# Patient Record
Sex: FEMALE | Race: BLACK OR AFRICAN AMERICAN | Employment: OTHER | URBAN - METROPOLITAN AREA
[De-identification: names, ages, dates, MRNs, and addresses within clinical notes are randomized per-mention and may not be internally consistent; named-entity substitution may affect disease eponyms.]

---

## 2017-02-08 ENCOUNTER — HOSPITAL ENCOUNTER (OUTPATIENT)
Dept: PHYSICAL THERAPY | Age: 62
Discharge: HOME | End: 2017-02-08
Payer: COMMERCIAL

## 2017-02-08 PROCEDURE — 97530 THERAPEUTIC ACTIVITIES: CPT

## 2017-02-08 PROCEDURE — 97110 THERAPEUTIC EXERCISES: CPT

## 2017-02-08 PROCEDURE — 97162 PT EVAL MOD COMPLEX 30 MIN: CPT
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
## 2017-02-09 ENCOUNTER — HOSPITAL ENCOUNTER (OUTPATIENT)
Dept: PHYSICAL THERAPY | Age: 62
Discharge: HOME | End: 2017-02-09
Payer: COMMERCIAL

## 2017-02-09 PROCEDURE — 97110 THERAPEUTIC EXERCISES: CPT

## 2017-02-09 PROCEDURE — 97140 MANUAL THERAPY 1/> REGIONS: CPT

## 2017-02-09 NOTE — PROGRESS NOTES
PHYSICAL THERAPY - DAILY TREATMENT NOTE    Patient Name: Kamilla Steele        Date: 2017  : 1955   YES Patient  Verified  Visit #:   2     Insurance: Payor: Beverley Osuna / Plan: Rolan Umana / Product Type: HMO /      In time: 9:05 Out time: 10:05   Total Treatment Time: 60     Medicare Time Tracking (below)   Total Timed Codes (min):  NA 1:1 Treatment Time:  NA     TREATMENT AREA =  Neck pain [M54.2]  Low back pain [M54.5]    SUBJECTIVE    Pain Level (on 0 to 10 scale):  7  / 10   Medication Changes/New allergies or changes in medical history, any new surgeries or procedures? NO     If yes, update Summary List   Subjective Functional Status/Changes:  []  No changes reported     \"I still am really stiff and in pain whenever I move. \"          OBJECTIVE    Modalities Rationale: To decrease pain and muscle guarding.       min [] Estim, type/location:                                      []  att     []  unatt     []  w/US     []  w/ice    []  w/heat    min []  Mechanical Traction: type/lbs                   []  pro   []  sup   []  int   []  cont    []  before manual    []  after manual    min []  Ultrasound, settings/location:      min []  Iontophoresis w/ dexamethasone, location:                                               []  take home patch       []  in clinic   10 min []  Ice     [x]  Heat    location/position: Cervical spine and L chest wall    min []  Vasopneumatic Device, press/temp:     min []  Other:    [x] Skin assessment post-treatment (if applicable):   [x]  intact    []  redness- no adverse reaction     []redness - adverse reaction:     38 min Therapeutic Exercise:  [x]  See flow sheet   Rationale:      increase ROM and increase strength to improve the patients ability to perform ADL's with improved periscapular stability     12 min Manual Therapy: TPR and MFR to the L pec minor/levator scapulae, subscapularis/quadratus lumborum   Rationale:      decrease pain, increase ROM and increase tissue extensibility to improve patient's ability to transfer with decreased sensitiivty. min Patient Education:  YES  Reviewed HEP   []  Progressed/Changed HEP based on:   Patient reports compliance     Other Objective/Functional Measures:    Pt had a lot of pain with any exercise involving ER in either supine or standing. Pt was able to perform other exercises with temporary discomfort but no apparent relief. Post Treatment Pain Level (on 0 to 10) scale:   6  / 10     ASSESSMENT    Assessment/Changes in Function:     Pt presents to PT with remaining muscle guarding and poor activity tolerance due to persistent muscule guarding. The pt would benefit from continued gait training to prevent lateral/lean and compensations that increase LBP in addition to postural training to decrease cervical/pectoral tightness and pain. []  See Progress Note/Recertification   Patient will continue to benefit from skilled PT services to modify and progress therapeutic interventions, address functional mobility deficits, address ROM deficits, address strength deficits, analyze and address soft tissue restrictions, analyze and cue movement patterns, analyze and modify body mechanics/ergonomics and assess and modify postural abnormalities to attain remaining goals. Progress toward goals / Updated goals: · Short Term Goals: To be accomplished in 2-3 weeks:  1. Patient will demonstrate compliance with HEP for symptom management at home. 2. Patient will report at least 50% improvement in symptoms associated while sitting for 30 minutes to increase tolerance for work duties. 3. Patient will require vc's to sit with upright posture only 25% of the time to increase carryover to ADL's. Addressed with rows/extensions and retractions. · Long Term Goals: To be accomplished in 4-6 weeks:  1. Patient will be independent with HEP to self-manage and prevent symptoms upon DC.   2. Patient will improve FOTO score by 8 points to indicate improved functional status. 3. 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 4. Patient will demonstrate at least 4+ hip abduction MMT B to increase symmetrical weight-bearing in gait.      PLAN    [x]  Upgrade activities as tolerated YES Continue plan of care   []  Discharge due to :    []  Other:      Therapist: Ernst Campuzano    Date: 2/9/2017 Time: 11:03 AM     Future Appointments  Date Time Provider Kateryna Melvin   2/15/2017 4:00 PM [facility]   2/21/2017 8:00 AM [facility]   2/22/2017 4:00 PM [address] [place] [address]

## 2017-02-14 ENCOUNTER — HOSPITAL ENCOUNTER (OUTPATIENT)
Dept: PHYSICAL THERAPY | Age: 62
Discharge: HOME | End: 2017-02-14
Payer: COMMERCIAL

## 2017-02-14 PROCEDURE — 97140 MANUAL THERAPY 1/> REGIONS: CPT

## 2017-02-14 PROCEDURE — 97110 THERAPEUTIC EXERCISES: CPT

## 2017-02-14 NOTE — PROGRESS NOTES
PHYSICAL THERAPY - DAILY TREATMENT NOTE    Patient Name: Kamilla Steele        Date: 2017  : 1955   YES Patient  Verified  Visit #:   3     Insurance: Payor: Beverley Osuna / Plan: Rolan Umana / Product Type: HMO /      In time: 8:05 Out time: 8:53   Total Treatment Time: 48     Medicare Time Tracking (below)   Total Timed Codes (min):  NA 1:1 Treatment Time:  NA     TREATMENT AREA =  Neck pain [M54.2]  Low back pain [M54.5]    SUBJECTIVE    Pain Level (on 0 to 10 scale):   10   Medication Changes/New allergies or changes in medical history, any new surgeries or procedures? NO     If yes, update Summary List   Subjective Functional Status/Changes:  []  No changes reported     \"I feel a good bit better and I haven't taken any medication. \"          OBJECTIVE    40 min Therapeutic Exercise:  [x]  See flow sheet   Rationale:      increase ROM and increase strength to improve the patients ability to perform ADL's with improved core stability. 8 min Manual Therapy: TPR of L QL levator scapulae and scalenes   Rationale:      decrease pain, increase ROM and increase tissue extensibility to improve patient's ability to perform [place] ADL's with improved activity tolerance. min Patient Education:  YES  Reviewed HEP   []  Progressed/Changed HEP based on:   Patient reports compliance     Other Objective/Functional Measures:    Initiated SB core stabilization exercise and supine scapular stabilization exercises without pain this visit. Reviewed correct postural mechanics while sitting at a desk for [employer] duties and sitting to pray in order to decrease strain on the cervical musculature. Post Treatment Pain Level (on 0 to 10) scale:   3  / 10     ASSESSMENT    Assessment/Changes in Function:     Pt is slowly decreasing in pain associated with postural stabilization exercises.   Patient would benefit from continued review of cervical/lumbar stretches and a core stabilization program to prevent recurrence of symptoms. []  See Progress Note/Recertification   Patient will continue to benefit from skilled PT services to modify and progress therapeutic interventions, address functional mobility deficits, address strength deficits, analyze and address soft tissue restrictions, analyze and cue movement patterns, analyze and modify body mechanics/ergonomics and assess and modify postural abnormalities to attain remaining goals. Progress toward goals / Updated goals: · Short Term Goals: To be accomplished in 2-3 weeks:  1. Patient will demonstrate compliance with HEP for symptom management at home. 2. Patient will report at least 50% improvement in symptoms associated while sitting for 30 minutes to increase tolerance for work duties. Progressing. Addressed with review of proper postural mechanics with ADL's.  3. Patient will require vc's to sit with upright posture only 25% of the time to increase carryover to ADL's. Addressed with rows/extensions and retractions. · Long Term Goals: To be accomplished in 4-6 weeks:  1. Patient will be independent with HEP to self-manage and prevent symptoms upon DC. 2. Patient will improve FOTO score by 8 points to indicate improved functional status. 3. 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 4. Patient will demonstrate at least 4+ hip abduction MMT B to increase symmetrical weight-bearing in gait.      PLAN    [x]  Upgrade activities as tolerated YES Continue plan of care   []  Discharge due to :    []  Other:      Therapist: Marine Torres    Date: 2/14/2017 Time: 8:48 AM     Future Appointments  Date Time Provider Kateryna Melvin   2/16/2017 8:30 AM [address] [place] [address]   2/21/2017 8:00 AM [address] [place] [address]

## 2017-02-15 ENCOUNTER — APPOINTMENT (OUTPATIENT)
Dept: PHYSICAL THERAPY | Age: 62
End: 2017-02-15
Payer: COMMERCIAL

## 2017-02-16 ENCOUNTER — HOSPITAL ENCOUNTER (OUTPATIENT)
Dept: PHYSICAL THERAPY | Age: 62
Discharge: HOME | End: 2017-02-16
Payer: COMMERCIAL

## 2017-02-16 PROCEDURE — 97110 THERAPEUTIC EXERCISES: CPT

## 2017-02-16 NOTE — PROGRESS NOTES
PHYSICAL THERAPY - DAILY TREATMENT NOTE    Patient Name: Broderick Angel        Date: 2017  : 1955   YES Patient  Verified  Visit #:   5     Insurance: Payor: Jose Claudio / Plan: Lori Ache / Product Type: HMO /      In time: 8:35 Out time: 9:15   Total Treatment Time: 40     Medicare Time Tracking (below)   Total Timed Codes (min):  NA 1:1 Treatment Time:  NA     TREATMENT AREA =  Neck pain [M54.2]  Low back pain [M54.5]    SUBJECTIVE    Pain Level (on 0 to 10 scale):  3  / 10   Medication Changes/New allergies or changes in medical history, any new surgeries or procedures? NO     If yes, update Summary List   Subjective Functional Status/Changes:  []  No changes reported     \"I'm slowly getting better. I want you to call my doctor though so I know what I should do for my knee. \"       OBJECTIVE    40 min Therapeutic Exercise:  [x]  See flow sheet   Rationale:      increase ROM and increase strength to improve the patients ability to perform ADL's with improved periscapular stability. min Patient Education:  YES  Reviewed HEP   []  Progressed/Changed HEP based on:   Patient reports compliance     Other Objective/Functional Measures:    Pt still has pain with resisted IR; however, symptoms are gradually improving over time. Pt has extreme difficulty with prone hip extension, but can produce with vc's. Post Treatment Pain Level (on 0 to 10) scale:   2  / 10     ASSESSMENT    Assessment/Changes in Function:     Pt presents to PT with gradually imrpoving symptoms and tolerance for exericse.  Pt would benefit from a continued focus on postural and core stability with ADL's.     []  See Progress Note/Recertification   Patient will continue to benefit from skilled PT services to modify and progress therapeutic interventions, address functional mobility deficits, address strength deficits, analyze and address soft tissue restrictions, analyze and cue movement patterns, analyze and modify body mechanics/ergonomics and assess and modify postural abnormalities to attain remaining goals. Progress toward goals / Updated goals: · Short Term Goals: To be accomplished in 2-3 weeks:  1. Patient will demonstrate compliance with HEP for symptom management at home. 2. Patient will report at least 50% improvement in symptoms associated while sitting for 30 minutes to increase tolerance for work duties. Progressing. Addressed with review of proper postural mechanics with ADL's.  3. Patient will require vc's to sit with upright posture only 25% of the time to increase carryover to ADL's. Addressed with rows/extensions and retractions. · Long Term Goals: To be accomplished in 4-6 weeks:  1. Patient will be independent with HEP to self-manage and prevent symptoms upon DC. 2. Patient will improve FOTO score by 8 points to indicate improved functional status. 3. 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 Addressed with prone hip extension. 4. Patient will demonstrate at least 4+ hip abduction MMT B to increase symmetrical weight-bearing in gait.      PLAN    [x]  Upgrade activities as tolerated YES Continue plan of care   []  Discharge due to :    []  Other:      Therapist: Hill Kahn    Date: 2/16/2017 Time: 10:17 AM     Future Appointments  Date Time Provider Kateryna Melvin   2/21/2017 8:00 AM [address] [place] [address] [address]   2/23/2017 2:00 PM [facility] [address]   3/2/2017 8:00 AM [place] [address] [address]   3/7/2017 8:00 AM [place] [address] [address]   3/9/2017 8:00 AM Dasha [place] [address] [place] [address] [address]   3/15/2017 5:30 PM [address] [place] [address] [address]   3/17/2017 2:00 PM [place] [address] [address]   3/22/2017 5:30 PM [place] [address] [address]   3/24/2017 2:00 PM [place] [address]   3/29/2017 5:30 PM [place] [address]   3/31/2017 2:00  [address]

## 2017-02-21 ENCOUNTER — APPOINTMENT (OUTPATIENT)
Dept: PHYSICAL THERAPY | Age: 62
End: 2017-02-21
Payer: COMMERCIAL

## 2017-02-22 ENCOUNTER — APPOINTMENT (OUTPATIENT)
Dept: PHYSICAL THERAPY | Age: 62
End: 2017-02-22
Payer: COMMERCIAL

## 2017-03-02 ENCOUNTER — HOSPITAL ENCOUNTER (OUTPATIENT)
Dept: PHYSICAL THERAPY | Age: 62
End: 2017-03-02

## 2017-03-09 ENCOUNTER — APPOINTMENT (OUTPATIENT)
Dept: PHYSICAL THERAPY | Age: 62
End: 2017-03-09

## 2017-03-15 ENCOUNTER — APPOINTMENT (OUTPATIENT)
Dept: PHYSICAL THERAPY | Age: 62
End: 2017-03-15

## 2017-03-17 ENCOUNTER — APPOINTMENT (OUTPATIENT)
Dept: PHYSICAL THERAPY | Age: 62
End: 2017-03-17

## 2017-03-22 ENCOUNTER — APPOINTMENT (OUTPATIENT)
Dept: PHYSICAL THERAPY | Age: 62
End: 2017-03-22

## 2017-03-24 ENCOUNTER — APPOINTMENT (OUTPATIENT)
Dept: PHYSICAL THERAPY | Age: 62
End: 2017-03-24

## 2017-03-29 ENCOUNTER — APPOINTMENT (OUTPATIENT)
Dept: PHYSICAL THERAPY | Age: 62
End: 2017-03-29

## 2017-03-31 ENCOUNTER — APPOINTMENT (OUTPATIENT)
Dept: PHYSICAL THERAPY | Age: 62
End: 2017-03-31

## 2017-04-13 NOTE — PROGRESS NOTES
[facility] PHYSICAL THERAPY  [address] [place] [address] [address], [place], [address] - Phone: [phone]  Fax: [phone]      Dear Dr. Coreen Medina MD,   Under your direction, we have been providing physical therapy for your patient Shamika Coreas, for a diagnosis of Neck pain [M54.2]  Low back pain [M54.5]. The patient was scheduled for several visits after her initial evaluation. She attended 4 of her follow up sessions, and was last seen on 2/16/17. She communicated with us she has spoken with her physician and has decided she no longer needs PT. Due to the inability to further her care from non-compliance to our attendance policy and POC, we are discharging the patient from physical therapy at this time. .     We appreciate the kind referral and would willingly work with this patient again, should she be able to arrange regular attendance and is appropriate for further treatment. Your patient's health is our primary concern. If you have any questions/comments please contact us directly at [phone]. Thank you for allowing us to assist in the care of your patient. Therapist Signature: Jimena Seymour DPALLAN Date: 4/13/2017   Reporting Period 2/8/17 - 2/22/17 Time: 1:48 PM   NOTE TO PHYSICIAN:  PLEASE COMPLETE THE ORDERS BELOW AND FAX TO   [facility]: [phone]  If you are unable to process this request in 24 hours please contact our office: [phone]    ___ I have read the above report and request that my patient continue as recommended.   ___ I have read the above report and request that my patient continue therapy with the following changes/special instructions:_________________________________________________________   ___ I have read the above report and request that my patient be discharged from therapy.      Physician Signature: Jimena Seymour DPT   Date:       Time:

## 2017-12-04 ENCOUNTER — HOSPITAL ENCOUNTER (EMERGENCY)
Age: 62
Discharge: HOME | End: 2017-12-04
Attending: EMERGENCY MEDICINE | Admitting: EMERGENCY MEDICINE
Payer: SELF-PAY

## 2017-12-04 ENCOUNTER — APPOINTMENT (OUTPATIENT)
Dept: CT IMAGING | Age: 62
End: 2017-12-04
Attending: NURSE PRACTITIONER
Payer: SELF-PAY

## 2017-12-04 VITALS
HEART RATE: 92 BPM | RESPIRATION RATE: 18 BRPM | SYSTOLIC BLOOD PRESSURE: 148 MMHG | TEMPERATURE: 98.2 F | OXYGEN SATURATION: 94 % | DIASTOLIC BLOOD PRESSURE: 65 MMHG

## 2017-12-04 DIAGNOSIS — R22.1 LOCALIZED SWELLING, MASS AND LUMP, NECK: ICD-10-CM

## 2017-12-04 DIAGNOSIS — J02.9 PHARYNGITIS, UNSPECIFIED ETIOLOGY: Primary | ICD-10-CM

## 2017-12-04 LAB
ANION GAP BLD CALC-SCNC: 15 MMOL/L (ref 10–20)
BASOPHILS # BLD: 0 K/UL (ref 0–0.06)
BASOPHILS NFR BLD: 0 % (ref 0–2)
BUN BLD-MCNC: 14 MG/DL (ref 7–18)
CA-I BLD-MCNC: 1.3 MMOL/L (ref 1.12–1.32)
CHLORIDE BLD-SCNC: 99 MMOL/L (ref 100–108)
CO2 BLD-SCNC: 28 MMOL/L (ref 19–24)
CREAT UR-MCNC: 0.6 MG/DL (ref 0.6–1.3)
DIFFERENTIAL METHOD BLD: ABNORMAL
EOSINOPHIL # BLD: 0.1 K/UL (ref 0–0.4)
EOSINOPHIL NFR BLD: 0 % (ref 0–5)
ERYTHROCYTE [DISTWIDTH] IN BLOOD BY AUTOMATED COUNT: 13.1 % (ref 11.6–14.5)
GLUCOSE BLD STRIP.AUTO-MCNC: 301 MG/DL (ref 74–106)
HCT VFR BLD AUTO: 36 % (ref 35–45)
HCT VFR BLD CALC: 38 % (ref 36–49)
HGB BLD-MCNC: 12.2 G/DL (ref 12–16)
HGB BLD-MCNC: 12.9 G/DL (ref 12–16)
LYMPHOCYTES # BLD: 1 K/UL (ref 0.9–3.6)
LYMPHOCYTES NFR BLD: 6 % (ref 21–52)
MCH RBC QN AUTO: 28.5 PG (ref 24–34)
MCHC RBC AUTO-ENTMCNC: 33.9 G/DL (ref 31–37)
MCV RBC AUTO: 84.1 FL (ref 74–97)
MONOCYTES # BLD: 0.9 K/UL (ref 0.05–1.2)
MONOCYTES NFR BLD: 6 % (ref 3–10)
NEUTS SEG # BLD: 13.4 K/UL (ref 1.8–8)
NEUTS SEG NFR BLD: 88 % (ref 40–73)
PLATELET # BLD AUTO: 312 K/UL (ref 135–420)
PMV BLD AUTO: 10.7 FL (ref 9.2–11.8)
POTASSIUM BLD-SCNC: 3.8 MMOL/L (ref 3.5–5.5)
RBC # BLD AUTO: 4.28 M/UL (ref 4.2–5.3)
SODIUM BLD-SCNC: 137 MMOL/L (ref 136–145)
WBC # BLD AUTO: 15.4 K/UL (ref 4.6–13.2)

## 2017-12-04 PROCEDURE — 74011636320 HC RX REV CODE- 636/320: Performed by: EMERGENCY MEDICINE

## 2017-12-04 PROCEDURE — 74011250637 HC RX REV CODE- 250/637: Performed by: NURSE PRACTITIONER

## 2017-12-04 PROCEDURE — 99284 EMERGENCY DEPT VISIT MOD MDM: CPT

## 2017-12-04 PROCEDURE — 85025 COMPLETE CBC W/AUTO DIFF WBC: CPT | Performed by: NURSE PRACTITIONER

## 2017-12-04 PROCEDURE — 70491 CT SOFT TISSUE NECK W/DYE: CPT

## 2017-12-04 PROCEDURE — 80047 BASIC METABLC PNL IONIZED CA: CPT

## 2017-12-04 RX ORDER — DEXAMETHASONE SODIUM PHOSPHATE 4 MG/ML
10 INJECTION, SOLUTION INTRA-ARTICULAR; INTRALESIONAL; INTRAMUSCULAR; INTRAVENOUS; SOFT TISSUE
Status: COMPLETED | OUTPATIENT
Start: 2017-12-04 | End: 2017-12-04

## 2017-12-04 RX ORDER — CLINDAMYCIN HYDROCHLORIDE 150 MG/1
300 CAPSULE ORAL
Status: COMPLETED | OUTPATIENT
Start: 2017-12-04 | End: 2017-12-04

## 2017-12-04 RX ORDER — CLINDAMYCIN HYDROCHLORIDE 300 MG/1
300 CAPSULE ORAL 3 TIMES DAILY
Qty: 30 CAP | Refills: 0 | Status: SHIPPED | OUTPATIENT
Start: 2017-12-04 | End: 2017-12-14

## 2017-12-04 RX ADMIN — DEXAMETHASONE SODIUM PHOSPHATE 10 MG: 4 INJECTION, SOLUTION INTRAMUSCULAR; INTRAVENOUS at 07:33

## 2017-12-04 RX ADMIN — IOPAMIDOL 80 ML: 612 INJECTION, SOLUTION INTRAVENOUS at 09:26

## 2017-12-04 RX ADMIN — CLINDAMYCIN HYDROCHLORIDE 300 MG: 150 CAPSULE ORAL at 11:17

## 2017-12-04 NOTE — ED NOTES
Pt being discharged home. Discharge instructions given, and pt expresses understanding. Discontinued IV and helped patient to gather belongings. Pt discharged with family member. Prescription given for clindamycin.

## 2017-12-04 NOTE — ED PROVIDER NOTES
HPI Comments: 6:46 AM   57 y.o. female presents to ED C/O sore throat, neck swelling. Patient has a HX of diabetes, tonsillectomy. Patient reports mild sore throat x 3-4 days, but pain got worse yesterday, associated with left neck swelling. Patient reports pain with swallowing but denies difficulty swallowing. Patient reports mild bilateral ear pain, denies dizziness, CP, SOB, fever. Patient is a nonsmoker. Patient reports her blood sugars have been running in the 300s, this is her \"normal\". Patient denies any other symptoms or complaints. The history is provided by the patient. History limited by: No language barrier. Past Medical History:   Diagnosis Date    Diabetes mellitus ([facility] [address]        Past Surgical History:   Procedure Laterality Date    HX TONSILLECTOMY           History reviewed. No pertinent family history. Social History     Social History    Marital status:      Spouse name: N/A    Number of children: N/A    Years of education: N/A     Occupational History    Not on file. Social History Main Topics    Smoking status: Never Smoker    Smokeless tobacco: Never Used    Alcohol use No    Drug use: No    Sexual activity: Not on file     Other Topics Concern    Not on file     Social History Narrative    No narrative on file         ALLERGIES: Review of patient's allergies indicates no known allergies. Review of Systems   Constitutional: Negative for appetite change and fever. HENT: Positive for congestion and sore throat. Negative for rhinorrhea, trouble swallowing and voice change. Eyes: Negative for visual disturbance. Respiratory: Negative for cough, shortness of breath and wheezing. Cardiovascular: Negative for chest pain and leg swelling. Gastrointestinal: Negative for abdominal pain, constipation, diarrhea, nausea and vomiting. Endocrine: Negative for polyuria. Genitourinary: Negative for dysuria.    Musculoskeletal: Positive for neck pain. Negative for arthralgias and back pain. Left neck pain swelling and pain   Skin: Negative for wound. Allergic/Immunologic: Negative for immunocompromised state. Neurological: Negative for dizziness, syncope and headaches. Psychiatric/Behavioral: Negative for suicidal ideas. Vitals:    12/04/17 0715 12/04/17 0730 12/04/17 1028 12/04/17 1030   BP: 162/74 172/72 147/60 148/65   Pulse:   92    Resp:   18    Temp:       SpO2: 95% 96% 94% 94%            Physical Exam   Constitutional: She is oriented to person, place, and time. She appears well-developed and well-nourished. No distress. Speaking in complete sentences   HENT:   Right Ear: Hearing, tympanic membrane, external ear and ear canal normal.   Left Ear: Tympanic membrane is erythematous. Tympanic membrane is not retracted and not bulging. Nose: Nose normal.   Mouth/Throat: No uvula swelling. Posterior oropharyngeal edema and posterior oropharyngeal erythema present. Eyes:   Mild erythema and swelling to left superior eyelid. Neck:       No crepitus of left side, no subcutaneous air palpated. Cardiovascular: Normal rate and regular rhythm. Pulmonary/Chest: Effort normal and breath sounds normal. No respiratory distress. She has no wheezes. She has no rales. Musculoskeletal: Normal range of motion. Neurological: She is alert and oriented to person, place, and time. She exhibits normal muscle tone. Coordination normal.   Skin: Skin is warm and dry. No rash noted. She is not diaphoretic. No erythema. No pallor. Nursing note and vitals reviewed. MDM  Number of Diagnoses or Management Options  Localized swelling, mass and lump, neck:   Pharyngitis, unspecified etiology:   Diagnosis management comments: DDX - viral pharyngitis, strep pharyngitis, PTA, lymphadenopathy, neck mass.       MDM:  7:13 AM Plan - discussed case with Dr Tammy Joseph, who has also evaluated patient, agrees with plan for CT soft tissue neck with contrast.  Patient is aware of plan  7:55 AM progress - glucose is elevated at 301, patient reports this is her normal glucose, WBC is elevated at 15.4, abs neurtrophils also elevated at 13.4  8:47 AM multiple attempts at PIV by nursing staff, without success. Dr Thomas Feliciano will evaluate patient with ultrasound for PIV for CT scan with contrast.   10:56 AM CT neck  - IMPRESSION:     Extensive asymmetric left-sided inflammatory changes within the mid to lower  neck with involvement of the oropharynx and hypopharynx and extension into the  adjacent cervical soft tissues including retropharyngeal fluid, left  submandibular and carotid spaces, and involvement of the adjacent left lower  neck muscles. These findings are nonspecific perhaps extensive pharyngitis; is  patient immunocompromised? No discrete abscess is identified. Findings do not  appear centered along the left mandibular gland to suggest sialoadenitis origin. No definite destructive changes seen in the spine to suggest discitis origin. No  underlying masslike lesion seen. Clinical correlation is recommended. Suggest  interval CT follow-up given the extensive inflammatory changes. 10:56 AM Discussed case with attending, due to patient tolerating PO, no evidence of respiratory distress, airway compromise, patient may be discharged home with PO antibiotics. Patient given copy of results. Extensive discussion with patient regarding return to the ED for airway compromise, SOB, tongue or throat swelling, or inability to take oral antibiotics. Patient also educated to return in 2-3 days if swelling resolved. Questions denied.         Amount and/or Complexity of Data Reviewed  Clinical lab tests: ordered and reviewed  Tests in the radiology section of CPT®: ordered and reviewed      ED Course       Procedures             RESULTS:    CT NECK SOFT TISSUE W CONT   Final Result          Labs Reviewed   CBC WITH AUTOMATED DIFF - Abnormal; Notable for the following: Result Value    WBC 15.4 (*)     NEUTROPHILS 88 (*)     LYMPHOCYTES 6 (*)     ABS. NEUTROPHILS 13.4 (*)     All other components within normal limits   POC CHEM8 - Abnormal; Notable for the following:     CO2, POC 28 (*)     Glucose,  (*)     Chloride, POC 99 (*)     All other components within normal limits   POC CHEM8       Recent Results (from the past 12 hour(s))   CBC WITH AUTOMATED DIFF    Collection Time: 12/04/17  7:25 AM   Result Value Ref Range    WBC 15.4 (H) 4.6 - 13.2 K/uL    RBC 4.28 4.20 - 5.30 M/uL    HGB 12.2 12.0 - 16.0 g/dL    HCT 36.0 35.0 - 45.0 %    MCV 84.1 74.0 - 97.0 FL    MCH 28.5 24.0 - 34.0 PG    MCHC 33.9 31.0 - 37.0 g/dL    RDW 13.1 11.6 - 14.5 %    PLATELET 101 765 - 453 K/uL    MPV 10.7 9.2 - 11.8 FL    NEUTROPHILS 88 (H) 40 - 73 %    LYMPHOCYTES 6 (L) 21 - 52 %    MONOCYTES 6 3 - 10 %    EOSINOPHILS 0 0 - 5 %    BASOPHILS 0 0 - 2 %    ABS. NEUTROPHILS 13.4 (H) 1.8 - 8.0 K/UL    ABS. LYMPHOCYTES 1.0 0.9 - 3.6 K/UL    ABS. MONOCYTES 0.9 0.05 - 1.2 K/UL    ABS. EOSINOPHILS 0.1 0.0 - 0.4 K/UL    ABS. BASOPHILS 0.0 0.0 - 0.06 K/UL    DF AUTOMATED     POC CHEM8    Collection Time: 12/04/17  7:29 AM   Result Value Ref Range    CO2, POC 28 (H) 19 - 24 MMOL/L    Glucose,  (H) 74 - 106 MG/DL    BUN, POC 14 7 - 18 MG/DL    Creatinine, POC 0.6 0.6 - 1.3 MG/DL    GFRAA, POC >60 >60 ml/min/1.73m2    GFRNA, POC >60 >60 ml/min/1.73m2    Sodium,  136 - 145 MMOL/L    Potassium, POC 3.8 3.5 - 5.5 MMOL/L    Calcium, ionized (POC) 1.30 1.12 - 1.32 MMOL/L    Chloride, POC 99 (L) 100 - 108 MMOL/L    Anion gap, POC 15 10 - 20      Hematocrit, POC 38 36 - 49 %    Hemoglobin, POC 12.9 12 - 16 G/DL       PROGRESS NOTE:   6:47 AM   Initial assessment completed. Written by Omar PRESLEY    DISCHARGE NOTE:  10:58 AM   Quin Wallace  results have been reviewed with her. She has been counseled regarding her diagnosis, treatment, and plan.   She verbally conveys understanding and agreement of the signs, symptoms, diagnosis, treatment and prognosis and additionally agrees to follow up as discussed. She also agrees with the care-plan and conveys that all of her questions have been answered. I have also provided discharge instructions for her that include: educational information regarding their diagnosis and treatment, and list of reasons why they would want to return to the ED prior to their follow-up appointment, should her condition change. CLINICAL IMPRESSION:    1. Pharyngitis, unspecified etiology    2. Localized swelling, mass and lump, neck        AFTER VISIT PLAN:    Current Discharge Medication List      START taking these medications    Details   clindamycin (CLEOCIN) 300 mg capsule Take 1 Cap by mouth three (3) times daily for 10 days. Qty: 30 Cap, Refills: 0              Follow-up Information     Follow up With Details Comments Ousmane Man MD Schedule an appointment as soon as possible for a visit in 3 days Further evaluation [address] EMERGENCY DEPT  As needed - if neck swelling not resolved in 3 days, or for worsening symptoms.   [address]  [phone]           Written by Duran ELLISC

## 2017-12-04 NOTE — DISCHARGE INSTRUCTIONS
Sore Throat: Care Instructions  Your Care Instructions    Infection by bacteria or a virus causes most sore throats. Cigarette smoke, dry air, air pollution, allergies, and yelling can also cause a sore throat. Sore throats can be painful and annoying. Fortunately, most sore throats go away on their own. If you have a bacterial infection, your doctor may prescribe antibiotics. Follow-up care is a key part of your treatment and safety. Be sure to make and go to all appointments, and call your doctor if you are having problems. It's also a good idea to know your test results and keep a list of the medicines you take. How can you care for yourself at home? · If your doctor prescribed antibiotics, take them as directed. Do not stop taking them just because you feel better. You need to take the full course of antibiotics. · Gargle with warm salt water once an hour to help reduce swelling and relieve discomfort. Use 1 teaspoon of salt mixed in 1 cup of warm water. · Take an over-the-counter pain medicine, such as acetaminophen (Tylenol), ibuprofen (Advil, Motrin), or naproxen (Aleve). Read and follow all instructions on the label. · Be careful when taking over-the-counter cold or flu medicines and Tylenol at the same time. Many of these medicines have acetaminophen, which is Tylenol. Read the labels to make sure that you are not taking more than the recommended dose. Too much acetaminophen (Tylenol) can be harmful. · Drink plenty of fluids. Fluids may help soothe an irritated throat. Hot fluids, such as tea or soup, may help decrease throat pain. · Use over-the-counter throat lozenges to soothe pain. Regular cough drops or hard candy may also help. These should not be given to young children because of the risk of choking. · Do not smoke or allow others to smoke around you. If you need help quitting, talk to your doctor about stop-smoking programs and medicines.  These can increase your chances of quitting for good. · Use a vaporizer or humidifier to add moisture to your bedroom. Follow the directions for cleaning the machine. When should you call for help? Call your doctor now or seek immediate medical care if:  ? · You have new or worse trouble swallowing. ? · Your sore throat gets much worse on one side. ? Watch closely for changes in your health, and be sure to contact your doctor if you do not get better as expected. Where can you learn more? Go to http://tarik-maddy.info/. Enter [phone] in the search box to learn more about \"Sore Throat: Care Instructions. \"  Current as of: May 12, 2017  Content Version: 11.4  © 6336-5807 [email or web address]. Care instructions adapted under license by [employer] (which disclaims liability or warranty for this information). If you have questions about a medical condition or this instruction, always ask your healthcare professional. [address] any warranty or liability for your use of this information. [employer] Activation    Thank you for requesting access to [employer]. Please follow the instructions below to securely access and download your online medical record. [employer] allows you to send messages to your doctor, view your test results, renew your prescriptions, schedule appointments, and more. How Do I Sign Up? 1. In your internet browser, go to [email or web address]  2. Click on the First Time User? Click Here link in the Sign In box. You will be redirect to the New Member Sign Up page. 3. Enter your [employer] Access Code exactly as it appears below. You will not need to use this code after youve completed the sign-up process. If you do not sign up before the expiration date, you must request a new code. [employer] Access Code: [email or web address]  Expires: 3/4/2018 10:55 AM (This is the date your [employer] access code will )    4.  Enter the last four digits of your Social Security Number (xxxx) and Date of Birth (mm/dd/yyyy) as indicated and click Submit. You will be taken to the next sign-up page. 5. Create a [employer] ID. This will be your [employer] login ID and cannot be changed, so think of one that is secure and easy to remember. 6. Create a [employer] password. You can change your password at any time. 7. Enter your Password Reset Question and Answer. This can be used at a later time if you forget your password. 8. Enter your e-mail address. You will receive e-mail notification when new information is available in [address] 9. Click Sign Up. You can now view and download portions of your medical record. 10. Click the Download Summary menu link to download a portable copy of your medical information. Additional Information    If you have questions, please visit the Frequently Asked Questions section of the [employer] website at [email or web address] Remember, [employer] is NOT to be used for urgent needs. For medical emergencies, dial 911.

## 2018-06-18 ENCOUNTER — HOSPITAL ENCOUNTER (OUTPATIENT)
Dept: ULTRASOUND IMAGING | Age: 63
Discharge: HOME | End: 2018-06-18
Attending: INTERNAL MEDICINE
Payer: MEDICARE

## 2018-06-18 DIAGNOSIS — R80.9 PROTEINURIA: ICD-10-CM

## 2018-06-18 PROCEDURE — 76770 US EXAM ABDO BACK WALL COMP: CPT

## 2018-06-19 ENCOUNTER — HOSPITAL ENCOUNTER (OUTPATIENT)
Dept: CT IMAGING | Age: 63
Discharge: HOME | End: 2018-06-19
Attending: INTERNAL MEDICINE
Payer: MEDICARE

## 2018-06-19 DIAGNOSIS — D47.2 MONOCLONAL PARAPROTEINEMIA: ICD-10-CM

## 2018-06-19 DIAGNOSIS — C85.90 NON-HODGKIN'S LYMPHOMA (HCC): ICD-10-CM

## 2018-06-19 LAB — CREAT UR-MCNC: 0.6 MG/DL (ref 0.6–1.3)

## 2018-06-19 PROCEDURE — 71260 CT THORAX DX C+: CPT

## 2018-06-19 PROCEDURE — 82565 ASSAY OF CREATININE: CPT

## 2018-06-19 PROCEDURE — 74011636320 HC RX REV CODE- 636/320: Performed by: INTERNAL MEDICINE

## 2018-06-19 PROCEDURE — 74011000255 HC RX REV CODE- 255: Performed by: INTERNAL MEDICINE

## 2018-06-19 RX ORDER — BARIUM SULFATE 20 MG/ML
900 SUSPENSION ORAL
Status: COMPLETED | OUTPATIENT
Start: 2018-06-19 | End: 2018-06-19

## 2018-06-19 RX ADMIN — IOPAMIDOL 94 ML: 612 INJECTION, SOLUTION INTRAVENOUS at 07:16

## 2018-06-19 RX ADMIN — BARIUM SULFATE 900 ML: 20 SUSPENSION ORAL at 07:26

## 2018-07-02 RX ORDER — SODIUM CHLORIDE 9 MG/ML
20 INJECTION, SOLUTION INTRAVENOUS CONTINUOUS
Status: CANCELLED | OUTPATIENT
Start: 2018-07-02

## 2018-07-09 ENCOUNTER — APPOINTMENT (OUTPATIENT)
Dept: GENERAL RADIOLOGY | Age: 63
End: 2018-07-09
Attending: RADIOLOGY
Payer: MEDICARE

## 2018-07-09 ENCOUNTER — HOSPITAL ENCOUNTER (OUTPATIENT)
Dept: CT IMAGING | Age: 63
Discharge: HOME | End: 2018-07-09
Attending: INTERNAL MEDICINE | Admitting: RADIOLOGY
Payer: MEDICARE

## 2018-07-09 VITALS
HEART RATE: 76 BPM | OXYGEN SATURATION: 98 % | HEIGHT: 67 IN | DIASTOLIC BLOOD PRESSURE: 63 MMHG | RESPIRATION RATE: 16 BRPM | TEMPERATURE: 97.8 F | SYSTOLIC BLOOD PRESSURE: 157 MMHG

## 2018-07-09 DIAGNOSIS — R05.9 COUGH: ICD-10-CM

## 2018-07-09 DIAGNOSIS — R91.8 LUNG MASS: ICD-10-CM

## 2018-07-09 LAB
ANION GAP SERPL CALC-SCNC: 6 MMOL/L (ref 3–18)
APTT PPP: 81.6 SEC (ref 23–36.4)
BUN SERPL-MCNC: 12 MG/DL (ref 7–18)
BUN/CREAT SERPL: 20 (ref 12–20)
CALCIUM SERPL-MCNC: 8.5 MG/DL (ref 8.5–10.1)
CHLORIDE SERPL-SCNC: 105 MMOL/L (ref 100–108)
CO2 SERPL-SCNC: 29 MMOL/L (ref 21–32)
CREAT SERPL-MCNC: 0.59 MG/DL (ref 0.6–1.3)
ERYTHROCYTE [DISTWIDTH] IN BLOOD BY AUTOMATED COUNT: 13.1 % (ref 11.6–14.5)
GLUCOSE BLD STRIP.AUTO-MCNC: 193 MG/DL (ref 70–110)
GLUCOSE BLD STRIP.AUTO-MCNC: 243 MG/DL (ref 70–110)
GLUCOSE SERPL-MCNC: 260 MG/DL (ref 74–99)
HCT VFR BLD AUTO: 31.7 % (ref 35–45)
HGB BLD-MCNC: 10.5 G/DL (ref 12–16)
INR PPP: 1 (ref 0.8–1.2)
MCH RBC QN AUTO: 27.1 PG (ref 24–34)
MCHC RBC AUTO-ENTMCNC: 33.1 G/DL (ref 31–37)
MCV RBC AUTO: 81.9 FL (ref 74–97)
PLATELET # BLD AUTO: 343 K/UL (ref 135–420)
PMV BLD AUTO: 10.1 FL (ref 9.2–11.8)
POTASSIUM SERPL-SCNC: 4 MMOL/L (ref 3.5–5.5)
PROTHROMBIN TIME: 12.5 SEC (ref 11.5–15.2)
RBC # BLD AUTO: 3.87 M/UL (ref 4.2–5.3)
SODIUM SERPL-SCNC: 140 MMOL/L (ref 136–145)
WBC # BLD AUTO: 7.6 K/UL (ref 4.6–13.2)

## 2018-07-09 PROCEDURE — 88333 PATH CONSLTJ SURG CYTO XM 1: CPT | Performed by: RADIOLOGY

## 2018-07-09 PROCEDURE — 85027 COMPLETE CBC AUTOMATED: CPT | Performed by: RADIOLOGY

## 2018-07-09 PROCEDURE — 88305 TISSUE EXAM BY PATHOLOGIST: CPT | Performed by: RADIOLOGY

## 2018-07-09 PROCEDURE — 82962 GLUCOSE BLOOD TEST: CPT

## 2018-07-09 PROCEDURE — 88342 IMHCHEM/IMCYTCHM 1ST ANTB: CPT | Performed by: RADIOLOGY

## 2018-07-09 PROCEDURE — 71046 X-RAY EXAM CHEST 2 VIEWS: CPT

## 2018-07-09 PROCEDURE — 85730 THROMBOPLASTIN TIME PARTIAL: CPT | Performed by: RADIOLOGY

## 2018-07-09 PROCEDURE — 88341 IMHCHEM/IMCYTCHM EA ADD ANTB: CPT | Performed by: RADIOLOGY

## 2018-07-09 PROCEDURE — 77012 CT SCAN FOR NEEDLE BIOPSY: CPT

## 2018-07-09 PROCEDURE — 88360 TUMOR IMMUNOHISTOCHEM/MANUAL: CPT | Performed by: RADIOLOGY

## 2018-07-09 PROCEDURE — 85610 PROTHROMBIN TIME: CPT | Performed by: RADIOLOGY

## 2018-07-09 PROCEDURE — 80048 BASIC METABOLIC PNL TOTAL CA: CPT | Performed by: RADIOLOGY

## 2018-07-09 PROCEDURE — 74011250636 HC RX REV CODE- 250/636: Performed by: RADIOLOGY

## 2018-07-09 PROCEDURE — 74011000250 HC RX REV CODE- 250: Performed by: RADIOLOGY

## 2018-07-09 RX ORDER — HYDROCODONE BITARTRATE AND ACETAMINOPHEN 5; 325 MG/1; MG/1
1 TABLET ORAL
Status: DISCONTINUED | OUTPATIENT
Start: 2018-07-09 | End: 2018-07-09 | Stop reason: HOSPADM

## 2018-07-09 RX ORDER — AMLODIPINE BESYLATE 5 MG/1
5 TABLET ORAL DAILY
COMMUNITY
End: 2019-11-29

## 2018-07-09 RX ORDER — MIDAZOLAM HYDROCHLORIDE 1 MG/ML
.5-4 INJECTION, SOLUTION INTRAMUSCULAR; INTRAVENOUS
Status: DISCONTINUED | OUTPATIENT
Start: 2018-07-09 | End: 2018-07-09 | Stop reason: HOSPADM

## 2018-07-09 RX ORDER — LIDOCAINE HYDROCHLORIDE 10 MG/ML
1-20 INJECTION INFILTRATION; PERINEURAL
Status: DISCONTINUED | OUTPATIENT
Start: 2018-07-09 | End: 2018-07-09 | Stop reason: HOSPADM

## 2018-07-09 RX ORDER — SODIUM CHLORIDE 9 MG/ML
20 INJECTION, SOLUTION INTRAVENOUS CONTINUOUS
Status: DISCONTINUED | OUTPATIENT
Start: 2018-07-09 | End: 2018-07-09 | Stop reason: HOSPADM

## 2018-07-09 RX ORDER — ERGOCALCIFEROL 1.25 MG/1
50000 CAPSULE ORAL
COMMUNITY
End: 2018-12-30

## 2018-07-09 RX ORDER — FENTANYL CITRATE 50 UG/ML
25-200 INJECTION, SOLUTION INTRAMUSCULAR; INTRAVENOUS
Status: DISCONTINUED | OUTPATIENT
Start: 2018-07-09 | End: 2018-07-09 | Stop reason: HOSPADM

## 2018-07-09 RX ORDER — LIDOCAINE HYDROCHLORIDE 10 MG/ML
10 INJECTION, SOLUTION EPIDURAL; INFILTRATION; INTRACAUDAL; PERINEURAL ONCE
Status: COMPLETED | OUTPATIENT
Start: 2018-07-09 | End: 2018-07-09

## 2018-07-09 RX ADMIN — SODIUM BICARBONATE 1 ML: 0.2 INJECTION, SOLUTION INTRAVENOUS at 10:16

## 2018-07-09 RX ADMIN — MIDAZOLAM HYDROCHLORIDE 0.5 MG: 1 INJECTION, SOLUTION INTRAMUSCULAR; INTRAVENOUS at 10:24

## 2018-07-09 RX ADMIN — FENTANYL CITRATE 25 MCG: 50 INJECTION, SOLUTION INTRAMUSCULAR; INTRAVENOUS at 10:24

## 2018-07-09 RX ADMIN — LIDOCAINE HYDROCHLORIDE 10 ML: 10 INJECTION, SOLUTION EPIDURAL; INFILTRATION; INTRACAUDAL; PERINEURAL at 10:16

## 2018-07-09 RX ADMIN — MIDAZOLAM HYDROCHLORIDE 1 MG: 1 INJECTION, SOLUTION INTRAMUSCULAR; INTRAVENOUS at 10:03

## 2018-07-09 RX ADMIN — FENTANYL CITRATE 50 MCG: 50 INJECTION, SOLUTION INTRAMUSCULAR; INTRAVENOUS at 10:03

## 2018-07-09 RX ADMIN — SODIUM CHLORIDE 20 ML/HR: 900 INJECTION, SOLUTION INTRAVENOUS at 08:36

## 2018-07-09 RX ADMIN — FENTANYL CITRATE 25 MCG: 50 INJECTION, SOLUTION INTRAMUSCULAR; INTRAVENOUS at 10:26

## 2018-07-09 NOTE — PROGRESS NOTES
VASCULAR & INTERVENTIONAL RADIOLOGY PROGRESS NOTE    History and Physical reviewed; I have examined the patient and there are no pertinent changes. Pt is an appropriate candidate to undergo CT guided right lung biopsy under moderate sedation.     Updated Physical Exam: Dr Trace Oleary  Lungs: CTA  Heart: RRR, without murmur    Julianne Byrd MD  Vascular & Interventional Radiology  [facility]  7/9/2018

## 2018-07-09 NOTE — PERIOP NOTES
BS of 243 relayed to Dr. Damian Mcdonald thru [employer] CV tech. States that is fine. No orders given.

## 2018-07-09 NOTE — PERIOP NOTES
0860 Left  message to Radiology Nurse at [phone] re: . Pt states this is about how her BS normally runs. [facility] [place] [facility] to report BS but no answer.

## 2018-07-09 NOTE — PROCEDURES
Vascular & Interventional Radiology Brief Procedure Note    Interventional Radiologist: Sergey Loera    Pre-operative Diagnosis:  Right lung mass. Post-operative Diagnosis: Same as pre-op dx    Procedure(s) Performed:  CT guided core biopsy of right lung mass. Anesthesia:  Local and Moderate Sedation    Findings:  Successful core (20G) biopsy of right lung mass. Total 5 passes performed. Atypical cells noted on initial touch prep per Dr. Murrel Dancer (pathology). Complications: None    Estimated Blood Loss:  minimal    Tubes and Drains: None    Specimens: 5 - 20 G cores. Condition: Good    Disposition: For CXR. Then to [facility]. Plan: Monitor for 4 hours with F/U CXR. F/U with Dr. Orlin Allan.       Milo Edge MD  [facility]  Vascular & Interventional Radiology  7/9/2018

## 2018-07-09 NOTE — PROGRESS NOTES
Patient arrived to CT for scheduled right lung biopy. Pt is alert and oriented x4. Pt Denies pain. Pt is a former 1PPD smoker for 20 years, quit 14 years ago. She reports a non-productive cough. Skin warm and dry. Pt's heart and lung assessment by Dr. Debbe Lesches and Jerson Davila, RN to find S2S2 heart tones WNL. Lung sounds diminished in right lower lobe. Pt will be prone on CT table for procedure. Gilberto Cervantes in pathology made aware, Dr. Melendez Patilazaro is on standby. Labs reviewed. Consent complete, time for questions and explanations made.

## 2018-07-09 NOTE — IP AVS SNAPSHOT
[facility] 
 
 
 [address] Patient: Marie Brown MRN: [id number] About your hospitalization You were admitted on:  July 9, 2018 You last received care in the:  [address] [place] [address] You were discharged on:  July 9, 2018 Why you were hospitalized Your primary diagnosis was:  Not on File Follow-up Information Follow up With Details Comments Contact Info Ruy Walls MD   [address] 
[phone] Discharge Orders None A check tessa indicates which time of day the medication should be taken. My Medications CONTINUE taking these medications Instructions Each Dose to Equal  
 Morning Noon Evening Bedtime  
 amLODIPine 5 mg tablet Commonly known as:  Elpatrice Tsaier Your last dose was: Your next dose is: Take 5 mg by mouth daily. 5 mg HUMALOG MIX 75-25 KWIKPEN SC Your last dose was: Your next dose is:    
   
   
 50 Units by SubCUTAneous route two (2) times a day. 50 Units VITAMIN D2 50,000 unit capsule Generic drug:  ergocalciferol Your last dose was: Your next dose is: Take 50,000 Units by mouth every seven (7) days. [address]9767 Units Discharge Instructions Percutaneous Lung Biopsy: What to Expect at [facility] Your Recovery A percutaneous (say \"per-chanell-Siria-us) lung biopsy is a procedure to take a sample (biopsy) of lung tissue. The doctor puts a long needle through your chest wall to get the sample. Another doctor will look at the lung tissue with a microscope to check for infection, cancer, or other lung problems. This procedure is also called a needle biopsy.  
You may be sore where the doctor made the cut (incision) in your skin and put in the biopsy needle. You may feel some pain in your lung when you take a deep breath. These symptoms usually get better in a few days. If you cough up mucus, there may be streaks of blood in the mucus for the first week after the procedure. You may need to take it easy at home for a day or two after the procedure. For 1 week, try to avoid heavy lifting and strenuous activities. These activities could cause bleeding from the biopsy site. It can take several days to get the results of the biopsy. The doctor or nurse will discuss the results with you. This care sheet gives you a general idea about how long it will take for you to recover. But each person recovers at a different pace. Follow the steps below to feel better as quickly as possible. How can you care for yourself at home? Activity ? · Rest when you feel tired. Getting enough sleep will help you recover. ? · Try to walk each day. Start by walking a little more than you did the day before. Bit by bit, increase the amount you walk. Walking boosts blood flow and helps prevent pneumonia and constipation. ? · Avoid strenuous activities, such as bicycle riding, jogging, weight lifting, or aerobic exercise, for 1 week or until your doctor says it is okay. ? · For 1 week, avoid lifting anything that would make you strain. This may include a child, heavy grocery bags and milk containers, a heavy briefcase or backpack, cat litter or dog food bags, or a vacuum . ? · Ask your doctor when you can drive again. ? · You may need to take 1 or 2 days off from work. It depends on the type of work you do and how you feel. ? · You may shower 1 or 2 days after the procedure, if your doctor says it is okay. Pat the incision dry. Do not take a bath for the first week, or until your doctor tells you it is okay. ? · Do not fly in an airplane or dive deeply (such as in scuba diving) until your doctor tells you it is okay.  Avoid any situations where there is increased air pressure. Diet ? · You can eat your normal diet. If your stomach is upset, try bland, low-fat foods like plain rice, broiled chicken, toast, and yogurt. Medicines ? · Your doctor will tell you if and when you can restart your medicines. He or she will also give you instructions about taking any new medicines. ? · If you take blood thinners, such as warfarin (Coumadin), clopidogrel (Plavix), or aspirin, be sure to talk to your doctor. He or she will tell you if and when to start taking those medicines again. Make sure that you understand exactly what your doctor wants you to do. ? · Be safe with medicines. Take pain medicines exactly as directed. ¨ If the doctor gave you a prescription medicine for pain, take it as prescribed. ¨ If you are not taking a prescription pain medicine, ask your doctor if you can take an over-the-counter medicine. ? · If you think your pain medicine is making you sick to your stomach: 
¨ Take your medicine after meals (unless your doctor has told you not to). ¨ Ask your doctor for a different pain medicine. ? · If your doctor prescribed antibiotics, take them as directed. Do not stop taking them just because you feel better. You need to take the full course of antibiotics. Incision care ? · If you have strips of tape on the incision, leave the tape on for a week or until it falls off.  
? · Wash the area daily with warm, soapy water and pat it dry. Don't use hydrogen peroxide or alcohol, which can slow healing. You may cover the area with a gauze bandage if it weeps or rubs against clothing. Change the bandage every day. ? · Keep the area clean and dry. Follow-up care is a key part of your treatment and safety. Be sure to make and go to all appointments, and call your doctor if you are having problems. It's also a good idea to know your test results and keep a list of the medicines you take. When should you call for help? Call 911 anytime you think you may need emergency care. For example, call if: 
? · You passed out (lost consciousness). ? · You have severe trouble breathing. ? · You have sudden chest pain and shortness of breath, or you cough up blood. ?Call your doctor now or seek immediate medical care if: 
? · You are sick to your stomach or cannot keep fluids down. ? · You have pain that does not get better after you take pain medicine. ? · You have a fever over 100°F.  
? · You have signs of infection, such as: 
¨ Increased pain, swelling, warmth, or redness. ¨ Red streaks leading from the incision. ¨ Pus draining from the incision. ¨ Swollen lymph nodes in your neck, armpits, or groin. ¨ A fever. ? · Bright red blood has soaked through the bandage over your incision. ? · You cough up a lot more mucus than normal, or the mucus changes color. ? Watch closely for changes in your health, and be sure to contact your doctor if you have any problems. Where can you learn more? Go to http://tarik-maddy.info/. Enter V071 in the search box to learn more about \"Percutaneous Lung Biopsy: What to Expect at Home. \" Current as of: May 12, 2017 Content Version: 11.4 © 6914-9050 [employer]. Care instructions adapted under license by [employer] (which disclaims liability or warranty for this information). If you have questions about a medical condition or this instruction, always ask your healthcare professional. [place] [address] any warranty or liability for your use of this information. DISCHARGE SUMMARY from Nurse PATIENT INSTRUCTIONS: 
 
After general anesthesia or intravenous sedation, for 24 hours or while taking prescription Narcotics: · Limit your activities · Do not drive and operate hazardous machinery · Do not make important personal or business decisions · Do  not drink alcoholic beverages · If you have not urinated within 8 hours after discharge, please contact your surgeon on call. Report the following to your surgeon: 
· Excessive pain, swelling, redness or odor of or around the surgical area · Temperature over 100.5 · Nausea and vomiting lasting longer than 4 hours or if unable to take medications · Any signs of decreased circulation or nerve impairment to extremity: change in color, persistent  numbness, tingling, coldness or increase pain · Any questions What to do at Home: These are general instructions for a healthy lifestyle: No smoking/ No tobacco products/ Avoid exposure to second hand smoke Surgeon General's Warning:  Quitting smoking now greatly reduces serious risk to your health. Obesity, smoking, and sedentary lifestyle greatly increases your risk for illness A healthy diet, regular physical exercise & weight monitoring are important for maintaining a healthy lifestyle You may be retaining fluid if you have a history of heart failure or if you experience any of the following symptoms:  Weight gain of 3 pounds or more overnight or 5 pounds in a week, increased swelling in our hands or feet or shortness of breath while lying flat in bed. Please call your doctor as soon as you notice any of these symptoms; do not wait until your next office visit. Recognize signs and symptoms of STROKE: 
 
F-face looks uneven A-arms unable to move or move unevenly S-speech slurred or non-existent T-time-call 911 as soon as signs and symptoms begin-DO NOT go Back to bed or wait to see if you get better-TIME IS BRAIN. Warning Signs of HEART ATTACK Call 911 if you have these symptoms: 
? Chest discomfort. Most heart attacks involve discomfort in the center of the chest that lasts more than a few minutes, or that goes away and comes back. It can feel like uncomfortable pressure, squeezing, fullness, or pain. ? Discomfort in other areas of the upper body. Symptoms can include pain or discomfort in one or both arms, the back, neck, jaw, or stomach. ? Shortness of breath with or without chest discomfort. ? Other signs may include breaking out in a cold sweat, nausea, or lightheadedness. Don't wait more than five minutes to call [address]! Fast action can save your life. Calling 911 is almost always the fastest way to get lifesaving treatment. Emergency Medical Services staff can begin treatment when they arrive  up to an hour sooner than if someone gets to the hospital by car. The discharge information has been reviewed with the patient. The patient verbalized understanding. Discharge medications reviewed with the patient and appropriate educational materials and side effects teaching were provided. ___________________________________________________________________________________________________________________________________ Patient armband removed and given to patient to take home. Patient was informed of the privacy risks if armband lost or stolen Introducing [facility]! [employer] introduces [employer] patient portal. Now you can access parts of your medical record, email your doctor's office, and request medication refills online. 1. In your internet browser, go to [email or web address] 2. Click on the First Time User? Click Here link in the Sign In box. You will see the New Member Sign Up page. 3. Enter your [employer] Access Code exactly as it appears below. You will not need to use this code after youve completed the sign-up process. If you do not sign up before the expiration date, you must request a new code. · [employer] Access Code: [email or web address] Expires: 9/11/2018  3:29 PM 
 
4. Enter the last four digits of your Social Security Number (xxxx) and Date of Birth (mm/dd/yyyy) as indicated and click Submit.  You will be taken to the next sign-up page. 5. Create a AIFOTECt ID. This will be your [employer] login ID and cannot be changed, so think of one that is secure and easy to remember. 6. Create a AIFOT[employer]t password. You can change your password at any time. 7. Enter your Password Reset Question and Answer. This can be used at a later time if you forget your password. 8. Enter your e-mail address. You will receive e-mail notification when new information is available in [facility] 9. Click Sign Up. You can now view and download portions of your medical record. 10. Click the Download Summary menu link to download a portable copy of your medical information. If you have questions, please visit the Frequently Asked Questions section of the [employer] website. Remember, [employer] is NOT to be used for urgent needs. For medical emergencies, dial 911. Now available from your iPhone and Android! Introducing Brandon Kamara As a [facility] patient, I wanted to make you aware of our electronic visit tool called Brandon Kamara. [employer] [address] 24/7 allows you to connect within minutes with a medical provider 24 hours a day, seven days a week via a mobile device or tablet or logging into a secure website from your computer. You can access Brandon Kamara from anywhere in the [place]. A virtual visit might be right for you when you have a simple condition and feel like you just dont want to get out of bed, or cant get away from work for an appointment, when your regular [facility] provider is not available (evenings, weekends or holidays), or when youre out of town and need minor care. Electronic visits cost only $49 and if the [facility] 24/7 provider determines a prescription is needed to treat your condition, one can be electronically transmitted to a nearby pharmacy*. Please take a moment to enroll today if you have not already done so.   The enrollment process is free and takes just a few minutes. To enroll, please download the [employer] 24/7 nahun to your tablet or phone, or visit [email or web address] to enroll on your computer. And, as an [address] [place] [address] patient with a [employer] account, the results of your visits will be scanned into your electronic medical record and your primary care provider will be able to view the scanned results. We urge you to continue to see your regular [facility] provider for your ongoing medical care. And while your primary care provider may not be the one available when you seek a [employer] virtual visit, the peace of mind you get from getting a real diagnosis real time can be priceless. For more information on [employer], view our Frequently Asked Questions (FAQs) at [email or web address]. Sincerely, 
 
Kal Haider MD 
Chief Medical Officer [employer] Marleni Souza *:  certain medications cannot be prescribed via [employer] Unresulted Labs-Please follow up with your PCP about these lab tests Order Current Status XR CHEST INSP AND EXP In process Providers Seen During Your Hospitalization Provider Specialty Primary office phone Angela Mcduffie MD Oncology [phone] Your Primary Care Physician (PCP) Primary Care Physician Office Phone Office Fax Artur Maldonado, [address] [facility] [phone] You are allergic to the following No active allergies Recent Documentation Height Smoking Status 1.702 m Former Smoker Emergency Contacts Name Discharge Info Relation Home Work Mobile [address] CAREGIVER [3] Spouse [3] [phone] Patient Belongings The following personal items are in your possession at time of discharge: 
  Dental Appliances:  With patient         Home Medications: Kept at bedside   Jewelry: Earrings, With patient  Clothing: Other (comment), Footwear, Undergarments (skirt and blouse)    Other Valuables: Purse Please provide this summary of care documentation to your next provider. Signatures-by signing, you are acknowledging that this After Visit Summary has been reviewed with you and you have received a copy. Patient Signature:  ____________________________________________________________ Date:  ____________________________________________________________  
  
Althia Kras Provider Signature:  ____________________________________________________________ Date:  ____________________________________________________________

## 2018-07-09 NOTE — PROGRESS NOTES
Specimens (5 passes) with Dr. Nena Kirk. Post Procedure Pause completed. 4x4 and tegaderm to cover incision site, no bleeding, oozing, or bruising noted. Taking to x-ray prior to     TRANSFER - OUT REPORT:    Verbal report given to Jennifer Longo CharlyRN(name) on Eng Laina  being transferred to  POD 2(unit) for routine progression of care       Report consisted of patients Situation, Background, Assessment and   Recommendations(SBAR). Information from the following report(s) SBAR, Procedure Summary, Intake/Output, MAR and Recent Results was reviewed with the receiving nurse. Lines:   Peripheral IV 07/09/18 Right Antecubital (Active)   Site Assessment Clean, dry, & intact 7/9/2018  8:34 AM   Phlebitis Assessment 0 7/9/2018  8:34 AM   Infiltration Assessment 0 7/9/2018  8:34 AM   Dressing Status Clean, dry, & intact 7/9/2018  8:34 AM   Dressing Type Tape;Transparent 7/9/2018  8:34 AM   Hub Color/Line Status Pink; Infusing 7/9/2018  8:34 AM   Alcohol Cap Used Yes 7/9/2018  8:34 AM        Opportunity for questions and clarification was provided.       Patient transported with:   Monitor  O2 @ 2 liters  Tech        Dr. Marcella Ureña is caring for this patient, he is at [facility] [phone] or [facility]# [phone]

## 2018-07-09 NOTE — DISCHARGE INSTRUCTIONS
Percutaneous Lung Biopsy: What to Expect at [address] [place] [address]    A percutaneous (say \"per-mireyaw-KIMBER-nee-us) lung biopsy is a procedure to take a sample (biopsy) of lung tissue. The doctor puts a long needle through your chest wall to get the sample. Another doctor will look at the lung tissue with a microscope to check for infection, cancer, or other lung problems. This procedure is also called a needle biopsy. You may be sore where the doctor made the cut (incision) in your skin and put in the biopsy needle. You may feel some pain in your lung when you take a deep breath. These symptoms usually get better in a few days. If you cough up mucus, there may be streaks of blood in the mucus for the first week after the procedure. You may need to take it easy at home for a day or two after the procedure. For 1 week, try to avoid heavy lifting and strenuous activities. These activities could cause bleeding from the biopsy site. It can take several days to get the results of the biopsy. The doctor or nurse will discuss the results with you. This care sheet gives you a general idea about how long it will take for you to recover. But each person recovers at a different pace. Follow the steps below to feel better as quickly as possible. How can you care for yourself at home? Activity  ? · Rest when you feel tired. Getting enough sleep will help you recover. ? · Try to walk each day. Start by walking a little more than you did the day before. Bit by bit, increase the amount you walk. Walking boosts blood flow and helps prevent pneumonia and constipation. ? · Avoid strenuous activities, such as bicycle riding, jogging, weight lifting, or aerobic exercise, for 1 week or until your doctor says it is okay. ? · For 1 week, avoid lifting anything that would make you strain.  This may include a child, heavy grocery bags and milk containers, a heavy briefcase or backpack, cat litter or dog food bags, or a vacuum . ? · Ask your doctor when you can drive again. ? · You may need to take 1 or 2 days off from work. It depends on the type of work you do and how you feel. ? · You may shower 1 or 2 days after the procedure, if your doctor says it is okay. Pat the incision dry. Do not take a bath for the first week, or until your doctor tells you it is okay. ? · Do not fly in an airplane or dive deeply (such as in scuba diving) until your doctor tells you it is okay. Avoid any situations where there is increased air pressure. Diet  ? · You can eat your normal diet. If your stomach is upset, try bland, low-fat foods like plain rice, broiled chicken, toast, and yogurt. Medicines  ? · Your doctor will tell you if and when you can restart your medicines. He or she will also give you instructions about taking any new medicines. ? · If you take blood thinners, such as warfarin (Coumadin), clopidogrel (Plavix), or aspirin, be sure to talk to your doctor. He or she will tell you if and when to start taking those medicines again. Make sure that you understand exactly what your doctor wants you to do. ? · Be safe with medicines. Take pain medicines exactly as directed. ¨ If the doctor gave you a prescription medicine for pain, take it as prescribed. ¨ If you are not taking a prescription pain medicine, ask your doctor if you can take an over-the-counter medicine. ? · If you think your pain medicine is making you sick to your stomach:  ¨ Take your medicine after meals (unless your doctor has told you not to). ¨ Ask your doctor for a different pain medicine. ? · If your doctor prescribed antibiotics, take them as directed. Do not stop taking them just because you feel better. You need to take the full course of antibiotics. Incision care  ? · If you have strips of tape on the incision, leave the tape on for a week or until it falls off.   ? · Wash the area daily with warm, soapy water and pat it dry.  Don't use hydrogen peroxide or alcohol, which can slow healing. You may cover the area with a gauze bandage if it weeps or rubs against clothing. Change the bandage every day. ? · Keep the area clean and dry. Follow-up care is a key part of your treatment and safety. Be sure to make and go to all appointments, and call your doctor if you are having problems. It's also a good idea to know your test results and keep a list of the medicines you take. When should you call for help? Call 911 anytime you think you may need emergency care. For example, call if:  ? · You passed out (lost consciousness). ? · You have severe trouble breathing. ? · You have sudden chest pain and shortness of breath, or you cough up blood. ?Call your doctor now or seek immediate medical care if:  ? · You are sick to your stomach or cannot keep fluids down. ? · You have pain that does not get better after you take pain medicine. ? · You have a fever over 100°F.   ? · You have signs of infection, such as:  ¨ Increased pain, swelling, warmth, or redness. ¨ Red streaks leading from the incision. ¨ Pus draining from the incision. ¨ Swollen lymph nodes in your neck, armpits, or groin. ¨ A fever. ? · Bright red blood has soaked through the bandage over your incision. ? · You cough up a lot more mucus than normal, or the mucus changes color. ? Watch closely for changes in your health, and be sure to contact your doctor if you have any problems. Where can you learn more? Go to http://tarik-maddy.info/. Enter Y974 in the search box to learn more about \"Percutaneous Lung Biopsy: What to Expect at Home. \"  Current as of: May 12, 2017  Content Version: 11.4  © 2431-1598 [employer]. Care instructions adapted under license by "[employer] (which disclaims liability or warranty for this information).  If you have questions about a medical condition or this instruction, always ask your healthcare foster. [address] any warranty or liability for your use of this information. DISCHARGE SUMMARY from Nurse    PATIENT INSTRUCTIONS:    After general anesthesia or intravenous sedation, for 24 hours or while taking prescription Narcotics:  · Limit your activities  · Do not drive and operate hazardous machinery  · Do not make important personal or business decisions  · Do  not drink alcoholic beverages  · If you have not urinated within 8 hours after discharge, please contact your surgeon on call. Report the following to your surgeon:  · Excessive pain, swelling, redness or odor of or around the surgical area  · Temperature over 100.5  · Nausea and vomiting lasting longer than 4 hours or if unable to take medications  · Any signs of decreased circulation or nerve impairment to extremity: change in color, persistent  numbness, tingling, coldness or increase pain  · Any questions    What to do at Home:    These are general instructions for a healthy lifestyle:    No smoking/ No tobacco products/ Avoid exposure to second hand smoke  Surgeon General's Warning:  Quitting smoking now greatly reduces serious risk to your health. Obesity, smoking, and sedentary lifestyle greatly increases your risk for illness    A healthy diet, regular physical exercise & weight monitoring are important for maintaining a healthy lifestyle    You may be retaining fluid if you have a history of heart failure or if you experience any of the following symptoms:  Weight gain of 3 pounds or more overnight or 5 pounds in a week, increased swelling in our hands or feet or shortness of breath while lying flat in bed. Please call your doctor as soon as you notice any of these symptoms; do not wait until your next office visit.     Recognize signs and symptoms of STROKE:    F-face looks uneven    A-arms unable to move or move unevenly    S-speech slurred or non-existent    T-time-call 911 as soon as signs and symptoms begin-DO NOT go       Back to bed or wait to see if you get better-TIME IS BRAIN. Warning Signs of HEART ATTACK     Call 911 if you have these symptoms:   Chest discomfort. Most heart attacks involve discomfort in the center of the chest that lasts more than a few minutes, or that goes away and comes back. It can feel like uncomfortable pressure, squeezing, fullness, or pain.  Discomfort in other areas of the upper body. Symptoms can include pain or discomfort in one or both arms, the back, neck, jaw, or stomach.  Shortness of breath with or without chest discomfort.  Other signs may include breaking out in a cold sweat, nausea, or lightheadedness. Don't wait more than five minutes to call 911 - MINUTES MATTER! Fast action can save your life. Calling 911 is almost always the fastest way to get lifesaving treatment. Emergency Medical Services staff can begin treatment when they arrive -- up to an hour sooner than if someone gets to the hospital by car. The discharge information has been reviewed with the patient. The patient verbalized understanding. Discharge medications reviewed with the patient and appropriate educational materials and side effects teaching were provided. ___________________________________________________________________________________________________________________________________  Patient armband removed and given to patient to take home.   Patient was informed of the privacy risks if armband lost or stolen

## 2018-08-01 ENCOUNTER — HOSPITAL ENCOUNTER (OUTPATIENT)
Dept: MRI IMAGING | Age: 63
Discharge: HOME | End: 2018-08-01
Attending: INTERNAL MEDICINE
Payer: MEDICARE

## 2018-08-01 VITALS — WEIGHT: 149 LBS | BODY MASS INDEX: 23.34 KG/M2

## 2018-08-01 DIAGNOSIS — C34.11 MALIGNANT NEOPLASM OF UPPER LOBE OF RIGHT LUNG (HCC): ICD-10-CM

## 2018-08-01 PROCEDURE — 74011250636 HC RX REV CODE- 250/636: Performed by: INTERNAL MEDICINE

## 2018-08-01 PROCEDURE — A9575 INJ GADOTERATE MEGLUMI 0.1ML: HCPCS | Performed by: INTERNAL MEDICINE

## 2018-08-01 PROCEDURE — 70553 MRI BRAIN STEM W/O & W/DYE: CPT

## 2018-08-01 RX ORDER — GADOTERATE MEGLUMINE 376.9 MG/ML
17 INJECTION INTRAVENOUS
Status: COMPLETED | OUTPATIENT
Start: 2018-08-01 | End: 2018-08-01

## 2018-08-01 RX ADMIN — GADOTERATE MEGLUMINE 17 ML: 376.9 INJECTION INTRAVENOUS at 11:35

## 2018-08-08 ENCOUNTER — APPOINTMENT (OUTPATIENT)
Dept: PET IMAGING | Age: 63
End: 2018-08-08
Payer: MEDICARE

## 2018-08-08 ENCOUNTER — HOSPITAL ENCOUNTER (OUTPATIENT)
Dept: PET IMAGING | Age: 63
Discharge: HOME | End: 2018-08-08
Attending: INTERNAL MEDICINE
Payer: MEDICARE

## 2018-08-08 DIAGNOSIS — C34.11 MALIGNANT NEOPLASM OF UPPER LOBE OF RIGHT LUNG (HCC): ICD-10-CM

## 2018-08-08 PROCEDURE — A9552 F18 FDG: HCPCS

## 2018-10-31 ENCOUNTER — HOSPITAL ENCOUNTER (OUTPATIENT)
Dept: GENERAL RADIOLOGY | Age: 63
Discharge: HOME | End: 2018-10-31
Attending: NURSE PRACTITIONER
Payer: MEDICARE

## 2018-10-31 DIAGNOSIS — C85.90 NON HODGKIN'S LYMPHOMA (HCC): ICD-10-CM

## 2018-10-31 PROCEDURE — 71046 X-RAY EXAM CHEST 2 VIEWS: CPT

## 2018-12-05 ENCOUNTER — HOSPITAL ENCOUNTER (OUTPATIENT)
Dept: CT IMAGING | Age: 63
Discharge: HOME | End: 2018-12-05
Attending: INTERNAL MEDICINE
Payer: MEDICARE

## 2018-12-05 DIAGNOSIS — C85.90 NON HODGKIN'S LYMPHOMA (HCC): ICD-10-CM

## 2018-12-05 LAB — CREAT UR-MCNC: 0.6 MG/DL (ref 0.6–1.3)

## 2018-12-05 PROCEDURE — 74011636320 HC RX REV CODE- 636/320: Performed by: INTERNAL MEDICINE

## 2018-12-05 PROCEDURE — 82565 ASSAY OF CREATININE: CPT

## 2018-12-05 PROCEDURE — 71260 CT THORAX DX C+: CPT

## 2018-12-05 RX ADMIN — IOPAMIDOL 60 ML: 612 INJECTION, SOLUTION INTRAVENOUS at 07:56

## 2018-12-30 ENCOUNTER — HOSPITAL ENCOUNTER (EMERGENCY)
Age: 63
Discharge: HOME | End: 2018-12-30
Attending: EMERGENCY MEDICINE
Payer: MEDICARE

## 2018-12-30 ENCOUNTER — APPOINTMENT (OUTPATIENT)
Dept: GENERAL RADIOLOGY | Age: 63
End: 2018-12-30
Attending: EMERGENCY MEDICINE
Payer: MEDICARE

## 2018-12-30 VITALS
HEIGHT: 66 IN | DIASTOLIC BLOOD PRESSURE: 108 MMHG | HEART RATE: 59 BPM | TEMPERATURE: 98.6 F | WEIGHT: 189 LBS | BODY MASS INDEX: 30.37 KG/M2 | OXYGEN SATURATION: 96 % | SYSTOLIC BLOOD PRESSURE: 136 MMHG | RESPIRATION RATE: 20 BRPM

## 2018-12-30 DIAGNOSIS — R06.02 SOB (SHORTNESS OF BREATH): Primary | ICD-10-CM

## 2018-12-30 LAB
ANION GAP SERPL CALC-SCNC: 7 MMOL/L (ref 3–18)
ATRIAL RATE: 115 BPM
BASOPHILS # BLD: 0 K/UL (ref 0–0.1)
BASOPHILS NFR BLD: 0 % (ref 0–2)
BUN SERPL-MCNC: 16 MG/DL (ref 7–18)
BUN/CREAT SERPL: 17 (ref 12–20)
CALCIUM SERPL-MCNC: 8.9 MG/DL (ref 8.5–10.1)
CALCULATED P AXIS, ECG09: 50 DEGREES
CALCULATED R AXIS, ECG10: 23 DEGREES
CALCULATED T AXIS, ECG11: 60 DEGREES
CHLORIDE SERPL-SCNC: 103 MMOL/L (ref 100–108)
CO2 SERPL-SCNC: 27 MMOL/L (ref 21–32)
CREAT SERPL-MCNC: 0.93 MG/DL (ref 0.6–1.3)
D DIMER PPP FEU-MCNC: 1.17 UG/ML(FEU)
DIAGNOSIS, 93000: NORMAL
DIFFERENTIAL METHOD BLD: ABNORMAL
EOSINOPHIL # BLD: 0.1 K/UL (ref 0–0.4)
EOSINOPHIL NFR BLD: 1 % (ref 0–5)
ERYTHROCYTE [DISTWIDTH] IN BLOOD BY AUTOMATED COUNT: 14.4 % (ref 11.6–14.5)
GLUCOSE SERPL-MCNC: 198 MG/DL (ref 74–99)
HCT VFR BLD AUTO: 38.1 % (ref 35–45)
HGB BLD-MCNC: 12.6 G/DL (ref 12–16)
LYMPHOCYTES # BLD: 0.9 K/UL (ref 0.9–3.6)
LYMPHOCYTES NFR BLD: 7 % (ref 21–52)
MCH RBC QN AUTO: 27.4 PG (ref 24–34)
MCHC RBC AUTO-ENTMCNC: 33.1 G/DL (ref 31–37)
MCV RBC AUTO: 82.8 FL (ref 74–97)
MONOCYTES # BLD: 0.3 K/UL (ref 0.05–1.2)
MONOCYTES NFR BLD: 2 % (ref 3–10)
NEUTS SEG # BLD: 11.9 K/UL (ref 1.8–8)
NEUTS SEG NFR BLD: 90 % (ref 40–73)
P-R INTERVAL, ECG05: 142 MS
PLATELET # BLD AUTO: 216 K/UL (ref 135–420)
PMV BLD AUTO: 9.6 FL (ref 9.2–11.8)
POTASSIUM SERPL-SCNC: 3.8 MMOL/L (ref 3.5–5.5)
Q-T INTERVAL, ECG07: 346 MS
QRS DURATION, ECG06: 78 MS
QTC CALCULATION (BEZET), ECG08: 478 MS
RBC # BLD AUTO: 4.6 M/UL (ref 4.2–5.3)
SODIUM SERPL-SCNC: 137 MMOL/L (ref 136–145)
TROPONIN I SERPL-MCNC: <0.02 NG/ML (ref 0–0.04)
VENTRICULAR RATE, ECG03: 115 BPM
WBC # BLD AUTO: 13.2 K/UL (ref 4.6–13.2)

## 2018-12-30 PROCEDURE — 80048 BASIC METABOLIC PNL TOTAL CA: CPT

## 2018-12-30 PROCEDURE — 84484 ASSAY OF TROPONIN QUANT: CPT

## 2018-12-30 PROCEDURE — 99284 EMERGENCY DEPT VISIT MOD MDM: CPT

## 2018-12-30 PROCEDURE — 71046 X-RAY EXAM CHEST 2 VIEWS: CPT

## 2018-12-30 PROCEDURE — 74011000250 HC RX REV CODE- 250: Performed by: EMERGENCY MEDICINE

## 2018-12-30 PROCEDURE — 94640 AIRWAY INHALATION TREATMENT: CPT

## 2018-12-30 PROCEDURE — 85025 COMPLETE CBC W/AUTO DIFF WBC: CPT

## 2018-12-30 PROCEDURE — 77030029684 HC NEB SM VOL KT MONA -A

## 2018-12-30 PROCEDURE — 93005 ELECTROCARDIOGRAM TRACING: CPT

## 2018-12-30 PROCEDURE — 85379 FIBRIN DEGRADATION QUANT: CPT

## 2018-12-30 PROCEDURE — 77030003560 HC NDL HUBR BARD -A

## 2018-12-30 RX ORDER — PREDNISONE 5 MG/1
5 TABLET ORAL
COMMUNITY
End: 2020-03-09

## 2018-12-30 RX ORDER — IPRATROPIUM BROMIDE AND ALBUTEROL SULFATE 2.5; .5 MG/3ML; MG/3ML
3 SOLUTION RESPIRATORY (INHALATION)
Status: COMPLETED | OUTPATIENT
Start: 2018-12-30 | End: 2018-12-30

## 2018-12-30 RX ADMIN — IPRATROPIUM BROMIDE AND ALBUTEROL SULFATE 3 ML: .5; 3 SOLUTION RESPIRATORY (INHALATION) at 09:00

## 2018-12-30 NOTE — ED NOTES
Discharge instructions given for primary nurse Tatiana ANDERSON. Discharge instructions given to patient, patient verbalizes understanding of instructions. Patient alert and oriented x3, denies pain or shortness of breath at this time. Patient ambulatory, gait steady. Patient armband removed and given to patient to take home.   Patient was informed of the privacy risks if armband lost or stolen

## 2018-12-30 NOTE — DISCHARGE INSTRUCTIONS
Shortness of Breath: Care Instructions  Your Care Instructions  Shortness of breath has many causes. Sometimes conditions such as anxiety can lead to shortness of breath. Some people get mild shortness of breath when they exercise. Trouble breathing also can be a symptom of a serious problem, such as asthma, lung disease, emphysema, heart problems, and pneumonia. If your shortness of breath continues, you may need tests and treatment. Watch for any changes in your breathing and other symptoms. Follow-up care is a key part of your treatment and safety. Be sure to make and go to all appointments, and call your doctor if you are having problems. It's also a good idea to know your test results and keep a list of the medicines you take. How can you care for yourself at home? · Do not smoke or allow others to smoke around you. If you need help quitting, talk to your doctor about stop-smoking programs and medicines. These can increase your chances of quitting for good. · Get plenty of rest and sleep. · Take your medicines exactly as prescribed. Call your doctor if you think you are having a problem with your medicine. · Find healthy ways to deal with stress. ? Exercise daily. ? Get plenty of sleep. ? Eat regularly and well. When should you call for help? Call 911 anytime you think you may need emergency care. For example, call if:    · You have severe shortness of breath.     · You have symptoms of a heart attack. These may include:  ? Chest pain or pressure, or a strange feeling in the chest.  ? Sweating. ? Shortness of breath. ? Nausea or vomiting. ? Pain, pressure, or a strange feeling in the back, neck, jaw, or upper belly or in one or both shoulders or arms. ? Lightheadedness or sudden weakness. ? A fast or irregular heartbeat. After you call 911, the  may tell you to chew 1 adult-strength or 2 to 4 low-dose aspirin. Wait for an ambulance.  Do not try to drive yourself.    Call your doctor now or seek immediate medical care if:    · Your shortness of breath gets worse or you start to wheeze. Wheezing is a high-pitched sound when you breathe.     · You wake up at night out of breath or have to prop your head up on several pillows to breathe.     · You are short of breath after only light activity or while at rest.    Watch closely for changes in your health, and be sure to contact your doctor if:    · You do not get better over the next 1 to 2 days. Where can you learn more? Go to http://tarik-maddy.info/. Enter S780 in the search box to learn more about \"Shortness of Breath: Care Instructions. \"  Current as of: December 6, 2017  Content Version: 11.8  © 6112-2239 [employer]. Care instructions adapted under license by [employer] (which disclaims liability or warranty for this information). If you have questions about a medical condition or this instruction, always ask your healthcare professional. [address] any warranty or liability for your use of this information.

## 2018-12-30 NOTE — ED PROVIDER NOTES
EMERGENCY DEPARTMENT HISTORY AND PHYSICAL EXAM    9:01 AM      Date: 12/30/2018  Patient Name: Tmi Huerta    History of Presenting Illness     Chief Complaint   Patient presents with    Shortness of Breath         History Provided By: Patient    Chief Complaint: SOB  Duration: \"this morning\" Hours  Timing:  Worsening  Location: Generalized  Quality: N/A  Severity: Mild  Modifying Factors: sitting up mildly alleviates the SOB and her at home inhaler has had no significant relief  Associated Symptoms: improving cough and subjective fever      Additional History (Context): Tim Huerta is a 70 y.o. female with HTN, DM, COPD, emphysema, and lung CA per the pt who presents with worsening, mild SOB onset x \"this morning\" with an associated improving cough. Pt reports her SOB is chronic but this morning it felt worse than usual. Sitting up mildly alleviates the SOB and her at home inhaler has had no significant relief. She states she is currently waiting for a nebulizer to be delivered to her. The pt was seen last week by her PCP where she received a breathing treatment. She has also been taking Prednisone for about a month which she states has been helping her cough. Pt reports her last radiation treatment was October 2018 and is in remission for her lung CA. Pt states she quit smoking over 15 years ago. Denies hx of blood clots, MI, CHF, and stroke. Denies leg swelling. No further concerns or complaints at this time. PCP: Rebekah Raza MD    Current Outpatient Medications   Medication Sig Dispense Refill    predniSONE (DELTASONE) 5 mg tablet Take 5 mg by mouth.  inhalational spacing device 1 Each by Does Not Apply route as needed. 1 Device 0    amLODIPine (NORVASC) 5 mg tablet Take 5 mg by mouth daily.  insulin lispro protamin/lispro (HUMALOG MIX 75-25 KWIKPEN SC) 50 Units by SubCUTAneous route two (2) times a day.          Past History     Past Medical History:  Past Medical History:   Diagnosis Date    Diabetes mellitus ([facility] [address]     Hypertension        Past Surgical History:  Past Surgical History:   Procedure Laterality Date    ABDOMEN SURGERY PROC UNLISTED      HX APPENDECTOMY      HX TONSILLECTOMY         Family History:  History reviewed. No pertinent family history. Social History:  Social History     Tobacco Use    Smoking status: Former Smoker     Years: 24.00     Last attempt to quit: 1978     Years since quittin.5    Smokeless tobacco: Never Used   Substance Use Topics    Alcohol use: No    Drug use: No       Allergies:  No Known Allergies      Review of Systems     Review of Systems   Constitutional: Positive for fever (subjective). Respiratory: Positive for cough (improving) and shortness of breath. Cardiovascular: Negative for leg swelling. All other systems reviewed and are negative. Physical Exam     Visit Vitals  BP (!) 195/94 (BP 1 Location: Right arm, BP Patient Position: Sitting)   Pulse (!) 59   Temp 98.6 °F (37 °C)   Resp 20   Ht 5' 6\" (1.676 m)   Wt 85.7 kg (189 lb)   SpO2 97%   Breastfeeding? No   BMI 30.51 kg/m²         Physical Exam   Constitutional: She is oriented to person, place, and time. She appears well-developed and well-nourished. HENT:   Head: Normocephalic and atraumatic. Neck: Neck supple. No JVD present. Cardiovascular: Regular rhythm. Tachycardia present. Pulmonary/Chest: Effort normal and breath sounds normal. No respiratory distress. Pt has a port in place at the right chest wall. Abdominal: Soft. She exhibits no distension. There is no tenderness. There is no rebound and no guarding. Musculoskeletal: She exhibits no edema. No joint tenderness   Neurological: She is alert and oriented to person, place, and time. Skin: Skin is warm and dry. No erythema.    Psychiatric: Judgment normal.         Diagnostic Study Results     Labs -  Recent Results (from the past 12 hour(s))   EKG, 12 LEAD, INITIAL    Collection Time: 12/30/18  9:18 AM   Result Value Ref Range    Ventricular Rate 115 BPM    Atrial Rate 115 BPM    P-R Interval 142 ms    QRS Duration 78 ms    Q-T Interval 346 ms    QTC Calculation (Bezet) 478 ms    Calculated P Axis 50 degrees    Calculated R Axis 23 degrees    Calculated T Axis 60 degrees    Diagnosis       Sinus tachycardia with premature atrial complexes  Right atrial enlargement  Minimal voltage criteria for LVH, may be normal variant  Borderline ECG  No previous ECGs available     CBC WITH AUTOMATED DIFF    Collection Time: 12/30/18  9:27 AM   Result Value Ref Range    WBC 13.2 4.6 - 13.2 K/uL    RBC 4.60 4.20 - 5.30 M/uL    HGB 12.6 12.0 - 16.0 g/dL    HCT 38.1 35.0 - 45.0 %    MCV 82.8 74.0 - 97.0 FL    MCH 27.4 24.0 - 34.0 PG    MCHC 33.1 31.0 - 37.0 g/dL    RDW 14.4 11.6 - 14.5 %    PLATELET 180 750 - 684 K/uL    MPV 9.6 9.2 - 11.8 FL    NEUTROPHILS 90 (H) 40 - 73 %    LYMPHOCYTES 7 (L) 21 - 52 %    MONOCYTES 2 (L) 3 - 10 %    EOSINOPHILS 1 0 - 5 %    BASOPHILS 0 0 - 2 %    ABS. NEUTROPHILS 11.9 (H) 1.8 - 8.0 K/UL    ABS. LYMPHOCYTES 0.9 0.9 - 3.6 K/UL    ABS. MONOCYTES 0.3 0.05 - 1.2 K/UL    ABS. EOSINOPHILS 0.1 0.0 - 0.4 K/UL    ABS.  BASOPHILS 0.0 0.0 - 0.1 K/UL    DF AUTOMATED     METABOLIC PANEL, BASIC    Collection Time: 12/30/18  9:27 AM   Result Value Ref Range    Sodium 137 136 - 145 mmol/L    Potassium 3.8 3.5 - 5.5 mmol/L    Chloride 103 100 - 108 mmol/L    CO2 27 21 - 32 mmol/L    Anion gap 7 3.0 - 18 mmol/L    Glucose 198 (H) 74 - 99 mg/dL    BUN 16 7.0 - 18 MG/DL    Creatinine 0.93 0.6 - 1.3 MG/DL    BUN/Creatinine ratio 17 12 - 20      GFR est AA >60 >60 ml/min/1.73m2    GFR est non-AA >60 >60 ml/min/1.73m2    Calcium 8.9 8.5 - 10.1 MG/DL   TROPONIN I    Collection Time: 12/30/18  9:27 AM   Result Value Ref Range    Troponin-I, QT <0.02 0.0 - 0.045 NG/ML   D DIMER    Collection Time: 12/30/18  9:27 AM   Result Value Ref Range    D DIMER 1.17 (H) <0.46 ug/ml(FEU)       Radiologic Studies - XR CHEST PA LAT    (Results Pending)   no acute change from prior      Medical Decision Making   I am the first provider for this patient. I reviewed the vital signs, available nursing notes, past medical history, past surgical history, family history and social history. Vital Signs-Reviewed the patient's vital signs. Pulse Oximetry Analysis - 97% on room air, normal.    EKG: Interpreted by the EP. Time Interpreted: 9:18   Rate: 115 bpm   Rhythm: sinus tachycardia   Interpretation: normal intervals, no ST changes   Comparison: no prior for comparison    Records Reviewed: Nursing Notes (Time of Review: 9:01 AM)    ED Course: Progress Notes, Reevaluation, and Consults:    Provider Notes (Medical Decision Making): 69 y/o female presents with sob. No wheeze, will give neb as she reports prior improvement, noted tachycardia so will check d-dimer to eval for PE. No sxs of DVT on exam. Screen for pna, acs. Noted elevated d-dimer, however pt with recent ct chest this month and she is feeling better after neb. cxr appears similar to baseline. Will dc home with spacer and inhaler to use until neb. Pt has appts this week with oncology and pulmonology. No sign of overlying infection. Stable for dc home. Discussed return precautions. Diagnosis     Clinical Impression:   1.  SOB (shortness of breath)        Disposition: Discharged    Follow-up Information     Follow up With Specialties Details Why Contact [facility] EMERGENCY DEPT Emergency Medicine  As needed [address]  [phone]    Chris Gay MD Internal Medicine Call 1-2 days for follow up appointment [address] [place] [address] [phone]      Lakisha Arriaga MD Pulmonary Disease, Geriatric Medicine Call 1-2 days for follow up appointment [address]      Mandy Adler MD Oncology Call 1-2 days for follow up appointment [facility] [address]                Medication List      START taking these medications    inhalational spacing device  1 Each by Does Not Apply route as needed. ASK your doctor about these medications    amLODIPine 5 mg tablet  Commonly known as:  NORVASC     HUMALOG MIX 75-25 KWIKPEN SC     predniSONE 5 mg tablet  Commonly known as:  Brea Smith           Where to Get Your Medications      Information about where to get these medications is not yet available    Ask your nurse or doctor about these medications  · inhalational spacing device       _______________________________       Scribe Attestation     Nisreen Qureshi acting as a scribe for and in the presence of Kaylee Zapien DO      December 30, 2018 at 9:01 AM       Provider Attestation:      I personally performed the services described in the documentation, reviewed the documentation, as recorded by the scribe in my presence, and it accurately and completely records my words and actions.  December 30, 2018 at 9:01 AM - Kaylee Zapien DO        _______________________________

## 2019-01-24 ENCOUNTER — HOSPITAL ENCOUNTER (EMERGENCY)
Age: 64
Discharge: HOME | End: 2019-01-24
Attending: EMERGENCY MEDICINE
Payer: MEDICARE

## 2019-01-24 VITALS
HEART RATE: 108 BPM | WEIGHT: 284 LBS | SYSTOLIC BLOOD PRESSURE: 154 MMHG | DIASTOLIC BLOOD PRESSURE: 94 MMHG | BODY MASS INDEX: 44.57 KG/M2 | OXYGEN SATURATION: 98 % | TEMPERATURE: 98.4 F | HEIGHT: 67 IN | RESPIRATION RATE: 16 BRPM

## 2019-01-24 DIAGNOSIS — L02.91 ABSCESS: Primary | ICD-10-CM

## 2019-01-24 PROCEDURE — 75810000289 HC I&D ABSCESS SIMP/COMP/MULT

## 2019-01-24 PROCEDURE — 99282 EMERGENCY DEPT VISIT SF MDM: CPT

## 2019-01-24 PROCEDURE — 77030018836 HC SOL IRR NACL ICUM -A

## 2019-01-24 RX ORDER — HYDROCODONE BITARTRATE AND ACETAMINOPHEN 5; 325 MG/1; MG/1
1 TABLET ORAL
Qty: 10 TAB | Refills: 0 | Status: SHIPPED | OUTPATIENT
Start: 2019-01-24 | End: 2019-04-24

## 2019-01-24 RX ORDER — CLINDAMYCIN HYDROCHLORIDE 300 MG/1
300 CAPSULE ORAL 4 TIMES DAILY
Qty: 28 CAP | Refills: 0 | Status: SHIPPED | OUTPATIENT
Start: 2019-01-24 | End: 2019-01-31

## 2019-01-24 NOTE — ED PROVIDER NOTES
EMERGENCY DEPARTMENT HISTORY AND PHYSICAL EXAM    Date: 2019  Patient Name: Wilfredo Rojas    History of Presenting Illness     Chief Complaint   Patient presents with    Abscess         History Provided By patient     Chief Complaint: abscess  Duration: few days  Timing: acute  Location: L axilla  Quality throbbing   Severity:moderate  Modifying Factors: warm compress has not helped  Associated Symptoms: none       Additional History (Context): Wilfredo Rojas is a 70 y.o. female with PMH htn and DM who presents with complaints of a painful abscess to the L axilla x several days. Pt states she has applied warm compress with no relief in sx. Denies fever and chills. No other complaints. PCP: Víctor Christian MD    Current Outpatient Medications   Medication Sig Dispense Refill    clindamycin (CLEOCIN) 300 mg capsule Take 1 Cap by mouth four (4) times daily for 7 days. 28 Cap 0    HYDROcodone-acetaminophen (NORCO) 5-325 mg per tablet Take 1 Tab by mouth every four (4) hours as needed for Pain. Max Daily Amount: 6 Tabs. 10 Tab 0    predniSONE (DELTASONE) 5 mg tablet Take 5 mg by mouth.  amLODIPine (NORVASC) 5 mg tablet Take 5 mg by mouth daily.  insulin lispro protamin/lispro (HUMALOG MIX 75-25 KWIKPEN SC) 50 Units by SubCUTAneous route two (2) times a day.  inhalational spacing device 1 Each by Does Not Apply route as needed. 1 Device 0       Past History     Past Medical History:  Past Medical History:   Diagnosis Date    Diabetes mellitus ([address]     Hypertension        Past Surgical History:  Past Surgical History:   Procedure Laterality Date    ABDOMEN SURGERY PROC UNLISTED      HX APPENDECTOMY      HX TONSILLECTOMY         Family History:  History reviewed. No pertinent family history.     Social History:  Social History     Tobacco Use    Smoking status: Former Smoker     Years: 24.00     Last attempt to quit: 1978     Years since quittin.5    Smokeless tobacco: Never Used   Substance Use Topics    Alcohol use: No    Drug use: No       Allergies:  No Known Allergies      Review of Systems   Review of Systems   Constitutional: Negative. Negative for chills and fever. HENT: Negative. Negative for congestion, ear pain and rhinorrhea. Eyes: Negative. Negative for pain and redness. Respiratory: Negative. Negative for cough, shortness of breath, wheezing and stridor. Cardiovascular: Negative. Negative for chest pain and leg swelling. Gastrointestinal: Negative. Negative for abdominal pain, constipation, diarrhea, nausea and vomiting. Genitourinary: Negative. Negative for dysuria and frequency. Musculoskeletal: Negative. Negative for back pain and neck pain. Skin: Positive for wound. Negative for rash. Neurological: Negative. Negative for dizziness, seizures, syncope and headaches. All other systems reviewed and are negative. All Other Systems Negative  Physical Exam     Vitals:    01/24/19 0016   BP: (!) 154/94   Pulse: (!) 108   Resp: 16   Temp: 98.4 °F (36.9 °C)   SpO2: 98%   Weight: 128.8 kg (284 lb)   Height: 5' 7\" (1.702 m)     Physical Exam   Constitutional: She is oriented to person, place, and time. She appears well-developed and well-nourished. She appears distressed. Moderately distressed and anxious appearing    HENT:   Head: Normocephalic and atraumatic. Eyes: Conjunctivae are normal. Right eye exhibits no discharge. Left eye exhibits no discharge. No scleral icterus. Neck: Normal range of motion. Neck supple. Cardiovascular:   Slightly tachycardiac    Pulmonary/Chest: Effort normal. No stridor. No respiratory distress. Musculoskeletal: Normal range of motion. Neurological: She is alert and oriented to person, place, and time. Coordination normal.   Gait is steady. Able to ambulate without difficulty. Skin: Skin is warm and dry. No rash noted. She is not diaphoretic. There is erythema.    5 cm erythematous fluctuant abscess noted to the L axillary region    Psychiatric: She has a normal mood and affect. Her behavior is normal. Thought content normal.   Nursing note and vitals reviewed. Diagnostic Study Results     Labs -   No results found for this or any previous visit (from the past 12 hour(s)). Radiologic Studies -   No orders to display     CT Results  (Last 48 hours)    None        CXR Results  (Last 48 hours)    None            Medical Decision Making   I am the first provider for this patient. I reviewed the vital signs, available nursing notes, past medical history, past surgical history, family history and social history. Vital Signs-Reviewed the patient's vital signs. Records Reviewed: Miranda Walls PA-C     Procedures:  I&D Abcess Complex  Date/Time: 1/24/2019 1:23 AM  Performed by: Zora Johnson PA-C  Authorized by: Julian Lyles     Consent:     Consent obtained:  Written and verbal    Consent given by:  Patient    Risks discussed:  Bleeding, infection, incomplete drainage and pain    Alternatives discussed:  No treatment  Location:     Type:  Abscess    Size:  5 cm     Location: L axilla. Pre-procedure details:     Skin preparation:  Betadine  Anesthesia (see MAR for exact dosages): Anesthesia method:  Local infiltration    Local anesthetic:  Lidocaine 1% w/o epi (2 mL )  Procedure type:     Complexity:  Complex  Procedure details:     Incision types:  Single straight    Incision depth:  Dermal    Scalpel blade:  11    Wound management:  Irrigated with saline and extensive cleaning    Drainage:  Bloody and purulent    Drainage amount:  Copious    Wound treatment:  Wound left open    Packing materials:  None  Post-procedure details:     Patient tolerance of procedure: Tolerated with difficulty        Provider Notes (Medical Decision Making): Impression:  Abscess    I and D performed, will d/c pt with abx and pain control. PCP follow-up recommended.  Pt could not tolerate packing placement. MED RECONCILIATION:  No current facility-administered medications for this encounter. Current Outpatient Medications   Medication Sig    clindamycin (CLEOCIN) 300 mg capsule Take 1 Cap by mouth four (4) times daily for 7 days.  HYDROcodone-acetaminophen (NORCO) 5-325 mg per tablet Take 1 Tab by mouth every four (4) hours as needed for Pain. Max Daily Amount: 6 Tabs.  predniSONE (DELTASONE) 5 mg tablet Take 5 mg by mouth.  amLODIPine (NORVASC) 5 mg tablet Take 5 mg by mouth daily.  insulin lispro protamin/lispro (HUMALOG MIX 75-25 KWIKPEN SC) 50 Units by SubCUTAneous route two (2) times a day.  inhalational spacing device 1 Each by Does Not Apply route as needed. Disposition:  D/c    DISCHARGE NOTE:   Patient is stable for discharge at this time. Rx for norco and clindamycin given. Rest and follow-up with PCP this week. Return to the ED immediately for any new or worsening sx. Miranda Walls PA-C 1:26 AM   Follow-up Information     Follow up With Specialties Details Why Contact Info    Chirag Duncan MD Internal Medicine Schedule an appointment as soon as possible for a visit in 1 week  [address] [place] [address]  [phone]      [facility] EMERGENCY DEPT Emergency Medicine  As needed, If symptoms worsen [address] [phone]. [phone]          Current Discharge Medication List      START taking these medications    Details   clindamycin (CLEOCIN) 300 mg capsule Take 1 Cap by mouth four (4) times daily for 7 days. Qty: 28 Cap, Refills: 0      HYDROcodone-acetaminophen (NORCO) 5-325 mg per tablet Take 1 Tab by mouth every four (4) hours as needed for Pain. Max Daily Amount: 6 Tabs. Qty: 10 Tab, Refills: 0    Associated Diagnoses: Abscess               Diagnosis     Clinical Impression:   1.  Abscess

## 2019-01-24 NOTE — DISCHARGE INSTRUCTIONS
Patient Education        Skin Abscess: Care Instructions  Your Care Instructions    A skin abscess is a bacterial infection that forms a pocket of pus. A boil is a kind of skin abscess. The doctor may have cut an opening in the abscess so that the pus can drain out. You may have gauze in the cut so that the abscess will stay open and keep draining. You may need antibiotics. You will need to follow up with your doctor to make sure the infection has gone away. The doctor has checked you carefully, but problems can develop later. If you notice any problems or new symptoms, get medical treatment right away. Follow-up care is a key part of your treatment and safety. Be sure to make and go to all appointments, and call your doctor if you are having problems. It's also a good idea to know your test results and keep a list of the medicines you take. How can you care for yourself at home? · Apply warm and dry compresses, a heating pad set on low, or a hot water bottle 3 or 4 times a day for pain. Keep a cloth between the heat source and your skin. · If your doctor prescribed antibiotics, take them as directed. Do not stop taking them just because you feel better. You need to take the full course of antibiotics. · Take pain medicines exactly as directed. ? If the doctor gave you a prescription medicine for pain, take it as prescribed. ? If you are not taking a prescription pain medicine, ask your doctor if you can take an over-the-counter medicine. · Keep your bandage clean and dry. Change the bandage whenever it gets wet or dirty, or at least one time a day. · If the abscess was packed with gauze:  ? Keep follow-up appointments to have the gauze changed or removed. If the doctor instructed you to remove the gauze, follow the instructions you were given for how to remove it. ? After the gauze is removed, soak the area in warm water for 15 to 20 minutes 2 times a day, until the wound closes.   When should you call for help? Call your doctor now or seek immediate medical care if:    · You have signs of worsening infection, such as:  ? Increased pain, swelling, warmth, or redness. ? Red streaks leading from the infected skin. ? Pus draining from the wound. ? A fever.    Watch closely for changes in your health, and be sure to contact your doctor if:    · You do not get better as expected. Where can you learn more? Go to http://tarik-maddy.info/. Enter D948 in the search box to learn more about \"Skin Abscess: Care Instructions. \"  Current as of: 2018  Content Version: 11.9  © 4427-2973 [employer]. Care instructions adapted under license by [employer] (which disclaims liability or warranty for this information). If you have questions about a medical condition or this instruction, always ask your healthcare professional. [place] [address] any warranty or liability for your use of this information. [employer] Activation    Thank you for requesting access to [employer]. Please follow the instructions below to securely access and download your online medical record. [employer] allows you to send messages to your doctor, view your test results, renew your prescriptions, schedule appointments, and more. How Do I Sign Up? 1. In your internet browser, go to [email or web address]  2. Click on the First Time User? Click Here link in the Sign In box. You will be redirect to the New Member Sign Up page. 3. Enter your [employer] Access Code exactly as it appears below. You will not need to use this code after youve completed the sign-up process. If you do not sign up before the expiration date, you must request a new code. [employer]Vid Access Code: [email or web address]  Expires: 2019  8:30 AM (This is the date your Sl[employer]id access code will )    4.  Enter the last four digits of your Social Security Number (xxxx) and Date of Birth (mm/dd/yyyy) as indicated and click Submit. You will be taken to the next sign-up page. 5. Create a [employer] ID. This will be your [employer] login ID and cannot be changed, so think of one that is secure and easy to remember. 6. Create a [employer] password. You can change your password at any time. 7. Enter your Password Reset Question and Answer. This can be used at a later time if you forget your password. 8. Enter your e-mail address. You will receive e-mail notification when new information is available in [address] 9. Click Sign Up. You can now view and download portions of your medical record. 10. Click the Download Summary menu link to download a portable copy of your medical information. Additional Information    If you have questions, please visit the Frequently Asked Questions section of the [employer] website at [email or web address] Remember, [employer] is NOT to be used for urgent needs. For medical emergencies, dial 911. Complete all medications as prescribed. Follow-up with primary care doctor in 1 week. Return to the ED immediately for any new or worsening symptoms.

## 2019-04-24 ENCOUNTER — APPOINTMENT (OUTPATIENT)
Dept: CT IMAGING | Age: 64
End: 2019-04-24
Attending: PHYSICIAN ASSISTANT
Payer: MEDICARE

## 2019-04-24 ENCOUNTER — HOSPITAL ENCOUNTER (EMERGENCY)
Age: 64
Discharge: HOME | End: 2019-04-24
Attending: EMERGENCY MEDICINE | Admitting: EMERGENCY MEDICINE
Payer: MEDICARE

## 2019-04-24 ENCOUNTER — APPOINTMENT (OUTPATIENT)
Dept: GENERAL RADIOLOGY | Age: 64
End: 2019-04-24
Attending: PHYSICIAN ASSISTANT
Payer: MEDICARE

## 2019-04-24 VITALS
TEMPERATURE: 98 F | SYSTOLIC BLOOD PRESSURE: 177 MMHG | HEART RATE: 97 BPM | DIASTOLIC BLOOD PRESSURE: 72 MMHG | BODY MASS INDEX: 31.08 KG/M2 | HEIGHT: 67 IN | OXYGEN SATURATION: 99 % | RESPIRATION RATE: 21 BRPM | WEIGHT: 198 LBS

## 2019-04-24 DIAGNOSIS — R07.81 RIB PAIN ON RIGHT SIDE: Primary | ICD-10-CM

## 2019-04-24 LAB
ALBUMIN SERPL-MCNC: 3.5 G/DL (ref 3.4–5)
ALBUMIN/GLOB SERPL: 0.8 {RATIO} (ref 0.8–1.7)
ALP SERPL-CCNC: 163 U/L (ref 45–117)
ALT SERPL-CCNC: 19 U/L (ref 13–56)
ANION GAP SERPL CALC-SCNC: 9 MMOL/L (ref 3–18)
AST SERPL-CCNC: 13 U/L (ref 15–37)
BASOPHILS # BLD: 0 K/UL (ref 0–0.1)
BASOPHILS NFR BLD: 0 % (ref 0–2)
BILIRUB SERPL-MCNC: 0.3 MG/DL (ref 0.2–1)
BUN SERPL-MCNC: 10 MG/DL (ref 7–18)
BUN/CREAT SERPL: 11 (ref 12–20)
CALCIUM SERPL-MCNC: 9.4 MG/DL (ref 8.5–10.1)
CHLORIDE SERPL-SCNC: 104 MMOL/L (ref 100–108)
CO2 SERPL-SCNC: 26 MMOL/L (ref 21–32)
CREAT SERPL-MCNC: 0.94 MG/DL (ref 0.6–1.3)
D DIMER PPP FEU-MCNC: 1.36 UG/ML(FEU)
DIFFERENTIAL METHOD BLD: ABNORMAL
EOSINOPHIL # BLD: 0.2 K/UL (ref 0–0.4)
EOSINOPHIL NFR BLD: 2 % (ref 0–5)
ERYTHROCYTE [DISTWIDTH] IN BLOOD BY AUTOMATED COUNT: 13.1 % (ref 11.6–14.5)
GLOBULIN SER CALC-MCNC: 4.4 G/DL (ref 2–4)
GLUCOSE SERPL-MCNC: 180 MG/DL (ref 74–99)
HCT VFR BLD AUTO: 35.5 % (ref 35–45)
HGB BLD-MCNC: 11.8 G/DL (ref 12–16)
LYMPHOCYTES # BLD: 1.1 K/UL (ref 0.9–3.6)
LYMPHOCYTES NFR BLD: 14 % (ref 21–52)
MCH RBC QN AUTO: 27.1 PG (ref 24–34)
MCHC RBC AUTO-ENTMCNC: 33.2 G/DL (ref 31–37)
MCV RBC AUTO: 81.6 FL (ref 74–97)
MONOCYTES # BLD: 0.6 K/UL (ref 0.05–1.2)
MONOCYTES NFR BLD: 7 % (ref 3–10)
NEUTS SEG # BLD: 5.8 K/UL (ref 1.8–8)
NEUTS SEG NFR BLD: 77 % (ref 40–73)
PLATELET # BLD AUTO: 262 K/UL (ref 135–420)
PMV BLD AUTO: 10.2 FL (ref 9.2–11.8)
POTASSIUM SERPL-SCNC: 3.6 MMOL/L (ref 3.5–5.5)
PROT SERPL-MCNC: 7.9 G/DL (ref 6.4–8.2)
RBC # BLD AUTO: 4.35 M/UL (ref 4.2–5.3)
SODIUM SERPL-SCNC: 139 MMOL/L (ref 136–145)
TROPONIN I SERPL-MCNC: <0.02 NG/ML (ref 0–0.04)
WBC # BLD AUTO: 7.6 K/UL (ref 4.6–13.2)

## 2019-04-24 PROCEDURE — 74011250636 HC RX REV CODE- 250/636: Performed by: PHYSICIAN ASSISTANT

## 2019-04-24 PROCEDURE — 96360 HYDRATION IV INFUSION INIT: CPT

## 2019-04-24 PROCEDURE — 74011000258 HC RX REV CODE- 258: Performed by: EMERGENCY MEDICINE

## 2019-04-24 PROCEDURE — 74011000250 HC RX REV CODE- 250: Performed by: PHYSICIAN ASSISTANT

## 2019-04-24 PROCEDURE — 99285 EMERGENCY DEPT VISIT HI MDM: CPT

## 2019-04-24 PROCEDURE — 85025 COMPLETE CBC W/AUTO DIFF WBC: CPT

## 2019-04-24 PROCEDURE — 71275 CT ANGIOGRAPHY CHEST: CPT

## 2019-04-24 PROCEDURE — 74011636320 HC RX REV CODE- 636/320: Performed by: EMERGENCY MEDICINE

## 2019-04-24 PROCEDURE — 93005 ELECTROCARDIOGRAM TRACING: CPT

## 2019-04-24 PROCEDURE — 80053 COMPREHEN METABOLIC PANEL: CPT

## 2019-04-24 PROCEDURE — 84484 ASSAY OF TROPONIN QUANT: CPT

## 2019-04-24 PROCEDURE — 71045 X-RAY EXAM CHEST 1 VIEW: CPT

## 2019-04-24 PROCEDURE — 85379 FIBRIN DEGRADATION QUANT: CPT

## 2019-04-24 RX ORDER — HYDROCODONE BITARTRATE AND ACETAMINOPHEN 5; 325 MG/1; MG/1
1 TABLET ORAL
Qty: 15 TAB | Refills: 0 | Status: SHIPPED | OUTPATIENT
Start: 2019-04-24 | End: 2019-04-27

## 2019-04-24 RX ORDER — LIDOCAINE 4 G/100G
1 PATCH TOPICAL EVERY 24 HOURS
Status: DISCONTINUED | OUTPATIENT
Start: 2019-04-24 | End: 2019-04-24 | Stop reason: HOSPADM

## 2019-04-24 RX ORDER — SODIUM CHLORIDE 9 MG/ML
50 INJECTION, SOLUTION INTRAVENOUS ONCE
Status: COMPLETED | OUTPATIENT
Start: 2019-04-24 | End: 2019-04-24

## 2019-04-24 RX ADMIN — IOPAMIDOL 79 ML: 755 INJECTION, SOLUTION INTRAVENOUS at 18:07

## 2019-04-24 RX ADMIN — SODIUM CHLORIDE 46 ML: 900 INJECTION, SOLUTION INTRAVENOUS at 18:06

## 2019-04-24 RX ADMIN — SODIUM CHLORIDE 500 ML: 900 INJECTION, SOLUTION INTRAVENOUS at 16:31

## 2019-04-24 NOTE — ED PROVIDER NOTES
EMERGENCY DEPARTMENT HISTORY AND PHYSICAL EXAM    Date: 4/24/2019  Patient Name: Fartun Chavarria    History of Presenting Illness     Chief Complaint   Patient presents with    Other         History Provided By: Patient    Chief Complaint: right sided rib pain  Duration: 2 Days  Timing:  Gradual and Worsening  Location: right sided rib  Quality: Sharp, Pressure and Tightness  Severity: Severe  Modifying Factors: worse w/ movement and inspiration  Associated Symptoms: productive cough      HPI: Fartun Chavarria is a 75 y.o. female with a PMH of Lung CA, HTN, DM who presents to the ER complaining of right-sided rib pain and cough. Patient states her symptoms began several days ago and significantly worsened today. She previously underwent radiation and chemotherapy treatment for right-sided lung cancer last fall. She currently follows up with Dr. Anthony Valiente as her pulmonologist.  She denied any shortness of breath or fevers. Sometimes her cough is productive with yellow-colored phlegm. Patient states the right-sided pain is worse when she stands or moves or takes deep breaths. She denied any recent injuries or falls. She denied any chest pain, headaches, dizziness, abdominal pain, nausea, vomiting and has no other symptoms or complaints. PCP: Jillian Platt MD    Current Facility-Administered Medications   Medication Dose Route Frequency Provider Last Rate Last Dose    lidocaine 4 % patch 1 Patch  1 Patch TransDERmal Q24H Suzy Hines, ZAMZAM   1 Patch at 04/24/19 1630     Current Outpatient Medications   Medication Sig Dispense Refill    HYDROcodone-acetaminophen (NORCO) 5-325 mg per tablet Take 1 Tab by mouth every six (6) hours as needed for Pain for up to 3 days. Max Daily Amount: 4 Tabs. 15 Tab 0    predniSONE (DELTASONE) 5 mg tablet Take 5 mg by mouth.  inhalational spacing device 1 Each by Does Not Apply route as needed. 1 Device 0    amLODIPine (NORVASC) 5 mg tablet Take 5 mg by mouth daily.       insulin lispro protamin/lispro (HUMALOG MIX 75-25 KWIKPEN SC) 50 Units by SubCUTAneous route two (2) times a day. Past History     Past Medical History:  Past Medical History:   Diagnosis Date    Diabetes mellitus ([address]     Hypertension        Past Surgical History:  Past Surgical History:   Procedure Laterality Date    ABDOMEN SURGERY PROC UNLISTED      HX APPENDECTOMY      HX TONSILLECTOMY         Family History:  History reviewed. No pertinent family history. Social History:  Social History     Tobacco Use    Smoking status: Former Smoker     Years: 24.00     Last attempt to quit: 1978     Years since quittin.8    Smokeless tobacco: Never Used   Substance Use Topics    Alcohol use: No    Drug use: No       Allergies:  No Known Allergies      Review of Systems   Review of Systems   Constitutional: Negative for chills, fatigue and fever. HENT: Negative. Negative for sore throat. Eyes: Negative. Respiratory: Positive for cough. Negative for shortness of breath. Cardiovascular: Negative for chest pain and palpitations. Right sided rib pain   Gastrointestinal: Negative for abdominal pain, nausea and vomiting. Genitourinary: Negative for dysuria. Musculoskeletal: Negative. Skin: Negative. Neurological: Negative for dizziness, weakness, light-headedness and headaches. Psychiatric/Behavioral: Negative. All other systems reviewed and are negative. Physical Exam     Vitals:    19 1645 19 1700 19 1730 19 1745   BP: (!) 154/125 (!) 139/114 155/70 142/80   Pulse: 95 97 93 91   Resp: 23 25 (!) 31 26   Temp:       SpO2: 97% 98% 99% 98%   Weight:       Height:         Physical Exam   Constitutional: She is oriented to person, place, and time. She appears well-developed and well-nourished. No distress. HENT:   Head: Normocephalic and atraumatic.    Mouth/Throat: Oropharynx is clear and moist.   Eyes: Conjunctivae are normal. No scleral icterus. Neck: Neck supple. No JVD present. No tracheal deviation present. Cardiovascular: Regular rhythm and normal heart sounds. Tachycardia present. Pulmonary/Chest: Effort normal and breath sounds normal. No respiratory distress. She has no wheezes. She has no rales. She exhibits tenderness. Right breast exhibits no inverted nipple. Left breast exhibits no inverted nipple. Abdominal: Soft. Bowel sounds are normal. She exhibits no distension. There is no tenderness. There is no rebound and no guarding. Musculoskeletal: Normal range of motion. Neurological: She is alert and oriented to person, place, and time. She has normal strength. Gait normal. GCS eye subscore is 4. GCS verbal subscore is 5. GCS motor subscore is 6. Skin: Skin is warm and dry. She is not diaphoretic. Psychiatric: She has a normal mood and affect. Nursing note and vitals reviewed. Diagnostic Study Results     Labs -     Recent Results (from the past 12 hour(s))   CBC WITH AUTOMATED DIFF    Collection Time: 04/24/19  3:28 PM   Result Value Ref Range    WBC 7.6 4.6 - 13.2 K/uL    RBC 4.35 4.20 - 5.30 M/uL    HGB 11.8 (L) 12.0 - 16.0 g/dL    HCT 35.5 35.0 - 45.0 %    MCV 81.6 74.0 - 97.0 FL    MCH 27.1 24.0 - 34.0 PG    MCHC 33.2 31.0 - 37.0 g/dL    RDW 13.1 11.6 - 14.5 %    PLATELET 521 897 - 552 K/uL    MPV 10.2 9.2 - 11.8 FL    NEUTROPHILS 77 (H) 40 - 73 %    LYMPHOCYTES 14 (L) 21 - 52 %    MONOCYTES 7 3 - 10 %    EOSINOPHILS 2 0 - 5 %    BASOPHILS 0 0 - 2 %    ABS. NEUTROPHILS 5.8 1.8 - 8.0 K/UL    ABS. LYMPHOCYTES 1.1 0.9 - 3.6 K/UL    ABS. MONOCYTES 0.6 0.05 - 1.2 K/UL    ABS. EOSINOPHILS 0.2 0.0 - 0.4 K/UL    ABS.  BASOPHILS 0.0 0.0 - 0.1 K/UL    DF AUTOMATED     METABOLIC PANEL, COMPREHENSIVE    Collection Time: 04/24/19  3:28 PM   Result Value Ref Range    Sodium 139 136 - 145 mmol/L    Potassium 3.6 3.5 - 5.5 mmol/L    Chloride 104 100 - 108 mmol/L    CO2 26 21 - 32 mmol/L    Anion gap 9 3.0 - 18 mmol/L    Glucose 180 (H) 74 - 99 mg/dL    BUN 10 7.0 - 18 MG/DL    Creatinine 0.94 0.6 - 1.3 MG/DL    BUN/Creatinine ratio 11 (L) 12 - 20      GFR est AA >60 >60 ml/min/1.73m2    GFR est non-AA 60 (L) >60 ml/min/1.73m2    Calcium 9.4 8.5 - 10.1 MG/DL    Bilirubin, total 0.3 0.2 - 1.0 MG/DL    ALT (SGPT) 19 13 - 56 U/L    AST (SGOT) 13 (L) 15 - 37 U/L    Alk. phosphatase 163 (H) 45 - 117 U/L    Protein, total 7.9 6.4 - 8.2 g/dL    Albumin 3.5 3.4 - 5.0 g/dL    Globulin 4.4 (H) 2.0 - 4.0 g/dL    A-G Ratio 0.8 0.8 - 1.7     TROPONIN I    Collection Time: 04/24/19  3:28 PM   Result Value Ref Range    Troponin-I, QT <0.02 0.0 - 0.045 NG/ML   D DIMER    Collection Time: 04/24/19  3:28 PM   Result Value Ref Range    D DIMER 1.36 (H) <0.46 ug/ml(FEU)   EKG, 12 LEAD, INITIAL    Collection Time: 04/24/19  4:03 PM   Result Value Ref Range    Ventricular Rate 97 BPM    Atrial Rate 97 BPM    P-R Interval 146 ms    QRS Duration 78 ms    Q-T Interval 372 ms    QTC Calculation (Bezet) 472 ms    Calculated P Axis 52 degrees    Calculated R Axis 15 degrees    Calculated T Axis 49 degrees    Diagnosis       Poor data quality, interpretation may be adversely affected  Normal sinus rhythm  Right atrial enlargement  Minimal voltage criteria for LVH, may be normal variant  Cannot rule out Anterior infarct , age undetermined  Abnormal ECG  When compared with ECG of 30-DEC-2018 09:18,  premature atrial complexes are no longer present         Radiologic Studies -   XR CHEST PORT   Final Result   IMPRESSION:      No significant interval change given differences in technique. Continued   ill-defined right hilar and lower lobe airspace process, which is nonspecific   but given history may represent sequela of prior right lung cancer with   radiation therapy. Superimposed pneumonia is possible, although no definite   change is seen.          CTA CHEST W OR W WO CONT    (Results Pending)     CT Results  (Last 48 hours)    None        CXR Results (Last 48 hours)               04/24/19 1633  XR CHEST PORT Final result    Impression:  IMPRESSION:       No significant interval change given differences in technique. Continued   ill-defined right hilar and lower lobe airspace process, which is nonspecific   but given history may represent sequela of prior right lung cancer with   radiation therapy. Superimposed pneumonia is possible, although no definite   change is seen. Narrative:  Portable Chest         History: right rib pain, cough, right lung cancer       Comparison: PA and lateral chest 12/30/2018       Portable view of the chest demonstrates stable cardiomediastinal silhouette. Hypoinflation is again seen. Right-sided Mediport is again noted. Ill-defined   alveolar and interstitial opacities are again seen in the right perihilar region   extending into the right lower lobe with denser consolidation. Left lung appears   clear. Minimal degenerative changes are in the spine. Medical Decision Making   I am the first provider for this patient. I reviewed the vital signs, available nursing notes, past medical history, past surgical history, family history and social history. Vital Signs-Reviewed the patient's vital signs. Records Reviewed: Nursing Notes, Old Medical Records, Previous electrocardiograms, Previous Radiology Studies and Previous Laboratory Studies     327 PM  66-year-old female who presents to the ER complaining of right-sided rib pain, worse with movement and inspiration. Patient reports history of right-sided lung cancer in remission. Last chemo and radiation was in the fall 2018. She currently follows with Dr. Elizabeth Jay, pulmonologist.  Also reports associated cough with occasional mucus production. Denies any recent fevers or shortness of breath. Is very tender on exam to palpation to the right rib cage.   No signs of sepsis on exam.  We will plan on labs, imaging and medications for symptom relief at this time. Mynor Monte PA-C     6:42 PM  CTA of the chest with no acute findings for pulmonary embolism. Preliminary read shows interval progression of right perihilar, middle and lower lobe interstitial and airspace process interval progression of pleural thickening and new small pleural effusion. This is concerning for progressive disease however cannot exclude superimposed pneumonia. Patient has no signs or symptoms of pneumonia; afebrile, normal vital signs and otherwise well-appearing. Discussed all results with patient. We will have her follow-up with her oncologist and primary care tomorrow as directed. All questions answered and patient in agreement with plan of care. Will plan for discharge. Mynor Monte PA-C    Disposition:  discharged    DISCHARGE NOTE:       Care plan outlined and precautions discussed. Patient has no new complaints, changes, or physical findings. Results of labs, imaging were reviewed with the patient. All medications were reviewed with the patient; will d/c home with norco. All of pt's questions and concerns were addressed. Patient was instructed and agrees to follow up with pcp and oncology, as well as to return to the ED upon further deterioration. Patient is ready to go home. Follow-up Information     Follow up With Specialties Details Why [address] [facility] EMERGENCY DEPT Emergency Medicine  If symptoms worsen [address]    Venkatesh Shankar MD Oncology Call in 1 day Oncology follow up for right sided pain, lung cancer [address] [facility]  [phone]            Current Discharge Medication List      CONTINUE these medications which have CHANGED    Details   HYDROcodone-acetaminophen (NORCO) 5-325 mg per tablet Take 1 Tab by mouth every six (6) hours as needed for Pain for up to 3 days. Max Daily Amount: 4 Tabs.   Qty: 15 Tab, Refills: 0    Associated Diagnoses: Rib pain on right side         CONTINUE these medications which have NOT CHANGED    Details   predniSONE (DELTASONE) 5 mg tablet Take 5 mg by mouth. inhalational spacing device 1 Each by Does Not Apply route as needed. Qty: 1 Device, Refills: 0      amLODIPine (NORVASC) 5 mg tablet Take 5 mg by mouth daily. insulin lispro protamin/lispro (HUMALOG MIX 75-25 KWIKPEN SC) 50 Units by SubCUTAneous route two (2) times a day. Provider Notes (Medical Decision Making):     Procedures:  Procedures        Diagnosis     Clinical Impression:   1.  Rib pain on right side

## 2019-04-24 NOTE — ED NOTES
Patient stated understanding of discharge instructions. Patient received one prescription(s) Patient told not to drive with medication. Patient was ambulatory upon discharge. Patient was in stable condition.    Patient armband removed and shredded

## 2019-04-24 NOTE — DISCHARGE INSTRUCTIONS

## 2019-04-24 NOTE — ED NOTES
I performed a brief evaluation, including history and physical, of the patient here in triage and I have determined that pt will need further treatment and evaluation from the main side ER physician. I have placed initial orders to help in expediting patients care. April 24, 2019 at 3:13 PM - SAVANNAH Rueda There were no vitals taken for this visit.

## 2019-04-25 LAB
ATRIAL RATE: 98 BPM
CALCULATED P AXIS, ECG09: 50 DEGREES
CALCULATED R AXIS, ECG10: 15 DEGREES
CALCULATED T AXIS, ECG11: 50 DEGREES
DIAGNOSIS, 93000: NORMAL
P-R INTERVAL, ECG05: 148 MS
Q-T INTERVAL, ECG07: 362 MS
QRS DURATION, ECG06: 76 MS
QTC CALCULATION (BEZET), ECG08: 462 MS
VENTRICULAR RATE, ECG03: 98 BPM

## 2019-05-20 ENCOUNTER — HOSPITAL ENCOUNTER (EMERGENCY)
Age: 64
Discharge: HOME | End: 2019-05-20
Attending: EMERGENCY MEDICINE | Admitting: EMERGENCY MEDICINE
Payer: MEDICARE

## 2019-05-20 VITALS
SYSTOLIC BLOOD PRESSURE: 180 MMHG | RESPIRATION RATE: 16 BRPM | TEMPERATURE: 98.6 F | OXYGEN SATURATION: 96 % | BODY MASS INDEX: 31.01 KG/M2 | HEART RATE: 87 BPM | HEIGHT: 67 IN | DIASTOLIC BLOOD PRESSURE: 88 MMHG

## 2019-05-20 DIAGNOSIS — L02.91 ABSCESS: Primary | ICD-10-CM

## 2019-05-20 PROCEDURE — 77030019895 HC PCKNG STRP IODO -A

## 2019-05-20 PROCEDURE — 99282 EMERGENCY DEPT VISIT SF MDM: CPT

## 2019-05-20 PROCEDURE — 75810000289 HC I&D ABSCESS SIMP/COMP/MULT

## 2019-05-20 PROCEDURE — 74011000250 HC RX REV CODE- 250: Performed by: EMERGENCY MEDICINE

## 2019-05-20 RX ORDER — LIDOCAINE HYDROCHLORIDE AND EPINEPHRINE 20; 10 MG/ML; UG/ML
10 INJECTION, SOLUTION INFILTRATION; PERINEURAL ONCE
Status: COMPLETED | OUTPATIENT
Start: 2019-05-20 | End: 2019-05-20

## 2019-05-20 RX ORDER — DOXYCYCLINE 100 MG/1
100 CAPSULE ORAL 2 TIMES DAILY
Qty: 14 CAP | Refills: 0 | Status: SHIPPED | OUTPATIENT
Start: 2019-05-20 | End: 2019-05-27

## 2019-05-20 RX ADMIN — LIDOCAINE HYDROCHLORIDE,EPINEPHRINE BITARTRATE 200 MG: 20; .01 INJECTION, SOLUTION INFILTRATION; PERINEURAL at 07:26

## 2019-05-20 NOTE — ED PROVIDER NOTES
EMERGENCY DEPARTMENT HISTORY AND PHYSICAL EXAM    7:37 AM      Date: 2019  Patient Name: Aisha Mansfield    History of Presenting Illness     Chief Complaint   Patient presents with    Abscess         History Provided By: Patient      Additional History (Context): Aisha Mansfield is a 75 y.o. female with PMH of diabetes presents with left under arm abscess x2 days. Patient has a history of frequent abscesses in the under arm area. She has had no nausea vomiting fever or chills. She feels otherwise well and has not had any drainage from the area as of yet. PCP: Maxime Guthrie MD          Current Outpatient Medications   Medication Sig Dispense Refill    doxycycline (MONODOX) 100 mg capsule Take 1 Cap by mouth two (2) times a day for 7 days. 14 Cap 0    predniSONE (DELTASONE) 5 mg tablet Take 5 mg by mouth.  inhalational spacing device 1 Each by Does Not Apply route as needed. 1 Device 0    amLODIPine (NORVASC) 5 mg tablet Take 5 mg by mouth daily.  insulin lispro protamin/lispro (HUMALOG MIX 75-25 KWIKPEN SC) 50 Units by SubCUTAneous route two (2) times a day. Past History     Past Medical History:  Past Medical History:   Diagnosis Date    COPD (chronic obstructive pulmonary disease) ([facility]     Diabetes mellitus ([facility]     Emphysema of lung ([facility] [address]     Hypertension        Past Surgical History:  Past Surgical History:   Procedure Laterality Date    ABDOMEN SURGERY PROC UNLISTED      HX APPENDECTOMY      HX TONSILLECTOMY         Family History:  History reviewed. No pertinent family history. Social History:  Social History     Tobacco Use    Smoking status: Former Smoker     Years: 24.00     Last attempt to quit: 1978     Years since quittin.8    Smokeless tobacco: Never Used   Substance Use Topics    Alcohol use: No    Drug use: No       Allergies:  No Known Allergies      Review of Systems       Review of Systems   Constitutional: Negative for chills and fever. Respiratory: Negative for shortness of breath. Cardiovascular: Negative for chest pain. Gastrointestinal: Negative for nausea and vomiting. All other systems reviewed and are negative. Physical Exam     Visit Vitals  /88 (BP 1 Location: Right arm, BP Patient Position: At rest)   Pulse 87   Temp 98.6 °F (37 °C)   Resp 16   Ht 5' 7\" (1.702 m)   SpO2 96%   BMI 31.01 kg/m²       Physical Exam   Constitutional: She is oriented to person, place, and time. She appears well-developed and well-nourished. No distress. HENT:   Head: Normocephalic and atraumatic. Eyes: Conjunctivae and EOM are normal. Right eye exhibits no discharge. Left eye exhibits no discharge. No scleral icterus. Neck: Normal range of motion. Neck supple. No tracheal deviation present. Cardiovascular: Normal rate, regular rhythm and normal heart sounds. No murmur heard. Pulmonary/Chest: Effort normal and breath sounds normal. No respiratory distress. She has no wheezes. She has no rales. Abdominal: Soft. She exhibits no distension. There is no tenderness. There is no rebound and no guarding. Musculoskeletal: Normal range of motion. She exhibits no edema or deformity. Neurological: She is alert and oriented to person, place, and time. No cranial nerve deficit. Skin: Skin is warm and dry. She is not diaphoretic.   5 x 5 cm area of erythema and induration in the inferior portion of the right under arm   Psychiatric: She has a normal mood and affect. Her behavior is normal. Judgment and thought content normal.           Diagnostic Study Results     Labs -  No results found for this or any previous visit (from the past 12 hour(s)). Radiologic Studies -   No orders to display         Medical Decision Making   I am the first provider for this patient. I reviewed the vital signs, available nursing notes, past medical history, past surgical history, family history and social history.     Vital Signs-Reviewed the patient's vital signs. I&D Niecy Simple  Date/Time: 5/20/2019 7:59 AM  Performed by: Tressa Severe, MD  Authorized by: Tressa Severe, MD     Consent:     Consent obtained:  Verbal    Consent given by:  Patient    Risks discussed:  Bleeding and pain    Alternatives discussed:  No treatment  Location:     Type:  Abscess    Location:  Upper extremity    Upper extremity location:  Arm    Arm location:  R upper arm  Pre-procedure details:     Skin preparation:  Antiseptic wash  Anesthesia (see MAR for exact dosages): Anesthesia method:  Local infiltration    Local anesthetic:  Lidocaine 2% WITH epi  Procedure type:     Complexity:  Simple  Procedure details:     Needle aspiration: no      Incision types:  Single straight    Incision depth:  Submucosal    Scalpel blade:  15    Wound management:  Probed and deloculated    Drainage:  Purulent and bloody    Drainage amount:  Copious    Wound treatment:  Drain placed    Packing materials:  1/2 in iodoform gauze  Post-procedure details:     Patient tolerance of procedure: Tolerated well, no immediate complications            Provider Notes (Medical Decision Making): After history and physical exam significant patient appears to have abscess with no obvious cellulitis. Pain is not out of proportion to the physical findings and they appear otherwise well without obvious other injury or systemic complaints. I&D performed without complications. Patient is a good candidate for outpatient oral antibiotics based on their history, physical exam, and presenting complaints. Patient was discharged on oral antibiotics. I have advised the pt to return to the emergency department for fevers, weakness, or significant pain. Diagnosis     Clinical Impression:   1.  Abscess        Disposition: discharge    Follow-up Information     Follow up With Specialties Details Why [facility] EMERGENCY DEPT Emergency Medicine  If symptoms worsen [address] [place] [address]    Frutoso Organ, DO General Surgery Schedule an appointment as soon as possible for a visit  [address] [facility] [address]  [address]   [phone]             Patient's Medications   Start Taking    DOXYCYCLINE (MONODOX) 100 MG CAPSULE    Take 1 Cap by mouth two (2) times a day for 7 days. Continue Taking    AMLODIPINE (NORVASC) 5 MG TABLET    Take 5 mg by mouth daily. INHALATIONAL SPACING DEVICE    1 Each by Does Not Apply route as needed. INSULIN LISPRO PROTAMIN/LISPRO (HUMALOG MIX 75-25 KWIKPEN SC)    50 Units by SubCUTAneous route two (2) times a day. PREDNISONE (DELTASONE) 5 MG TABLET    Take 5 mg by mouth.    These Medications have changed    No medications on file   Stop Taking    No medications on file     _______________________________  Moraima Strong MD  _______________________________

## 2019-05-20 NOTE — ED NOTES
I have reviewed discharge instructions with the patient. The patient verbalized understanding. Patient armband removed and given to patient to take home. Patient was informed of the privacy risks if armband lost or stolen. Pt alert, oriented x4 and ambulatory out of ER in [facility] at this time. VSS.

## 2019-05-20 NOTE — ED TRIAGE NOTES
Pt ambulatory into triage for c/o abscess in her right axilla that she states has been there for 2 days. Pt states she has not attempted to drain it.

## 2019-05-20 NOTE — DISCHARGE INSTRUCTIONS

## 2019-11-26 ENCOUNTER — APPOINTMENT (OUTPATIENT)
Dept: CT IMAGING | Age: 64
DRG: 280 | End: 2019-11-26
Attending: EMERGENCY MEDICINE
Payer: MEDICARE

## 2019-11-26 ENCOUNTER — HOSPITAL ENCOUNTER (INPATIENT)
Age: 64
LOS: 3 days | Discharge: HOME | DRG: 280 | End: 2019-11-29
Attending: EMERGENCY MEDICINE | Admitting: INTERNAL MEDICINE
Payer: MEDICARE

## 2019-11-26 ENCOUNTER — APPOINTMENT (OUTPATIENT)
Dept: VASCULAR SURGERY | Age: 64
DRG: 280 | End: 2019-11-26
Attending: EMERGENCY MEDICINE
Payer: MEDICARE

## 2019-11-26 DIAGNOSIS — R06.02 SOB (SHORTNESS OF BREATH): ICD-10-CM

## 2019-11-26 DIAGNOSIS — Z85.118 HISTORY OF LUNG CANCER: ICD-10-CM

## 2019-11-26 DIAGNOSIS — I21.4 NON-STEMI (NON-ST ELEVATED MYOCARDIAL INFARCTION) (HCC): Primary | ICD-10-CM

## 2019-11-26 DIAGNOSIS — Z87.09 HISTORY OF COPD: ICD-10-CM

## 2019-11-26 DIAGNOSIS — R60.0 BILATERAL LOWER EXTREMITY EDEMA: ICD-10-CM

## 2019-11-26 PROBLEM — I82.90: Status: ACTIVE | Noted: 2019-11-26

## 2019-11-26 PROBLEM — J44.9 COPD (CHRONIC OBSTRUCTIVE PULMONARY DISEASE) (HCC): Status: ACTIVE | Noted: 2019-11-26

## 2019-11-26 PROBLEM — E11.9 DIABETES MELLITUS, TYPE 2 (HCC): Status: ACTIVE | Noted: 2019-11-26

## 2019-11-26 PROBLEM — I70.90 ARTERIAL OCCLUSION: Status: ACTIVE | Noted: 2019-11-26

## 2019-11-26 PROBLEM — J18.9 PNEUMONITIS: Status: ACTIVE | Noted: 2019-11-26

## 2019-11-26 PROBLEM — I10 HTN (HYPERTENSION): Status: ACTIVE | Noted: 2019-11-26

## 2019-11-26 PROBLEM — I70.90 OCCLUSION OF ARTERY: Status: ACTIVE | Noted: 2019-11-26

## 2019-11-26 LAB
ALBUMIN SERPL-MCNC: 2.6 G/DL (ref 3.4–5)
ALBUMIN/GLOB SERPL: 0.6 {RATIO} (ref 0.8–1.7)
ALP SERPL-CCNC: 155 U/L (ref 45–117)
ALT SERPL-CCNC: 23 U/L (ref 13–56)
ANION GAP SERPL CALC-SCNC: 6 MMOL/L (ref 3–18)
AST SERPL-CCNC: 17 U/L (ref 10–38)
BASOPHILS # BLD: 0 K/UL (ref 0–0.1)
BASOPHILS NFR BLD: 0 % (ref 0–2)
BILIRUB DIRECT SERPL-MCNC: <0.1 MG/DL (ref 0–0.2)
BILIRUB SERPL-MCNC: 0.2 MG/DL (ref 0.2–1)
BILIRUB SERPL-MCNC: 0.2 MG/DL (ref 0.2–1)
BUN SERPL-MCNC: 15 MG/DL (ref 7–18)
BUN/CREAT SERPL: 17 (ref 12–20)
CALCIUM SERPL-MCNC: 9.2 MG/DL (ref 8.5–10.1)
CHLORIDE SERPL-SCNC: 102 MMOL/L (ref 100–111)
CK MB CFR SERPL CALC: 2.4 % (ref 0–4)
CK MB SERPL-MCNC: 1.3 NG/ML (ref 5–25)
CK SERPL-CCNC: 55 U/L (ref 26–192)
CO2 SERPL-SCNC: 27 MMOL/L (ref 21–32)
CREAT SERPL-MCNC: 0.87 MG/DL (ref 0.6–1.3)
DIFFERENTIAL METHOD BLD: ABNORMAL
EOSINOPHIL # BLD: 0.1 K/UL (ref 0–0.4)
EOSINOPHIL NFR BLD: 1 % (ref 0–5)
ERYTHROCYTE [DISTWIDTH] IN BLOOD BY AUTOMATED COUNT: 13.3 % (ref 11.6–14.5)
GLOBULIN SER CALC-MCNC: 4.7 G/DL (ref 2–4)
GLUCOSE BLD STRIP.AUTO-MCNC: 253 MG/DL (ref 70–110)
GLUCOSE SERPL-MCNC: 314 MG/DL (ref 74–99)
HCT VFR BLD AUTO: 36.6 % (ref 35–45)
HGB BLD-MCNC: 11.8 G/DL (ref 12–16)
INR PPP: 1 (ref 0.8–1.2)
LYMPHOCYTES # BLD: 1 K/UL (ref 0.9–3.6)
LYMPHOCYTES NFR BLD: 10 % (ref 21–52)
MCH RBC QN AUTO: 27.9 PG (ref 24–34)
MCHC RBC AUTO-ENTMCNC: 32.2 G/DL (ref 31–37)
MCV RBC AUTO: 86.5 FL (ref 74–97)
MONOCYTES # BLD: 0.6 K/UL (ref 0.05–1.2)
MONOCYTES NFR BLD: 6 % (ref 3–10)
NEUTS SEG # BLD: 8.3 K/UL (ref 1.8–8)
NEUTS SEG NFR BLD: 83 % (ref 40–73)
PLATELET # BLD AUTO: 396 K/UL (ref 135–420)
PMV BLD AUTO: 11 FL (ref 9.2–11.8)
POTASSIUM SERPL-SCNC: 4.3 MMOL/L (ref 3.5–5.5)
PROT SERPL-MCNC: 7.3 G/DL (ref 6.4–8.2)
PROTHROMBIN TIME: 12.6 SEC (ref 11.5–15.2)
RBC # BLD AUTO: 4.23 M/UL (ref 4.2–5.3)
SODIUM SERPL-SCNC: 135 MMOL/L (ref 136–145)
TROPONIN I SERPL-MCNC: 2.67 NG/ML (ref 0–0.04)
TROPONIN I SERPL-MCNC: 3.17 NG/ML (ref 0–0.04)
WBC # BLD AUTO: 10 K/UL (ref 4.6–13.2)

## 2019-11-26 PROCEDURE — 74011636320 HC RX REV CODE- 636/320: Performed by: EMERGENCY MEDICINE

## 2019-11-26 PROCEDURE — 84484 ASSAY OF TROPONIN QUANT: CPT

## 2019-11-26 PROCEDURE — 85025 COMPLETE CBC W/AUTO DIFF WBC: CPT

## 2019-11-26 PROCEDURE — 65660000000 HC RM CCU STEPDOWN

## 2019-11-26 PROCEDURE — 74011250637 HC RX REV CODE- 250/637: Performed by: EMERGENCY MEDICINE

## 2019-11-26 PROCEDURE — 74011000258 HC RX REV CODE- 258: Performed by: EMERGENCY MEDICINE

## 2019-11-26 PROCEDURE — 85610 PROTHROMBIN TIME: CPT

## 2019-11-26 PROCEDURE — 93005 ELECTROCARDIOGRAM TRACING: CPT

## 2019-11-26 PROCEDURE — 82550 ASSAY OF CK (CPK): CPT

## 2019-11-26 PROCEDURE — 74011000250 HC RX REV CODE- 250: Performed by: INTERNAL MEDICINE

## 2019-11-26 PROCEDURE — 84443 ASSAY THYROID STIM HORMONE: CPT

## 2019-11-26 PROCEDURE — 74011250636 HC RX REV CODE- 250/636: Performed by: INTERNAL MEDICINE

## 2019-11-26 PROCEDURE — 71275 CT ANGIOGRAPHY CHEST: CPT

## 2019-11-26 PROCEDURE — 94640 AIRWAY INHALATION TREATMENT: CPT

## 2019-11-26 PROCEDURE — 80053 COMPREHEN METABOLIC PANEL: CPT

## 2019-11-26 PROCEDURE — 93970 EXTREMITY STUDY: CPT

## 2019-11-26 PROCEDURE — 74011636637 HC RX REV CODE- 636/637: Performed by: INTERNAL MEDICINE

## 2019-11-26 PROCEDURE — 99285 EMERGENCY DEPT VISIT HI MDM: CPT

## 2019-11-26 PROCEDURE — 74011250637 HC RX REV CODE- 250/637: Performed by: INTERNAL MEDICINE

## 2019-11-26 PROCEDURE — 82247 BILIRUBIN TOTAL: CPT

## 2019-11-26 PROCEDURE — 94761 N-INVAS EAR/PLS OXIMETRY MLT: CPT

## 2019-11-26 PROCEDURE — 83036 HEMOGLOBIN GLYCOSYLATED A1C: CPT

## 2019-11-26 PROCEDURE — 82248 BILIRUBIN DIRECT: CPT

## 2019-11-26 PROCEDURE — 82962 GLUCOSE BLOOD TEST: CPT

## 2019-11-26 RX ORDER — POTASSIUM CHLORIDE 750 MG/1
10 TABLET, FILM COATED, EXTENDED RELEASE ORAL DAILY
Status: DISCONTINUED | OUTPATIENT
Start: 2019-11-27 | End: 2019-11-29 | Stop reason: HOSPADM

## 2019-11-26 RX ORDER — FUROSEMIDE 20 MG/1
20 TABLET ORAL DAILY
Status: ON HOLD | COMMUNITY
End: 2019-11-29 | Stop reason: SDUPTHER

## 2019-11-26 RX ORDER — GUAIFENESIN 100 MG/5ML
324 LIQUID (ML) ORAL
Status: COMPLETED | OUTPATIENT
Start: 2019-11-26 | End: 2019-11-26

## 2019-11-26 RX ORDER — LISINOPRIL 5 MG/1
5 TABLET ORAL DAILY
Status: DISCONTINUED | OUTPATIENT
Start: 2019-11-27 | End: 2019-11-29 | Stop reason: HOSPADM

## 2019-11-26 RX ORDER — POTASSIUM CHLORIDE 750 MG/1
TABLET, FILM COATED, EXTENDED RELEASE ORAL
Status: ON HOLD | COMMUNITY
End: 2019-11-29 | Stop reason: SDUPTHER

## 2019-11-26 RX ORDER — DOCUSATE SODIUM 100 MG/1
100 CAPSULE, LIQUID FILLED ORAL 2 TIMES DAILY
Status: DISCONTINUED | OUTPATIENT
Start: 2019-11-26 | End: 2019-11-29 | Stop reason: HOSPADM

## 2019-11-26 RX ORDER — INSULIN GLARGINE 100 [IU]/ML
20 INJECTION, SOLUTION SUBCUTANEOUS
Status: DISCONTINUED | OUTPATIENT
Start: 2019-11-27 | End: 2019-11-27

## 2019-11-26 RX ORDER — SODIUM CHLORIDE 0.9 % (FLUSH) 0.9 %
5-40 SYRINGE (ML) INJECTION EVERY 8 HOURS
Status: DISCONTINUED | OUTPATIENT
Start: 2019-11-26 | End: 2019-11-29 | Stop reason: HOSPADM

## 2019-11-26 RX ORDER — INSULIN LISPRO 100 [IU]/ML
INJECTION, SOLUTION INTRAVENOUS; SUBCUTANEOUS
Status: DISCONTINUED | OUTPATIENT
Start: 2019-11-27 | End: 2019-11-27

## 2019-11-26 RX ORDER — IPRATROPIUM BROMIDE AND ALBUTEROL SULFATE 2.5; .5 MG/3ML; MG/3ML
3 SOLUTION RESPIRATORY (INHALATION)
Status: DISCONTINUED | OUTPATIENT
Start: 2019-11-26 | End: 2019-11-29 | Stop reason: HOSPADM

## 2019-11-26 RX ORDER — HEPARIN SODIUM 1000 [USP'U]/ML
60 INJECTION, SOLUTION INTRAVENOUS; SUBCUTANEOUS ONCE
Status: COMPLETED | OUTPATIENT
Start: 2019-11-26 | End: 2019-11-26

## 2019-11-26 RX ORDER — FUROSEMIDE 20 MG/1
20 TABLET ORAL DAILY
Status: DISCONTINUED | OUTPATIENT
Start: 2019-11-27 | End: 2019-11-27

## 2019-11-26 RX ORDER — BUDESONIDE 0.5 MG/2ML
500 INHALANT ORAL
Status: DISCONTINUED | OUTPATIENT
Start: 2019-11-26 | End: 2019-11-29 | Stop reason: HOSPADM

## 2019-11-26 RX ORDER — ATORVASTATIN CALCIUM 20 MG/1
20 TABLET, FILM COATED ORAL
Status: DISCONTINUED | OUTPATIENT
Start: 2019-11-26 | End: 2019-11-29 | Stop reason: HOSPADM

## 2019-11-26 RX ORDER — ADHESIVE BANDAGE
30 BANDAGE TOPICAL DAILY PRN
Status: DISCONTINUED | OUTPATIENT
Start: 2019-11-26 | End: 2019-11-29 | Stop reason: HOSPADM

## 2019-11-26 RX ORDER — METOPROLOL TARTRATE 25 MG/1
25 TABLET, FILM COATED ORAL EVERY 6 HOURS
Status: DISCONTINUED | OUTPATIENT
Start: 2019-11-27 | End: 2019-11-28

## 2019-11-26 RX ORDER — GUAIFENESIN 100 MG/5ML
81 LIQUID (ML) ORAL DAILY
Status: DISCONTINUED | OUTPATIENT
Start: 2019-11-27 | End: 2019-11-29 | Stop reason: HOSPADM

## 2019-11-26 RX ORDER — SODIUM CHLORIDE 0.9 % (FLUSH) 0.9 %
5-40 SYRINGE (ML) INJECTION AS NEEDED
Status: DISCONTINUED | OUTPATIENT
Start: 2019-11-26 | End: 2019-11-29 | Stop reason: HOSPADM

## 2019-11-26 RX ORDER — MAGNESIUM SULFATE 100 %
4 CRYSTALS MISCELLANEOUS AS NEEDED
Status: DISCONTINUED | OUTPATIENT
Start: 2019-11-26 | End: 2019-11-29 | Stop reason: HOSPADM

## 2019-11-26 RX ORDER — ONDANSETRON 2 MG/ML
4 INJECTION INTRAMUSCULAR; INTRAVENOUS
Status: DISCONTINUED | OUTPATIENT
Start: 2019-11-26 | End: 2019-11-29 | Stop reason: HOSPADM

## 2019-11-26 RX ORDER — SODIUM CHLORIDE 9 MG/ML
100 INJECTION, SOLUTION INTRAVENOUS ONCE
Status: COMPLETED | OUTPATIENT
Start: 2019-11-26 | End: 2019-11-26

## 2019-11-26 RX ORDER — HEPARIN SODIUM 10000 [USP'U]/100ML
11-25 INJECTION, SOLUTION INTRAVENOUS
Status: DISPENSED | OUTPATIENT
Start: 2019-11-26 | End: 2019-11-28

## 2019-11-26 RX ORDER — BUDESONIDE 0.25 MG/2ML
INHALANT ORAL
COMMUNITY

## 2019-11-26 RX ORDER — CLOPIDOGREL BISULFATE 75 MG/1
300 TABLET ORAL ONCE
Status: COMPLETED | OUTPATIENT
Start: 2019-11-26 | End: 2019-11-26

## 2019-11-26 RX ADMIN — ASPIRIN 81 MG 324 MG: 81 TABLET ORAL at 18:45

## 2019-11-26 RX ADMIN — CLOPIDOGREL BISULFATE 300 MG: 75 TABLET ORAL at 23:02

## 2019-11-26 RX ADMIN — METOPROLOL TARTRATE 25 MG: 25 TABLET ORAL at 23:53

## 2019-11-26 RX ADMIN — BUDESONIDE 500 MCG: 0.5 INHALANT RESPIRATORY (INHALATION) at 22:50

## 2019-11-26 RX ADMIN — Medication 10 ML: at 23:03

## 2019-11-26 RX ADMIN — ATORVASTATIN CALCIUM 20 MG: 20 TABLET, FILM COATED ORAL at 23:02

## 2019-11-26 RX ADMIN — HEPARIN SODIUM 11 UNITS/KG/HR: 10000 INJECTION, SOLUTION INTRAVENOUS at 23:10

## 2019-11-26 RX ADMIN — SODIUM CHLORIDE 99 ML: 900 INJECTION, SOLUTION INTRAVENOUS at 17:35

## 2019-11-26 RX ADMIN — DOCUSATE SODIUM 100 MG: 100 CAPSULE, LIQUID FILLED ORAL at 23:03

## 2019-11-26 RX ADMIN — IOPAMIDOL 80 ML: 755 INJECTION, SOLUTION INTRAVENOUS at 17:36

## 2019-11-26 RX ADMIN — HEPARIN SODIUM 5170 UNITS: 1000 INJECTION INTRAVENOUS; SUBCUTANEOUS at 23:04

## 2019-11-26 RX ADMIN — INSULIN GLARGINE 20 UNITS: 100 INJECTION, SOLUTION SUBCUTANEOUS at 23:53

## 2019-11-26 NOTE — ED NOTES
As of breath or 2 to 3 weeks. Positive d-dimer in a patient with cancer. Heart rate is 117. Her primary care doctor wanted to rule out PE and DVT. On 2 L home oxygen. I performed a brief evaluation, including history and physical, of the patient here in triage and I have determined that pt will need further treatment and evaluation from the main side ER physician. I have placed initial orders to help in expediting patients care.      November 26, 2019 at 3:27 PM - SAVANNAH Nathan        Visit Vitals  BP (!) 155/93 (BP 1 Location: Right arm, BP Patient Position: At rest)   Pulse (!) 117   Temp 98.2 °F (36.8 °C)   Resp 20   SpO2 97%

## 2019-11-26 NOTE — ED PROVIDER NOTES
[facility] EMERGENCY DEPT      4:57 PM    Date: 11/26/2019  Patient Name: Koko Vanec    History of Presenting Illness     Chief Complaint   Patient presents with    Shortness of Breath       75 y.o. female with noted past medical history who presents to the emergency department with shortness of breath. Patient was seen by her oncologist today and she has a history of prior lung cancer. She was recently diagnosed with COPD and states that when she started using a nebulizer machine she was breathing better. However about 2 weeks ago despite using the nebulizer machine started to have worsening shortness of breath over the last week and a half. That shortness of breath improved and now is essentially gone the last 3 days and she has been still using her nebulizer. She denies any chest pain, arm neck or jaw pain, she denies any new fever chills or URI symptoms. Patient was seen in her oncologist office today and given her tachycardia and shortness of breath he was concerned the patient may have a PE. Patient was socially sent to the ER for evaluation for such. Patient denies any other associated signs or symptoms. Patient denies any other complaints. Nursing notes regarding the HPI and triage nursing notes were reviewed. Prior medical records were reviewed. Current Facility-Administered Medications   Medication Dose Route Frequency Provider Last Rate Last Dose    aspirin chewable tablet 324 mg  324 mg Oral NOW Mague Carrasquillo MD         Current Outpatient Medications   Medication Sig Dispense Refill    revefenacin (YUPELRI) 175 mcg/3 mL nebu nebulizer solution 175 mcg by Nebulization route daily.  OTHER       budesonide (PULMICORT) 0.25 mg/2 mL nbsp by Nebulization route.  furosemide (LASIX) 20 mg tablet Take 20 mg by mouth daily.  potassium chloride SR (KLOR-CON 10) 10 mEq tablet Take  by mouth.  predniSONE (DELTASONE) 5 mg tablet Take 5 mg by mouth.  inhalational spacing device 1 Each by Does Not Apply route as needed. 1 Device 0    amLODIPine (NORVASC) 5 mg tablet Take 5 mg by mouth daily.  insulin lispro protamin/lispro (HUMALOG MIX 75-25 KWIKPEN SC) 50 Units by SubCUTAneous route two (2) times a day. Past History     Past Medical History:  Past Medical History:   Diagnosis Date    [facility] obstructive [facility] disease[facility] [address]     Diabetes mellitus ([facility] [address]     Emphysema of lung ([facility] [address]     Hypertension        Past Surgical History:  Past Surgical History:   Procedure Laterality Date    ABDOMEN SURGERY PROC UNLISTED      HX APPENDECTOMY      HX TONSILLECTOMY         Family History:  History reviewed. No pertinent family history. Social History:  Social History     Tobacco Use    Smoking status: Former Smoker     Years: 24.00     Last attempt to quit: 1978     Years since quittin.4    Smokeless tobacco: Never Used   Substance Use Topics    Alcohol use: No    Drug use: No       Allergies:  No Known Allergies    Patient's primary care provider (as noted in EPIC):  Josh Hopkins MD    Review of Systems   Constitutional: Negative for diaphoresis. HENT: Negative for congestion. Eyes: Negative for discharge. Respiratory: Positive for shortness of breath. Negative for stridor. Cardiovascular: Negative for chest pain and palpitations. Gastrointestinal: Negative for diarrhea. Endocrine: Negative for heat intolerance. Genitourinary: Negative for flank pain. Musculoskeletal: Negative for back pain. Neurological: Negative for weakness. Psychiatric/Behavioral: Negative for hallucinations. All other systems reviewed and are negative. Visit Vitals  /66   Pulse (!) 106   Temp 98.2 °F (36.8 °C)   Resp 18   Ht 5' 6\" (1.676 m)   Wt 86.2 kg (190 lb)   SpO2 100%   BMI 30.67 kg/m²       PHYSICAL EXAM:    CONSTITUTIONAL:  Alert, in no apparent distress;  well developed;  well nourished.   HEAD: Normocephalic, atraumatic. EYES:  EOMI. Non-icteric sclera. Normal conjunctiva. ENTM:  Nose:  no rhinorrhea. Throat:  no erythema or exudate, mucous membranes moist.  NECK:  No JVD. Supple    RESPIRATORY:  Chest clear, equal breath sounds, good air movement. CARDIOVASCULAR:  Regular rate and rhythm. No murmurs, rubs, or gallops. GI:  Normal bowel sounds, abdomen soft and non-tender. No rebound or guarding. BACK:  Non-tender. UPPER EXT:  Normal inspection. LOWER EXT: Bilateral 2+ lower extremity pitting edema, no calf tenderness. Distal pulses intact. NEURO:  Moves all four extremities, and grossly normal motor exam.  SKIN:  No rashes;  Normal for age. PSYCH:  Alert and normal affect. DIFFERENTIAL DIAGNOSES/ MEDICAL DECISION MAKING:   Shortness of breath etiologies include chronic obstructive pulmonary disease (COPD), acute asthma exacerbation, congestive heart failure, pneumonia, acute bronchitis, pulmonary embolism, upper respiratory infection, cardiac event to include acute coronary syndrome, acute myocardial infarction or a combination of the above (ex URI on top of COPD thus causing respiratory distress). Diagnostic Study Results     Abnormal lab results from this emergency department encounter:  Labs Reviewed   CBC WITH AUTOMATED DIFF - Abnormal; Notable for the following components:       Result Value    HGB 11.8 (*)     NEUTROPHILS 83 (*)     LYMPHOCYTES 10 (*)     ABS. NEUTROPHILS 8.3 (*)     All other components within normal limits   METABOLIC PANEL, COMPREHENSIVE - Abnormal; Notable for the following components:    Sodium 135 (*)     Glucose 314 (*)     Alk.  phosphatase 155 (*)     Albumin 2.6 (*)     Globulin 4.7 (*)     A-G Ratio 0.6 (*)     All other components within normal limits   TROPONIN I - Abnormal; Notable for the following components:    Troponin-I, QT 3.17 (*)     All other components within normal limits       Lab values for this patient within approximately the last 12 hours:  Recent Results (from the past 12 hour(s))   EKG, 12 LEAD, INITIAL    Collection Time: 11/26/19  4:02 PM   Result Value Ref Range    Ventricular Rate 107 BPM    Atrial Rate 107 BPM    P-R Interval 148 ms    QRS Duration 78 ms    Q-T Interval 376 ms    QTC Calculation (Bezet) 501 ms    Calculated P Axis 52 degrees    Calculated R Axis 33 degrees    Calculated T Axis 86 degrees    Diagnosis       Sinus tachycardia  Minimal voltage criteria for LVH, may be normal variant ( Sokolow-Calderón )  Nonspecific T wave abnormality  Abnormal ECG  When compared with ECG of 24-APR-2019 16:03,  Nonspecific T wave abnormality now evident in Anterior leads     CBC WITH AUTOMATED DIFF    Collection Time: 11/26/19  4:05 PM   Result Value Ref Range    WBC 10.0 4.6 - 13.2 K/uL    RBC 4.23 4.20 - 5.30 M/uL    HGB 11.8 (L) 12.0 - 16.0 g/dL    HCT 36.6 35.0 - 45.0 %    MCV 86.5 74.0 - 97.0 FL    MCH 27.9 24.0 - 34.0 PG    MCHC 32.2 31.0 - 37.0 g/dL    RDW 13.3 11.6 - 14.5 %    PLATELET 651 965 - 322 K/uL    MPV 11.0 9.2 - 11.8 FL    NEUTROPHILS 83 (H) 40 - 73 %    LYMPHOCYTES 10 (L) 21 - 52 %    MONOCYTES 6 3 - 10 %    EOSINOPHILS 1 0 - 5 %    BASOPHILS 0 0 - 2 %    ABS. NEUTROPHILS 8.3 (H) 1.8 - 8.0 K/UL    ABS. LYMPHOCYTES 1.0 0.9 - 3.6 K/UL    ABS. MONOCYTES 0.6 0.05 - 1.2 K/UL    ABS. EOSINOPHILS 0.1 0.0 - 0.4 K/UL    ABS.  BASOPHILS 0.0 0.0 - 0.1 K/UL    DF AUTOMATED     METABOLIC PANEL, COMPREHENSIVE    Collection Time: 11/26/19  4:05 PM   Result Value Ref Range    Sodium 135 (L) 136 - 145 mmol/L    Potassium 4.3 3.5 - 5.5 mmol/L    Chloride 102 100 - 111 mmol/L    CO2 27 21 - 32 mmol/L    Anion gap 6 3.0 - 18 mmol/L    Glucose 314 (H) 74 - 99 mg/dL    BUN 15 7.0 - 18 MG/DL    Creatinine 0.87 0.6 - 1.3 MG/DL    BUN/Creatinine ratio 17 12 - 20      GFR est AA >60 >60 ml/min/1.73m2    GFR est non-AA >60 >60 ml/min/1.73m2    Calcium 9.2 8.5 - 10.1 MG/DL    Bilirubin, total 0.2 0.2 - 1.0 MG/DL    ALT (SGPT) 23 13 - 56 U/L AST (SGOT) 17 10 - 38 U/L    Alk. phosphatase 155 (H) 45 - 117 U/L    Protein, total 7.3 6.4 - 8.2 g/dL    Albumin 2.6 (L) 3.4 - 5.0 g/dL    Globulin 4.7 (H) 2.0 - 4.0 g/dL    A-G Ratio 0.6 (L) 0.8 - 1.7     TROPONIN I    Collection Time: 11/26/19  4:05 PM   Result Value Ref Range    Troponin-I, QT 3.17 (HH) 0.0 - 0.045 NG/ML       Radiologist and cardiologist interpretations if available at time of this note:  Cta Chest W Or W Wo Cont    Result Date: 11/26/2019  EXAM: CTA chest INDICATION: Shortness of breath, right lung neoplasm COMPARISON: April 24, 2019 TECHNIQUE: Axial CT imaging from the thoracic inlet through the diaphragm with intravenous contrast. Coronal and sagittal MIP reformats were generated. One or more dose reduction techniques were used on this CT: automated exposure control, adjustment of the mAs and/or kVp according to patient size, and iterative reconstruction techniques. The specific techniques used on this CT exam have been documented in the patient's electronic medical record. Digital Imaging and Communications in Medicine (DICOM) format image data are available to nonaffiliated external healthcare facilities or entities on a secure, media free, reciprocally searchable basis with patient authorization for at least a 12-month period after this study. _______________ FINDINGS: EXAM QUALITY: Overall exam quality is satisfactory. Pulmonary arterial enhancement is adequate with adequate breath hold and no significant artifact. PULMONARY ARTERIES: There is no evidence of a central pulmonary embolism. There is now no flow seen in the right lower lobe pulmonary arteries. This may be secondary to encasement by tumor or post radiation changes less likely pulmonary embolism. This is a new finding from the prior exam. In addition the right inferior pulmonary vein also appears to be occluded. This is more suggestive of tumor involvement rather than pulmonary embolism.  LYMPH Nodes: No enlarged lymph nodes seen PLEURA: There is a moderate size right pleural effusion which is increased from prior exam possibly malignant. HEART: Cardiac size is enlarged. Small amount of pericardial effusion is seen. Moderate calcific coronary artery disease present. VASCULATURE/MEDIASTINUM: There is no aortic dissection. Small hiatal hernia is present. LUNGS: There is again seen right perihilar consolidation extending to the right middle and right lower lobes. There is worsening consolidation in the right lower lobe with very little aeration. Differential diagnoses includes post radiation changes versus recurrence of tumor with possible postobstructive pneumonia. Findings have progressed since the prior exam. Minimal patchy infiltrate seen in the left upper lobe. May represent pneumonitis. . AIRWAY: There is severe narrowing of the right lower lobe airways. This may be secondary to encasement. UPPER ABDOMEN: Visualized liver, spleen, kidneys, adrenals, are unremarkable. OTHER: No acute or aggressive osseous abnormalities identified. _______________     IMPRESSION: There is now new occlusion of the right lower lobe pulmonary artery and right inferior pulmonary vein. This is suggestive of encasement by tumor or stricturing from radiation changes, less likely due to pulmonary embolism. Dr. Celina Emmanuel informed 6:20 PM November 26, 2019. There is increasing right pleural effusion likely malignant Increasing consolidation right lower lobe with narrowing of the bronchi with very little aeration to the right lower lobe. Again differential diagnoses includes recurrence of tumor and/or post radiation changes with postobstructive pneumonia. PET/CT imaging would be of help for further evaluation these findings.       EKG #1: Emergency physician interpretation of EKG: Sinus tachycardia about 105 bpm.    EKG #2: Emergency physician interpretation of EKG: Sinus tachycardia about 105 bpm.    Medication(s) ordered for patient during this emergency visit encounter:  Medications   aspirin chewable tablet 324 mg (has no administration in time range)   iopamidol (ISOVUE-370) 76 % injection 80 mL (80 mL IntraVENous Given 11/26/19 1736)   0.9% sodium chloride infusion 100 mL (0 mL IntraVENous IV Completed 11/26/19 1736)       Medical Decision Making     I am the first provider for this patient. I reviewed the vital signs, available nursing notes, past medical history, past surgical history, family history and social history. Vital Signs:  Reviewed the patient's vital signs. ED COURSE:    6:16 PM  There is a problem with the bilateral PVL being uploaded into the radiology system. I spoke to the ultrasound tech who performed the study and she states that bilateral lower extremity PVLs were both negative for DVT. Based on the patient's history of presenting illness, physical examination, laboratory, radiographic, and/or tests results, and response to medical interventions, I believe the patient most likely is having chest pain with suspicion for cardiac etiology/ acute coronary syndrome. I believe that the patient needs to be admitted for formal rule out of acute myocardial infarction and further cardiac work-up. What aspirin 324 mg PO was given to patient while in the emergency department. 6:57 PM  On serial exams the patient continues remain chest pain-free. Admit to Hospitalist    The patient was presented to the accepting hospitalist, Dr. Maximus Oconnor. The patient's primary doctor is Osorio Lindsey MD, and admissions for this physician are with the hospitalist.  If the patient has no primary doctor, then admission is to the hospitalist as well. As the emergency physician, I wrote courtesy admission orders for the hospitalist physician. The courtesy orders included explicit instructions for the floor nursing staff to call the admitting attending physician upon patient arrival on the floor.      Consult Cardiology    The on call cardiologist was called and the patient was presented for cardiology consult. I personally spoke with SAVANNAH Arredondo, in the cardiology group, about the patient's presentation and management. I subsequently placed the noted cardiologist on the treatment team.    Critical Care Note:    Acute Myocardial Infarction    Critical care minutes: 52 MINUTES. Given patient's initial arrival presentation with acute myocardial infarction, numerous serial reevaluations of the patient's respiratory status and patient's response to various medical interventions. Given the patients underlying condition medical intervention(s) were needed, requiring numerous reevaluations of patient's vital signs and response to different emergency department therapies, total bedside time evaluating and/or treating the patient, not including procedures, is noted below. Coding Diagnoses     Clinical Impression:   1. Non-STEMI (non-ST elevated myocardial infarction) ([address]    2. SOB (shortness of breath)    3. Bilateral lower extremity edema    4. History of COPD    5. History of lung cancer        Disposition     Disposition:  Admit. ISIAH Caruso Board Certified Emergency Physician    Provider Attestation:  If a scribe was utilized in generation of this patient record, I personally performed the services described in the documentation, reviewed the documentation, as recorded by the scribe in my presence, and it accurately records the patient's history of presenting illness, review of systems, patient physical examination, and procedures performed by me as the attending physician. ISIAH Caruso Board Certified Emergency Physician  11/26/2019.

## 2019-11-27 ENCOUNTER — APPOINTMENT (OUTPATIENT)
Dept: NON INVASIVE DIAGNOSTICS | Age: 64
DRG: 280 | End: 2019-11-27
Attending: INTERNAL MEDICINE
Payer: MEDICARE

## 2019-11-27 LAB
ALBUMIN SERPL-MCNC: 2.5 G/DL (ref 3.4–5)
ALBUMIN/GLOB SERPL: 0.5 {RATIO} (ref 0.8–1.7)
ALP SERPL-CCNC: 129 U/L (ref 45–117)
ALT SERPL-CCNC: 21 U/L (ref 13–56)
ANION GAP SERPL CALC-SCNC: 5 MMOL/L (ref 3–18)
APTT PPP: 48.1 SEC (ref 23–36.4)
APTT PPP: 53.6 SEC (ref 23–36.4)
APTT PPP: 70.7 SEC (ref 23–36.4)
AST SERPL-CCNC: 13 U/L (ref 10–38)
ATRIAL RATE: 105 BPM
ATRIAL RATE: 107 BPM
AV PEAK GRADIENT: 59.76 MMHG
BASOPHILS # BLD: 0 K/UL (ref 0–0.1)
BASOPHILS NFR BLD: 1 % (ref 0–2)
BILIRUB SERPL-MCNC: 0.3 MG/DL (ref 0.2–1)
BUN SERPL-MCNC: 13 MG/DL (ref 7–18)
BUN/CREAT SERPL: 19 (ref 12–20)
CALCIUM SERPL-MCNC: 9.2 MG/DL (ref 8.5–10.1)
CALCULATED P AXIS, ECG09: 44 DEGREES
CALCULATED P AXIS, ECG09: 52 DEGREES
CALCULATED R AXIS, ECG10: -18 DEGREES
CALCULATED R AXIS, ECG10: 33 DEGREES
CALCULATED T AXIS, ECG11: 68 DEGREES
CALCULATED T AXIS, ECG11: 86 DEGREES
CHLORIDE SERPL-SCNC: 103 MMOL/L (ref 100–111)
CHOLEST SERPL-MCNC: 206 MG/DL
CO2 SERPL-SCNC: 29 MMOL/L (ref 21–32)
CREAT SERPL-MCNC: 0.67 MG/DL (ref 0.6–1.3)
DIAGNOSIS, 93000: NORMAL
DIAGNOSIS, 93000: NORMAL
DIFFERENTIAL METHOD BLD: ABNORMAL
ECHO AO ASC DIAM: 3.31 CM
ECHO AO ROOT DIAM: 3.52 CM
ECHO AV REGURGITANT PHT: 289.9 CM
ECHO IVC SNIFF: 2.5 CM
ECHO LA MAJOR AXIS: 2.7 CM
ECHO LA TO AORTIC ROOT RATIO: 0.77
ECHO LV EDV A2C: 195.2 ML
ECHO LV EDV A4C: 158.8 ML
ECHO LV EDV BP: 179.2 ML (ref 56–104)
ECHO LV EDV INDEX A4C: 81.3 ML/M2
ECHO LV EDV INDEX BP: 91.8 ML/M2
ECHO LV EDV NDEX A2C: 100 ML/M2
ECHO LV EDV TEICHHOLZ: 0.6 ML
ECHO LV EJECTION FRACTION A2C: 31 %
ECHO LV EJECTION FRACTION A4C: 42 %
ECHO LV EJECTION FRACTION BIPLANE: 37.7 % (ref 55–100)
ECHO LV ESV A2C: 133.8 ML
ECHO LV ESV A4C: 92.6 ML
ECHO LV ESV BP: 111.7 ML (ref 19–49)
ECHO LV ESV INDEX A2C: 68.5 ML/M2
ECHO LV ESV INDEX A4C: 47.4 ML/M2
ECHO LV ESV INDEX BP: 57.2 ML/M2
ECHO LV ESV TEICHHOLZ: 0.57 ML
ECHO LV INTERNAL DIMENSION DIASTOLIC: 4.67 CM (ref 3.9–5.3)
ECHO LV INTERNAL DIMENSION SYSTOLIC: 4.55 CM
ECHO LV IVSD: 1.15 CM (ref 0.6–0.9)
ECHO LV MASS 2D: 200.9 G (ref 67–162)
ECHO LV MASS INDEX 2D: 102.9 G/M2 (ref 43–95)
ECHO LV POSTERIOR WALL DIASTOLIC: 0.93 CM (ref 0.6–0.9)
ECHO LVOT DIAM: 2.24 CM
ECHO LVOT PEAK GRADIENT: 4.3 MMHG
ECHO LVOT PEAK VELOCITY: 104.15 CM/S
ECHO LVOT SV: 69.7 ML
ECHO LVOT VTI: 17.67 CM
ECHO MV A VELOCITY: 117.92 CM/S
ECHO MV AREA PHT: 4.4 CM2
ECHO MV E DECELERATION TIME (DT): 172.6 MS
ECHO MV E VELOCITY: 77.72 CM/S
ECHO MV E/A RATIO: 0.66
ECHO MV PRESSURE HALF TIME (PHT): 50 MS
ECHO MV REGURGITANT RADIUS PISA: 0.76 CM
ECHO PULMONARY ARTERY SYSTOLIC PRESSURE (PASP): 40.7 MMHG
ECHO RA MINOR AXIS: 5.12 CM
ECHO TV REGURGITANT MAX VELOCITY: 286 CM/S
ECHO TV REGURGITANT PEAK GRADIENT: 32.7 MMHG
EOSINOPHIL # BLD: 0.3 K/UL (ref 0–0.4)
EOSINOPHIL NFR BLD: 3 % (ref 0–5)
ERYTHROCYTE [DISTWIDTH] IN BLOOD BY AUTOMATED COUNT: 13.4 % (ref 11.6–14.5)
EST. AVERAGE GLUCOSE BLD GHB EST-MCNC: 243 MG/DL
GLOBULIN SER CALC-MCNC: 5.2 G/DL (ref 2–4)
GLUCOSE BLD STRIP.AUTO-MCNC: 163 MG/DL (ref 70–110)
GLUCOSE BLD STRIP.AUTO-MCNC: 208 MG/DL (ref 70–110)
GLUCOSE BLD STRIP.AUTO-MCNC: 273 MG/DL (ref 70–110)
GLUCOSE BLD STRIP.AUTO-MCNC: 283 MG/DL (ref 70–110)
GLUCOSE SERPL-MCNC: 164 MG/DL (ref 74–99)
HBA1C MFR BLD: 10.1 % (ref 4.2–5.6)
HCT VFR BLD AUTO: 37.2 % (ref 35–45)
HDLC SERPL-MCNC: 68 MG/DL (ref 40–60)
HDLC SERPL: 3 {RATIO} (ref 0–5)
HGB BLD-MCNC: 11.7 G/DL (ref 12–16)
LDLC SERPL CALC-MCNC: 109.8 MG/DL (ref 0–100)
LIPID PROFILE,FLP: ABNORMAL
LVFS 2D: 2.58 %
LVOT MG: 2.33 MMHG
LVOT MV: 0.71 CM/S
LVSV (MOD BI): 33.8 ML
LVSV (MOD SINGLE 4C): 33.14 ML
LVSV (MOD SINGLE): 30.73 ML
LVSV (TEICH): 3 ML
LYMPHOCYTES # BLD: 1.5 K/UL (ref 0.9–3.6)
LYMPHOCYTES NFR BLD: 17 % (ref 21–52)
MCH RBC QN AUTO: 27.5 PG (ref 24–34)
MCHC RBC AUTO-ENTMCNC: 31.5 G/DL (ref 31–37)
MCV RBC AUTO: 87.5 FL (ref 74–97)
MONOCYTES # BLD: 0.7 K/UL (ref 0.05–1.2)
MONOCYTES NFR BLD: 8 % (ref 3–10)
MV DEC SLOPE: 4.5
NEUTS SEG # BLD: 6.4 K/UL (ref 1.8–8)
NEUTS SEG NFR BLD: 71 % (ref 40–73)
P-R INTERVAL, ECG05: 148 MS
P-R INTERVAL, ECG05: 152 MS
PISA AR MAX VEL: 386.54 CM/S
PLATELET # BLD AUTO: 406 K/UL (ref 135–420)
PMV BLD AUTO: 10.3 FL (ref 9.2–11.8)
POTASSIUM SERPL-SCNC: 3.7 MMOL/L (ref 3.5–5.5)
PROT SERPL-MCNC: 7.7 G/DL (ref 6.4–8.2)
Q-T INTERVAL, ECG07: 348 MS
Q-T INTERVAL, ECG07: 376 MS
QRS DURATION, ECG06: 78 MS
QRS DURATION, ECG06: 80 MS
QTC CALCULATION (BEZET), ECG08: 459 MS
QTC CALCULATION (BEZET), ECG08: 501 MS
RBC # BLD AUTO: 4.25 M/UL (ref 4.2–5.3)
SODIUM SERPL-SCNC: 137 MMOL/L (ref 136–145)
TRIGL SERPL-MCNC: 141 MG/DL (ref ?–150)
TROPONIN I SERPL-MCNC: 2.54 NG/ML (ref 0–0.04)
TSH SERPL DL<=0.05 MIU/L-ACNC: 2.05 UIU/ML (ref 0.36–3.74)
VENTRICULAR RATE, ECG03: 105 BPM
VENTRICULAR RATE, ECG03: 107 BPM
VLDLC SERPL CALC-MCNC: 28.2 MG/DL
WBC # BLD AUTO: 8.9 K/UL (ref 4.6–13.2)

## 2019-11-27 PROCEDURE — 36415 COLL VENOUS BLD VENIPUNCTURE: CPT

## 2019-11-27 PROCEDURE — 74011000250 HC RX REV CODE- 250: Performed by: HOSPITALIST

## 2019-11-27 PROCEDURE — 80053 COMPREHEN METABOLIC PANEL: CPT

## 2019-11-27 PROCEDURE — 85730 THROMBOPLASTIN TIME PARTIAL: CPT

## 2019-11-27 PROCEDURE — 74011636637 HC RX REV CODE- 636/637: Performed by: HOSPITALIST

## 2019-11-27 PROCEDURE — 74011250636 HC RX REV CODE- 250/636: Performed by: HOSPITALIST

## 2019-11-27 PROCEDURE — 82962 GLUCOSE BLOOD TEST: CPT

## 2019-11-27 PROCEDURE — 65660000000 HC RM CCU STEPDOWN

## 2019-11-27 PROCEDURE — 74011250636 HC RX REV CODE- 250/636: Performed by: INTERNAL MEDICINE

## 2019-11-27 PROCEDURE — 94640 AIRWAY INHALATION TREATMENT: CPT

## 2019-11-27 PROCEDURE — 77030021352 HC CBL LD SYS DISP COVD -B

## 2019-11-27 PROCEDURE — 74011000258 HC RX REV CODE- 258: Performed by: INTERNAL MEDICINE

## 2019-11-27 PROCEDURE — 85025 COMPLETE CBC W/AUTO DIFF WBC: CPT

## 2019-11-27 PROCEDURE — 74011250637 HC RX REV CODE- 250/637: Performed by: INTERNAL MEDICINE

## 2019-11-27 PROCEDURE — 77010033678 HC OXYGEN DAILY

## 2019-11-27 PROCEDURE — 94761 N-INVAS EAR/PLS OXIMETRY MLT: CPT

## 2019-11-27 PROCEDURE — 84484 ASSAY OF TROPONIN QUANT: CPT

## 2019-11-27 PROCEDURE — 74011000250 HC RX REV CODE- 250: Performed by: INTERNAL MEDICINE

## 2019-11-27 PROCEDURE — C8929 TTE W OR WO FOL WCON,DOPPLER: HCPCS

## 2019-11-27 PROCEDURE — 80061 LIPID PANEL: CPT

## 2019-11-27 PROCEDURE — 74011250637 HC RX REV CODE- 250/637: Performed by: PHYSICIAN ASSISTANT

## 2019-11-27 RX ORDER — HEPARIN SODIUM 1000 [USP'U]/ML
INJECTION, SOLUTION INTRAVENOUS; SUBCUTANEOUS
Status: COMPLETED
Start: 2019-11-27 | End: 2019-11-28

## 2019-11-27 RX ORDER — DIPHENHYDRAMINE HCL 25 MG
25 CAPSULE ORAL
Status: DISCONTINUED | OUTPATIENT
Start: 2019-11-27 | End: 2019-11-29 | Stop reason: HOSPADM

## 2019-11-27 RX ORDER — INSULIN GLARGINE 100 [IU]/ML
24 INJECTION, SOLUTION SUBCUTANEOUS
Status: DISCONTINUED | OUTPATIENT
Start: 2019-11-27 | End: 2019-11-29 | Stop reason: HOSPADM

## 2019-11-27 RX ORDER — INSULIN LISPRO 100 [IU]/ML
INJECTION, SOLUTION INTRAVENOUS; SUBCUTANEOUS
Status: DISCONTINUED | OUTPATIENT
Start: 2019-11-27 | End: 2019-11-29 | Stop reason: HOSPADM

## 2019-11-27 RX ORDER — HEPARIN SODIUM 1000 [USP'U]/ML
3000 INJECTION, SOLUTION INTRAVENOUS; SUBCUTANEOUS ONCE
Status: COMPLETED | OUTPATIENT
Start: 2019-11-28 | End: 2019-11-28

## 2019-11-27 RX ORDER — HEPARIN SODIUM 1000 [USP'U]/ML
3000 INJECTION, SOLUTION INTRAVENOUS; SUBCUTANEOUS ONCE
Status: COMPLETED | OUTPATIENT
Start: 2019-11-27 | End: 2019-11-27

## 2019-11-27 RX ORDER — INSULIN GLARGINE 100 [IU]/ML
20 INJECTION, SOLUTION SUBCUTANEOUS DAILY
COMMUNITY

## 2019-11-27 RX ORDER — FUROSEMIDE 10 MG/ML
40 INJECTION INTRAMUSCULAR; INTRAVENOUS ONCE
Status: COMPLETED | OUTPATIENT
Start: 2019-11-27 | End: 2019-11-27

## 2019-11-27 RX ORDER — VANCOMYCIN 2 GRAM/500 ML IN 0.9 % SODIUM CHLORIDE INTRAVENOUS
2000 ONCE
Status: COMPLETED | OUTPATIENT
Start: 2019-11-27 | End: 2019-11-27

## 2019-11-27 RX ADMIN — FUROSEMIDE 20 MG: 20 TABLET ORAL at 09:13

## 2019-11-27 RX ADMIN — METOPROLOL TARTRATE 25 MG: 25 TABLET ORAL at 06:37

## 2019-11-27 RX ADMIN — METOPROLOL TARTRATE 25 MG: 25 TABLET ORAL at 12:15

## 2019-11-27 RX ADMIN — POTASSIUM CHLORIDE 10 MEQ: 750 TABLET, EXTENDED RELEASE ORAL at 09:12

## 2019-11-27 RX ADMIN — BUDESONIDE 500 MCG: 0.5 INHALANT RESPIRATORY (INHALATION) at 07:58

## 2019-11-27 RX ADMIN — INSULIN LISPRO 6 UNITS: 100 INJECTION, SOLUTION INTRAVENOUS; SUBCUTANEOUS at 09:14

## 2019-11-27 RX ADMIN — PIPERACILLIN AND TAZOBACTAM 3.38 G: 3; .375 INJECTION, POWDER, LYOPHILIZED, FOR SOLUTION INTRAVENOUS at 16:48

## 2019-11-27 RX ADMIN — METOPROLOL TARTRATE 25 MG: 25 TABLET ORAL at 17:28

## 2019-11-27 RX ADMIN — DOCUSATE SODIUM 100 MG: 100 CAPSULE, LIQUID FILLED ORAL at 09:13

## 2019-11-27 RX ADMIN — Medication 10 ML: at 06:37

## 2019-11-27 RX ADMIN — FUROSEMIDE 40 MG: 10 INJECTION, SOLUTION INTRAMUSCULAR; INTRAVENOUS at 13:51

## 2019-11-27 RX ADMIN — PIPERACILLIN AND TAZOBACTAM 3.38 G: 3; .375 INJECTION, POWDER, LYOPHILIZED, FOR SOLUTION INTRAVENOUS at 12:08

## 2019-11-27 RX ADMIN — LISINOPRIL 5 MG: 5 TABLET ORAL at 09:13

## 2019-11-27 RX ADMIN — PERFLUTREN 1 ML: 6.52 INJECTION, SUSPENSION INTRAVENOUS at 13:40

## 2019-11-27 RX ADMIN — INSULIN LISPRO 9 UNITS: 100 INJECTION, SOLUTION INTRAVENOUS; SUBCUTANEOUS at 17:28

## 2019-11-27 RX ADMIN — HEPARIN SODIUM 3000 UNITS: 1000 INJECTION INTRAVENOUS; SUBCUTANEOUS at 16:06

## 2019-11-27 RX ADMIN — Medication 10 ML: at 13:51

## 2019-11-27 RX ADMIN — VANCOMYCIN HYDROCHLORIDE 2000 MG: 10 INJECTION, POWDER, LYOPHILIZED, FOR SOLUTION INTRAVENOUS at 13:51

## 2019-11-27 RX ADMIN — BUDESONIDE 500 MCG: 0.5 INHALANT RESPIRATORY (INHALATION) at 20:54

## 2019-11-27 RX ADMIN — INSULIN GLARGINE 24 UNITS: 100 INJECTION, SOLUTION SUBCUTANEOUS at 21:22

## 2019-11-27 RX ADMIN — DIPHENHYDRAMINE HYDROCHLORIDE 25 MG: 25 CAPSULE ORAL at 16:49

## 2019-11-27 RX ADMIN — PIPERACILLIN AND TAZOBACTAM 3.38 G: 3; .375 INJECTION, POWDER, LYOPHILIZED, FOR SOLUTION INTRAVENOUS at 23:51

## 2019-11-27 RX ADMIN — REVEFENACIN 175 MCG: 175 SOLUTION RESPIRATORY (INHALATION) at 11:46

## 2019-11-27 RX ADMIN — Medication 10 ML: at 21:24

## 2019-11-27 RX ADMIN — ASPIRIN 81 MG 81 MG: 81 TABLET ORAL at 09:13

## 2019-11-27 RX ADMIN — INSULIN LISPRO 9 UNITS: 100 INJECTION, SOLUTION INTRAVENOUS; SUBCUTANEOUS at 21:23

## 2019-11-27 RX ADMIN — INSULIN LISPRO 3 UNITS: 100 INJECTION, SOLUTION INTRAVENOUS; SUBCUTANEOUS at 12:48

## 2019-11-27 RX ADMIN — ATORVASTATIN CALCIUM 20 MG: 20 TABLET, FILM COATED ORAL at 21:22

## 2019-11-27 RX ADMIN — METOPROLOL TARTRATE 25 MG: 25 TABLET ORAL at 23:52

## 2019-11-27 RX ADMIN — HEPARIN SODIUM 16 UNITS/KG/HR: 10000 INJECTION, SOLUTION INTRAVENOUS at 23:59

## 2019-11-27 NOTE — PROGRESS NOTES
Hematology / Oncology Progress Note       Patient seen and examined this evening. Agree with note outlined by Jonathan Patricia NP       Patient has locally-advanced but non-metastatic unresectable non-small cell lung cancer. She has been through 2 different lines of systemic Rx most recent one immune checkpoint inhibitor Pembrolizumab. She is now admitted with a myocardial injury and elevated troponin, no ST elevation on EKG. Patient is well known to Dr Alissa Spivey and also Dr Palmira Villalta from pulmonary service. We have no objection to thoracentesis. Cancer had been well controlled with systemic Rx for the last 18 months and we have no reason to suspect shortened survival in the absence of distant mets. Cardiology W/U and management of myocardial injury    Discussed case by phone with Ochoa Marcos, [id number] from cardiac service. Dr Ariela Motta is available tomorrow for questions ([phone])       [place]         =======================================================================    Patient Name:  Jama Ramirez                                                                          MRN: [id number]                                                              Location: [facility]                                                                  :  1955                                                                          Date: 10/08/2019  Seen By:  Jonathan Patricia NP  Attending Provider:  Rigoberto Nance                                                    Problem/ Reason for Visit:  1. Locally advanced non-small cell lung cancer. a. Incidentally discovered during workup of monoclonal gammopathy.  T4N1. Squamous cell histology; PD-L1 90%+. 2.        Monoclonal gammopathy. a. Discovered in 2018; 0.7 g/dL IgG kappa.   3.        Right lower lobe mass, incidentally discovered on workup of monoclonal gammopathy. Interval History:  Ms. Quinton Gee presents today for a followup office visit. Clinically, she remains stable. She states she now has a cough, nonproductive. Her dyspnea on exertion is stable. and she denies cough. She has not noticed any swollen lymph nodes or any increased abdominal girth. CT scan completed 10/1/19 was ordered by Dr. Denver Purple. She does not have followup arranged with them until 10/17/19. Her blood sugars have not yet been well controlled. She is seeing a new endocrinologist but can not name who that is. She is seeing the nutritionist 10/9/19. Wei Carls Appetite and energy preserved. She states her bilateral arms are sore. She is traveling to the [facility] in December for a wedding. Past Medical History:  DM2-insulin and metformin, HTN, Hypovitamin D      Current Medications:  Novolin 70/30 (Insulin NPH-Regular Human Rec Subcutaneous 100 unit/mL (70-30)) unit/mL (70-30) as directed  Ergocalciferol Oral 50,000 unit capsule QD7  Amlodipine Oral 10 mg tablet daily  Metformin Oral 1,000 mg tablet bid    Allergies:  losartan    Social History and Habits:  She is retired. She is  and has one grown up daughter. She does not consume tobacco or alcohol. Family History:  Father (comments: STROKES): ; Mother: ; Sister 1: Diabetes; Aunt - Maternal (2nd Degree):  - Breast cancer    Review of Systems:  CONSTITUTIONAL:  No night sweats, fever, chills, anorexia, or weight loss. NEUROLOGIC:  No TIA/CVA, or seizures. HEAD & NECK:  No earache, sore throat, dysphagia, or sinus pains. RESPIRATORY:  No asthma, emphysema, or hemoptysis. +cough  CARDIOVASCULAR:  No angina, shortness of breath at rest, dyspnea on exertion, orthopnea, or PND. GASTROINTESTINAL:  No nausea, vomiting, abdominal pain, diarrhea, or constipation. GENITOURINARY:  No dysuria, polyuria, or gross hematuria. SKIN:  No jaundice or easy bruisability.   ENDOCRINE:  No heat or cold intolerance. She is diabetic. HEMATOLOGIC:  No petechiae, purpura, or epistaxis. Physical Exam:    GENERAL:  Alert, oriented to time/place/person, in no apparent distress. VITAL SIGNS:  As below, noted. HEENT:  Pupils equally rounded and reacted to light and accommodations, extraocular movements intact. NEURO:  Cranial nerves II through XII grossly intact, both motor and sensory  functions bilaterally symmetric and normal, no peripheral neuropathy. SKIN:  No easy bruisability, petechiae, or purpura. NECK:  Supple, no jugular venous distention. LYMPHATIC:  No occipital, supraclavicular, cervical, axillary, or inguinal lymphadenopathies appreciated. LUNGS:  Coarse BS through auscultation RLL, no crackles or wheezes. CVS:  Regular rhythm and rate. ABDOMEN:  Soft, nontender, no organomegaly, with normoactive bowel sounds. EXTREMITIES:  No clubbing, cyanosis, or edema. Laboratory Data:  Noted and reviewed, from 10/1/19. Assessment:  1. IgG kappa monoclonal gammopathy. a. Discovered incidentally during workup of mild anemia; April 2018.  b.        M-spike=0.7, 24hr urine with normal immunofixation, 5/29/18. 2.        Locally advanced non-small cell lung cancer. a. Right lower lobe lung mass, CT scan 6/19/18 incidentally found on workup of monoclonal gammopathy. b.        Core lung biopsy, 7/9/18 squamous cell carcinoma. Napsin-A -, TTF-1 -, PDL-1 99% expressed. CK5/6 +, and p63 +.   c.        Staging studies ? PET/CT shows locally advanced disease associated with hilar lymphadenopathy. An incidentally discovered single pathologically enlarged left axillary lymph node was noted, which will be monitored closely (mammography, unremarkable). MRI of the brain, negative. d. Definitive therapy using concurrent chemoradiation, August 23, 2018 (weekly Carboplatin and Paclitaxel). Completed 10/5/18.  e. Initial plan to undergo consolidation Durvalumab. Since she had an early progressive disease following chemoradiation, she was started on Pembrolizumab ([place] Stain). f.        CT chest 12/5/18 Progression of the right lower lobe interstitial and airspace process, now involving the middle and lower lobes with new pleural thickening. Concerning for progression of lymphangitic and pleural carcinamatosis. However changes could be due to radiation pneumonitis. Primary infrahilar and lower lobe masses no longer seen. g.        CT chest 4/5/19 right lung interstital and airspace opacities with interlobular septal thickening and small nodular densities which are relatively stable since her previous CT in December 2018. Small left pulmonary nodules, unchanged.   h.        Pending bronchoscopy with possible debridement, May 2019. On hold due to clinical improvement. i.         CT chest 10/1/19 Right hilar consolidation appears more confluent. Left greater than right axillary lymphadenopathy. 3.        Type 2 diabetes, now Insulin requiring. 4.        Hypertension. 5.        Hypovitaminosis D.  6.        Preserved organ function. Good performance status. Plan:  Ms. Alicia Woods is seen today with Dr. Mike Jaeger. Last set of heme parameters were reviewed as well as TSH and Cortisol. Restaging studies reviewed with the patient. Dr. Anamaria Tinoco followed scheduled 10/17/19. Continue treatment with Keytruda every 3 weeks.   She is going to let us know her endocrinologist so that we can obtain records   Absent any new development, she will come back and see me in late November         Problems:  Non-Hodgkin's lymphoma (disorder), Benign essential hypertension (disorder), Cough, Diabetes mellitus type II, Dyspnea on exertion, Lung mass, Lymphoproliferative disease, Monoclonal gammopathy and Squamous cell lung cancer ( Stage Date: Unknown, Stage IIIB (N1, M0)  Histopathologic Type: Squamous cell carcinoma; PD-L1: >= 50% Expression; ROS1 Gene: Negative; BRAF Mutation: Unknown; EGFR Expression: Negative; ALK (FISH): Negative; )    Health Maintenance:           Preventative Care & Screening:                   Tobacco history: Former smoker    Vital Signs:  Blood pressure: 124/82, L arm, Large, Pulse: 112, Temperature: 98 F, Respirations: 20, O2 sat: 96%, At Rest, Room Air, Pain Scale: 0, Height: 68 in, Weight: 189.4 lb, BSA: 2.03, BMI: 28.8 kg/z6Oybgu pressure: 124/82, L arm, Large, Pulse: 112, Temperature: 98 F, Respirations: 20, O2 sat: 96%, At Rest, Room Air, Pain Scale: 0, Height: 68 in, Weight: 189.4 lb, BSA: 2.03, BMI: 28.8 kg/m2  Laboratory:  CBC  (10/01/2019) WBC x 10^3/uL : 5.4; RBC x 10^6/uL : 4.10; NRBC, % : 0.00; HGB g/dL : 11.2 (L); HCT % : 33.8 (L); MCV fL : 82; MCH pg : 27.3; MCHC g/dL : 33.1; PLT x 10^3/uL : 240; MPV fL : 10.60; Alanis % : 66.6; LY % : 21.4; MO % : 7.2; EO % : 4.2; BA % : 0.4; IG % : 0.20; Alanis # (ANC) x 10^3/uL : 3.61; LY # x 10^3/uL : 1.16; MO # x 10^3/uL : 0.39; EO # x 10^3/uL : 0.23; BA # x 10^3/uL : 0.02  Chemistries  (09/10/2019) Glucose mg/dL : 482 Repeated (HH); BUN mg/dL : 10; Creatinine mg/dL : 0.75; Sodium mmol/L : 137; Potassium mmol/L : 3.9; Chloride mmol/L : 98; CO2 mmol/L : 30; Calcium mg/dL : 9.1; Albumin g/dL : 3.5; Total protein g/dL : 7.1; Bilirubin, total mg/dL : 0.4; Alkaline phosphatase U/L : 145; AST/SGOT U/L : 12;  ALT/SGPT U/L : 12; GFR estimate mL/min/1.73m2 : 93.70; Cortisol, total, serum ug/dL : 19.6  (04/01/2019) LDH U/L : 212 (H)  (10/29/2018) GFR non-, estimated mL/min/1.73m2 : 91.18        Allergies & Adverse Reactions:  losartan           Problem List:  Non-Hodgkin's lymphoma (disorder)  Benign essential hypertension (disorder)  Cough  Diabetes mellitus type II  Dyspnea on exertion  Lung mass  Lymphoproliferative disease  Monoclonal gammopathy  Squamous cell lung cancer ( Stage Date: Unknown, Stage IIIB (N1, M0) )    New Orders:       10/08/2019, RTC MD/Lab, Perform Date: 11/26/2019, Associated problem(s): Non-Hodgkin's lymphoma (disorder) *  10/08/2019, RTC nurse for chemo, Perform Date: 10/22/2019, Associated problem(s): Non-Hodgkin's lymphoma (disorder) *  10/08/2019, RTC nurse for chemo, Perform Date: 11/12/2019, Associated problem(s): Non-Hodgkin's lymphoma (disorder) *       10/08/2019, CBC w/ auto diff, Perform Date: 10/22/2019, Associated problem(s): Non-Hodgkin's lymphoma (disorder) *  10/08/2019, CBC w/ auto diff, Perform Date: 11/26/2019, Associated problem(s): Non-Hodgkin's lymphoma (disorder) *  10/08/2019, CMP, Perform Date: 10/22/2019, Associated problem(s): Non-Hodgkin's lymphoma (disorder) *  10/08/2019, CMP, Perform Date: 11/26/2019, Associated problem(s): Non-Hodgkin's lymphoma (disorder) *  10/08/2019, Cortisol, total, serum, Perform Date: 10/22/2019, Associated problem(s): Non-Hodgkin's lymphoma (disorder) *  10/08/2019, TSH, Perform Date: 10/22/2019, Associated problem(s): Non-Hodgkin's lymphoma (disorder) *           Sara Nathan NP      Send copy of note to:  MD Grace Ferrara MD Buzz Lowing, MD Casper [place], MD                  Electronically signed by Evelio Dow MD 10/27/2019 12:17 EDT

## 2019-11-27 NOTE — PROGRESS NOTES
Problem: Unstable angina/NSTEMI: Day of Admission/Day 1  Goal: Activity/Safety  Outcome: Progressing Towards Goal  Goal: Consults, if ordered  Outcome: Progressing Towards Goal  Goal: Diagnostic Test/Procedures  Outcome: Progressing Towards Goal  Goal: Nutrition/Diet  Outcome: Progressing Towards Goal  Goal: Discharge Planning  Outcome: Progressing Towards Goal  Goal: Medications  Outcome: Progressing Towards Goal  Goal: Respiratory  Outcome: Progressing Towards Goal  Goal: Treatments/Interventions/Procedures  Outcome: Progressing Towards Goal  Goal: Psychosocial  Outcome: Progressing Towards Goal  Goal: *Hemodynamically stable  Outcome: Progressing Towards Goal  Goal: *Optimal pain control at patient's stated goal  Outcome: Progressing Towards Goal  Goal: *Lungs clear or at baseline  Outcome: Progressing Towards Goal     Problem: Chronic Obstructive Pulmonary Disease (COPD)  Goal: *Oxygen saturation during activity within specified parameters  Outcome: Progressing Towards Goal  Goal: *Able to remain out of bed as prescribed  Outcome: Progressing Towards Goal  Goal: *Absence of hypoxia  Outcome: Progressing Towards Goal  Goal: *Optimize nutritional status  Outcome: Progressing Towards Goal     Problem: Patient Education: Go to Patient Education Activity  Goal: Patient/Family Education  Outcome: Progressing Towards Goal     Problem: Pain  Goal: *Control of Pain  Outcome: Progressing Towards Goal     Problem: Patient Education: Go to Patient Education Activity  Goal: Patient/Family Education  Outcome: Progressing Towards Goal     Problem: Falls - Risk of  Goal: *Absence of Falls  Description  Document Nayely Fall Risk and appropriate interventions in the flowsheet.   Outcome: Progressing Towards Goal  Note: Fall Risk Interventions:            Medication Interventions: Teach patient to arise slowly, Patient to call before getting OOB         History of Falls Interventions: Door open when patient unattended, Room close to nurse's station         Problem: Patient Education: Go to Patient Education Activity  Goal: Patient/Family Education  Outcome: Progressing Towards Goal     Problem: Hypertension  Goal: *Blood pressure within specified parameters  Outcome: Progressing Towards Goal  Goal: *Fluid volume balance  Outcome: Progressing Towards Goal  Goal: *Labs within defined limits  Outcome: Progressing Towards Goal     Problem: Patient Education: Go to Patient Education Activity  Goal: Patient/Family Education  Outcome: Progressing Towards Goal

## 2019-11-27 NOTE — PROGRESS NOTES
Problem: Discharge Planning  Goal: *Discharge to safe environment  Outcome: Resolved/Met     Plan: home    Reason for Admission:   NSTEMI                  RRAT Score: 19                 Do you (patient/family) have any concerns for transition/discharge? Not @ this time. Plan for utilizing home health:   Qualifies for [facility], will need orders. Current Advanced Directive/Advance Care Plan:   none            Transition of Care Plan:   Home with spouse & out-pt follow up. Chart reviewed. Met with pt., verified all demographics. [facility] has [facility] ins. [facility] Dr. Dustin Kumar is her PCP, last seen 11/25/19. NOK: Angelita Carter, spouse, with whom she lives with & he will pick her up @ discharge. Uses no DME. Independent with ADL's prior to admit. Will cont to follow for any needs. Enma SantanaRN,ext [phone]. Patient has designated her spouse to participate in his/her discharge plan and to receive any needed information. Name: Angelita Carter  Address:  Phone number:  [phone]/ [phone]    Care Management Interventions  PCP Verified by CM: Yes(Dr. Dustin Kumar)  Last Visit to PCP: 11/25/19  Palliative Care Criteria Met (RRAT>21 & CHF Dx)?: No  Mode of Transport at Discharge:  Other (see comment)(family)  Transition of Care Consult (CM Consult): Discharge Planning  Discharge Durable Medical Equipment: No  Physical Therapy Consult: No  Occupational Therapy Consult: No  Speech Therapy Consult: No  Current Support Network: Lives with Spouse  Confirm Follow Up Transport: Family  Plan discussed with Pt/Family/Caregiver: Yes  Discharge Location  Discharge Placement: Home

## 2019-11-27 NOTE — DIABETES MGMT
NUTRITIONAL ASSESSMENT GLYCEMIC CONTROL/ PLAN OF CARE     Tavo Olguin           75 y.o.           11/26/2019                 1. Non-STEMI (non-ST elevated myocardial infarction) ([address]    2. SOB (shortness of breath)    3. Bilateral lower extremity edema    4. History of COPD    5. History of lung cancer    T2DM   INTERVENTIONS/PLAN:   1. Diet education on consistent CHO meal planning. Pt advised to attend outpatient DM education classes at [facility]. 2.  Suggest increasing Lantus to 24 units/day  3.  2472-3030 calorie consistent CHO diet to promote glycemic control. ASSESSMENT:   Nutritional Status:  Pt is 147% ideal weight. Pt appears well nourished and po intake is adequate but she is at nutrition risk due to recent CA treatments. Nutrition Diagnoses: Altered nutrition related labs due to T2DM as evidenced by A1C of 10.1%. Obesity due to excess energy intake as evidenced by BMI of 30.6 kg. Food knowledge deficit due to lack of DM diet education as evidenced by pt consuming sweetened beverages on a daily basis. Diabetes Management:   Pt reports her last A1C was done about 2 months ago and it was \"11 something\". She states her insulins were adjusted at that time as noted below. She has a [facility] blood glucose monitoring system and checks her BG \"all the time\". Her recent usual fasting BG readings have been in the 200's. Upon educating pt on CHO counting she is able to provide teach back on meal planning strategies for glycemic control. See DM education record for details.       Recent blood glucose:     11/27:  POC - 208 (lab - 164)  11/26:  POC - 253 (lab - 314)  Within target range (non-ICU: <140; ICU<180): [] Yes   [x]  No    Current Insulin regimen:   Lantus 20 units/day  Corrective lispro, very insulin resistant dosing ACHS  Home medication/insulin regimen: per pt and updated in PTA medications list:  Lantus 20 units/day  Humalog 75/25 10 units TID at meals  HbA1c: 10.2% - ave BG ~ 243 mg/dL over past 3 months. Adequate glycemic control PTA:  [] Yes  [x] No     SUBJECTIVE/OBJECTIVE:   Information obtained from: chart review, pt  Pt is s/p chemo and radiation treatments for lung CA (treatments ended 10/2019). She states her appetite has been very good and she maintained her weight during CA treatments. She denies having food allergies and does not have issues with chewing or swallowing. She regularly consumes sweetened beverages (regular sodas, sweet tea and she adds 5 teaspoons sugar to her coffee). Diet: consistent CHO     Patient Vitals for the past 100 hrs:   % Diet Eaten   11/26/19 2310 100 %       Medications: [x]                Reviewed   Includes:  Lasix, Lipitor    Most Recent POC Glucose:   Recent Labs     11/27/19  0538 11/26/19  1605   * 314*        Labs:   Lab Results   Component Value Date/Time    Hemoglobin A1c 10.1 (H) 11/26/2019 04:05 PM     Lab Results   Component Value Date/Time    Sodium 137 11/27/2019 05:38 AM    Potassium 3.7 11/27/2019 05:38 AM    Chloride 103 11/27/2019 05:38 AM    CO2 29 11/27/2019 05:38 AM    Anion gap 5 11/27/2019 05:38 AM    Glucose 164 (H) 11/27/2019 05:38 AM    BUN 13 11/27/2019 05:38 AM    Creatinine 0.67 11/27/2019 05:38 AM    Calcium 9.2 11/27/2019 05:38 AM    Albumin 2.5 (L) 11/27/2019 05:38 AM       Anthropometrics: IBW : 59 kg (130 lb), % IBW (Calculated): 145.77 %, BMI (calculated): 30.6  Wt Readings from Last 1 Encounters:   11/26/19 86 kg (189 lb 8 oz)      Ht Readings from Last 1 Encounters:   11/26/19 5' 6\" (1.676 m)     Estimated Nutrition Needs:  1703 Kcals/day, Protein (g): 77 g Fluid (ml): 1700 ml  Based on:   [x]          Actual BW    []          ABW   []            Adjusted BW         Nutrition Interventions:  DM diet education  5583-0959 calorie consistent CHO diet to promote glycemic and weight control. Goal:   Blood glucose will be within target range of  mg/dL by by 11/30/19.   Weight maintenance (+/- 1-2 kg) by 12/7/19.      Nutrition Monitoring and Evaluation      [x]     Monitor po intake on meal rounds  [x]     Continue inpatient monitoring and intervention  []     Other:    Nutrition Risk:  []   High     [x]  Moderate    []  Minimal/Uncompromised    Wenceslao Ayala RD, CDE   Office:  [address] [place] [address] Pager:  [phone]

## 2019-11-27 NOTE — PROGRESS NOTES
conducted an initial consultation and Spiritual Assessment for Maria Esther Vela, who is a 75 y.o.,female. According to the patients chart Anabaptism Affiliation is: Non Tenriism.     The reason the Patient came to the hospital is:   Patient Active Problem List    Diagnosis Date Noted    Pneumonitis 11/26/2019    Arterial occlusion 11/26/2019    NSTEMI (non-ST elevated myocardial infarction) ([facility] 11/26/2019    COPD (chronic obstructive pulmonary disease) ([facility] 11/26/2019    Diabetes mellitus, type 2 ([facility] 11/26/2019    HTN (hypertension) 11/26/2019    Occlusion of artery 11/26/2019    Occlusion of vein 11/26/2019        The  provided the following Interventions:  Initiated a relationship of care and support. Explored issues of mariana, belief, spirituality and Bahai/ritual needs while hospitalized. Listened empathically. Provided information about Spiritual Care Services. Offered prayer and assurance of continued prayers on patients behalf. Chart reviewed. The following outcomes were achieved:  Patient shared limited information about their medical narrative, spiritual journey and beliefs. Patient processed feelings about current hospitalization. Patient expressed gratitude for Spiritual Care visit. Assessment:  There are no significant spiritual or Bahai issues which require further intervention at this time. Patient does not have any Bahai or cultural needs that will affect patients preferences in health care. Plan:  Chaplains will continue to follow and will provide pastoral care as needed or requested.  recommends bedside caregivers page  on duty if patient shows signs of acute spiritual or emotional distress. [address] [facility] Erika Delgado M.Div.   [address] [place] [address] Drive  [phone] - Office

## 2019-11-27 NOTE — PROGRESS NOTES
Problem: Chronic Obstructive Pulmonary Disease (COPD)  Goal: *Oxygen saturation during activity within specified parameters  Outcome: Progressing Towards Goal   Respiratory Therapy Assessment Care Plan    Patient:  Wendie Payton 75 y.o. female 11/27/2019 8:01 AM    NSTEMI (non-ST elevated myocardial infarction) ([address] [I21.4]  Pneumonitis [J18.9]  Occlusion of artery [I70.90]  Occlusion of vein [I82.90]      Chest X-RAY:   Results from Hospital Encounter encounter on 04/24/19   XR CHEST PORT    Impression IMPRESSION:    No significant interval change given differences in technique. Continued  ill-defined right hilar and lower lobe airspace process, which is nonspecific  but given history may represent sequela of prior right lung cancer with  radiation therapy. Superimposed pneumonia is possible, although no definite  change is seen. Results from [place] encounter on 12/30/18   XR CHEST PA LAT    Impression IMPRESSION:    1. Shallow inspiration with interval increase of right lower lobe airspace  process. Follow-up is recommended. Patient has known history of neoplasm with  possible radiation treatment. Results from Hospital Encounter encounter on 10/31/18   XR CHEST PA LAT    Impression IMPRESSION:    Patchy alveolar infiltrates in the mid to inferior right lung. Vital Signs:   Visit Vitals  /66 (BP 1 Location: Left arm, BP Patient Position: At rest)   Pulse 93   Temp 99.4 °F (37.4 °C)   Resp 20   Ht 5' 6\" (1.676 m)   Wt 86 kg (189 lb 8 oz)   SpO2 98%   Breastfeeding No   BMI 30.59 kg/m²         Indications for treatment: COPD      Plan of care:Scheduled nebs.         Goal: improve WOB

## 2019-11-27 NOTE — ROUTINE PROCESS
2127:TRANSFER - IN REPORT:    Verbal report received from [facility] RN(name) on Lita Quintana  being received from ER(unit) for routine progression of care      Report consisted of patients Situation, Background, Assessment and   Recommendations(SBAR). Information from the following report(s) SBAR, Kardex, Intake/Output, MAR and Recent Results was reviewed with the receiving nurse. Opportunity for questions and clarification was provided. 2151: Received patient from ER via wheelchair. AAOX4. No SOB on oxygen at 2 LPM. Denies any pain or discomfort at this time. Oriented patient to room & surroundings. Call light within reach. Family at bedside. V/s taken. Tele box #6 applied on. ST w/ PVC's as initial rhythm. Assessment completed upon patients arrival to unit and care assumed. 2210: Patient c/o haven't eat yet. TV dinner provided. 2310: Due meds given. Heparin gtt started per AMI/ACS protocol. 2322: Per patient she check her blood sugar & on insulin. Paged Dr. Fernando Vanessa. Informed. Will put order in.     2353: Due meds given. 0013: No change from previous assessment. 0300: Sleeping.    0425: No change from previous assessment. 9892: Slept good thru night. Needs attended. Due med given. 0730: Bedside and Verbal shift change report given to [facility] (oncoming nurse) by me  (offgoing nurse). Report included the following information SBAR, Kardex, Intake/Output, MAR, Recent Results and Cardiac Rhythm SR. Heparin gtt rate adjusted per protocol. Next APTT due at 1330.

## 2019-11-27 NOTE — PROGRESS NOTES
Pharmacy Dosing Services: Vancomycin    Indication: CAP    Day of therapy: 1    Other Antimicrobials (Include dose, start day & day of therapy):  Pip/tazo 3.375 grams IV every 8 hours extended infusion    Loading dose (date given): 2000 mg  Current Maintenance dose: None at this time    Goal Vancomycin Level: 15-20  (Trough 15-20 for most infections, 20 for meningitis/osteomyelitis, pre-HD level ~25)    Vancomycin Level (if drawn): None at this time     Significant Cultures: pending    Renal function stable? (unstable defined as SCr increase of 0.5 mg/dL or > 50% increase from baseline, whichever is greater) (Y/N): N     CAPD, Hemodialysis or Renal Replacement Therapy (Y/N): N     Recent Labs     19  0538 19  1605   CREA 0.67 0.87   BUN 13 15   WBC 8.9 10.0     Temp (24hrs), Av.3 °F (36.8 °C), Min:98 °F (36.7 °C), Max:99.4 °F (37.4 °C)    Creatinine Clearance (Creatinine Clearance (ml/min)): 80-90 ml/min     Regimen assessment: New start  Maintenance dose: 1250 mg IV every 12 hours  Next scheduled level: Trough on  at 1230       Pharmacy will follow daily and adjust medications as appropriate for renal function and/or serum levels.     Thank you,  Rose Mary Julien, PHARMD

## 2019-11-27 NOTE — DIABETES MGMT
Diabetes Patient/Family Education Record  Factors That  May Influence Patients Ability  to Learn or  Comply with Recommendations   []   Language barrier    []   Cultural needs   []   Motivation    []   Cognitive limitation    []   Physical   []   Education    []   Physiological factors   []   Hearing/vision/speaking impairment   []   Gnosticist beliefs    []   Financial factors   [x]  Other: frequent interruptions   []  No factors identified at this time. Person Instructed:   []   Patient   []   Family   []  Other     Preference for Learning:   [x]   Verbal   [x]   Written  CHO and meal planning  Plate method handout  5 days menus for 1500 calorie diet  Grocery shopping list   []  Demonstration     Level of Comprehension & Competence:    []  Good                                      [x] Fair                                     []  Poor                             [x]  Needs Reinforcement   [x]  Teachback completed    Education Component:   [x]  Medication management, including how to administer insulin (if appropriate) and potential medication interactions    [x]  Nutritional management -obtain usual meal pattern- advised to avoid sweetened beverages and reviewed appropriate alternatives.    []  Exercise   [x]  Signs, symptoms, and treatment of hyperglycemia and hypoglycemia   [] Prevention, recognition and treatment of hyperglycemia and hypoglycemia   [x]  Importance of blood glucose monitoring and how to obtain a blood glucose meter - has a [employer] and checks her BG \"all the time\"   []  Instruction on use of the blood glucose meter   [x]  Discuss the importance of HbA1C monitoring    []  Sick day guidelines   []  Proper use and disposal of lancets, needles, syringes or insulin pens (if appropriate)   []  Potential long-term complications (retinopathy, kidney disease, neuropathy, foot care)   [x] Information about whom to contact in case of emergency or for more information [x]  Goal:  Patient/family will demonstrate understanding of Diabetes Self Management Skills by: (date) ___12/4/19____  Plan for post-discharge education or self-management support:    [x] Outpatient class schedule provided            [] Patient Declined    [] Scheduled for outpatient classes (date) _______  Verify:  Does patient understand how diabetes medications work? __no - discussed her insulins__________________________  Does patient know what their most recent A1c is? ____yes_______________________________  Does patient monitor glucose at home? _________Yes__________________________________  Does patient have difficulty obtaining diabetes medications or testing supplies? __________she finds her co-payments for insulins are alot_______       Keiko Terrell RD, CDE   Office:  [address] [place] [address] Pager:  [phone]

## 2019-11-27 NOTE — H&P
History and Physical    Patient: Maria Esther Vela MRN: [id number]  SSN: [id number]    YOB: 1955  Age: 75 y.o. Sex: female      Subjective:      Maria Esther Vela is a 75 y.o. -american female who presents to [facility] with complaint of Shortness of Breath. Patient states that she has been having worsening shortness of breath over the last 2 weeks that she had been treating with Nebulized breathing treatments. Patient states that her breathing improved over the last 3 days. However, Patient went to see her Oncologist and was referred to [facility] ER when she mentioned that she had been having an elevated heart rate and shortness of breath. Patient reports that she was diagnosed with Lung Cancer and completed her Chemotherapy and Radiation Treatments in 10/2019. Patient also complains of increased anxiety with some nausea and vomiting in the AM for the last few weeks. Patient reports that she has been having difficult breathing when she sleeps laying flat and that her legs have been having increasing swelling over the last 5 days. Patient also reports worsening exertional dyspnea over greater than 30 days, but denies chest pain/pressure. Patient also remarks that her right leg is more swollen than her left and reports that she had a Fall approximately 1 month ago. Notably, Patient's Lung Cancer was located on her Left Side. CTA performed in [facility] ER showed new occlusion of Right Lower Lobe Artery and Right Inferior Pulmonary Vein. Likewise, Left Lung appears to have increased consolidation consistent with Post-Obstructive PNA or Radiation Pneumonitis. Concerns for mass effect causing both issues. Troponin noted to be 3.17 in [facility] ER. Patient is admitted for management of NSTEMI and further evaluation of possible return of Lung Tumor with compression of vasculature and possible Post-Obstructive Pneumonia.     Past Medical History:   Diagnosis Date    [facility]) [facility]     Diabetes mellitus ([facility]     Emphysema of lung ([facility]     Hypertension      Past Surgical History:   Procedure Laterality Date    ABDOMEN SURGERY PROC UNLISTED      HX APPENDECTOMY      HX TONSILLECTOMY        History reviewed. No pertinent family history. Social History     Tobacco Use    Smoking status: Former Smoker     Years: 24.00     Last attempt to quit: 1978     Years since quittin.4    Smokeless tobacco: Never Used   Substance Use Topics    Alcohol use: No      Patient lives at home with her  and is up ad meghan at home. Patient is a Former Smoker who Smoked 1 PPD x24 years; Quit 1978. Patient reports that she Formerly consumed ETOH, but Quit approximately . Patient denies Vaping and Illicit Drug Use. Prior to Admission medications    Medication Sig Start Date End Date Taking? Authorizing Provider   revefenacin (YUPELRI) 175 mcg/3 mL nebu nebulizer solution 175 mcg by Nebulization route daily. Yes Matheus, MD Vick   OTHER    Yes Vick Jarvis MD   budesonide (PULMICORT) 0.25 mg/2 mL nbsp by Nebulization route. Yes Matheus, MD Vick   furosemide (LASIX) 20 mg tablet Take 20 mg by mouth daily. Yes Matheus, MD Vick   potassium chloride SR (KLOR-CON 10) 10 mEq tablet Take  by mouth. Yes Vick Jarvis MD   predniSONE (DELTASONE) 5 mg tablet Take 5 mg by mouth. Yes Matheus, MD Vick   inhalational spacing device 1 Each by Does Not Apply route as needed. 18  Yes La CHAMBRES DO   amLODIPine (NORVASC) 5 mg tablet Take 5 mg by mouth daily. Yes Provider, Historical   insulin lispro protamin/lispro (HUMALOG MIX 75-25 KWIKPEN SC) 50 Units by SubCUTAneous route two (2) times a day.    Yes Provider, Historical        No Known Allergies    Review of Systems:  (+) Chest Congestion  (+) Cough  (+) Shortness of Breath  (+) Orthopnea  (+) BLE Edema  (+) Dyspnea on Exertion  (+) Nausea  (+) Vomiting  (+) Stool Abnormality  (-) Fevers  (-) Chills  (-) Pain with Inspiration  (-) Chest Pain  (-) Abdominal Pain  (-) Diarrhea  (-) Constipation  (-) Dysuria  All other systems have been reviewed and are negative      Objective:     Vitals:    11/26/19 2015 11/26/19 2030 11/26/19 2045 11/26/19 2100   BP: 139/68 134/66 140/67 145/67   Pulse: (!) 103 (!) 103 (!) 104 (!) 101   Resp:    18   Temp:    98 °F (36.7 °C)   SpO2: 100% 100% 98% 99%   Weight:       Height:            Physical Exam:  General:  Adult -american female lying in bed in no acute distress  HEENT:  Atraumatic, normocephalic; Pupils equally round and reactive to light with accommodation; (+) Bilateral Pseudophakia; Extraocular muscles intact; Moist Oropharynx without erythema, edema, or exudates  Neck:  No Bruits; No Lymphadenopathy  Chest:  No pectus carinatum; No pectus excavatum  Cardiovascular:  Regular rate and rhythm without rubs, gallops, or murmurs  Respiratory:  (+) Mildly reduced lung sounds over Right Mid-to-lower lung-fields; (+) Mild Crackles over Left lower lung-field; Clear to Auscultation Bilaterally without wheezes, rales, or rhonchi; normal effort of breathing  Abdominal:  Soft, non-tense, non-tender abdomen; BS present without guarding, rebound, or masses  :  Deferred  Extremities:  Pulses 2+ x4 with (+) pitting edema to Right Mid-thigh and (+) 1+ pitting edema to Left Mid-tibia without clubbing or cyanosis  Musculoskeletal:  Strength 5/5 and symmetrical in BUE and BLE  Integument:  No rash on face, forearms, or legs  Neurological:  A&O x4/4; No gross deficits of Visual Acuity, Eye Movement, Jaw Opening, Facial Expression, Hearing, Phonation, or Head Movement;  No gross deficits of Tongue Movement or Slurring of Speech  Psychiatric:  Affect, Language, and Behavior are appropriate      Assessment:     Hospital Problems  Date Reviewed: 11/26/2019          Codes Class Noted POA    * (Principal) NSTEMI (non-ST elevated myocardial infarction) ([facility] ICD-10-CM: I21.4  ICD-9-CM: 410.70 11/26/2019 Yes        Arterial occlusion ICD-10-CM: I70.90  ICD-9-CM: 444.9  11/26/2019 Yes    Overview Signed 11/26/2019  7:29 PM by Solomon Davila DO     Right Lower Lobe Artery Occlusion and Right Inferior Pulmonary Vein Occlusion possibly due to compression by Lung Mass. Pneumonitis ICD-10-CM: J18.9  ICD-9-CM: 807  11/26/2019 Yes    Overview Signed 11/26/2019  7:28 PM by Solomon Davila DO     Post-Obstructive Pneumonia vs. Radiation Pneumonitis             COPD (chronic obstructive pulmonary disease) ([facility] ICD-10-CM: J44.9  ICD-9-CM: 356  11/26/2019 Yes        Diabetes mellitus, type 2 ([facility] ICD-10-CM: E11.9  ICD-9-CM: 250.00  11/26/2019 Yes        HTN (hypertension) ICD-10-CM: I10  ICD-9-CM: 401.9  11/26/2019 Yes        Occlusion of artery ICD-10-CM: I70.90  ICD-9-CM: 444.9  11/26/2019 Unknown        Occlusion of vein ICD-10-CM: I82.90  ICD-9-CM: 453.9  11/26/2019 Unknown              Plan:     ASA, Beta-Blocker, Statin, Clopidogrel, ACEI, and IV Heparin gtt x48 hours. Trend serial troponins with Serial EKGs. Telemetry. Echocardiogram.  Lipid Panel, HBA1c, Thyroid Panel. Diabetes education. IV Vancomycin, IV Zosyn for possible Post-Obstructive PNA. Sputum ordered, but unlikely to be obtained. Consider Consulting Pulmonology for Bronchoscopy, if necessary. Consider consulting Oncology for possible return of Lung Cancer. Continue home medications for COPD and Hypokalemia. PRN Duonebs. Serum Glucose measurements qACHS with Corrective Insulin and give Long-Acting Insulin 20 units qHS (replacing home 75/25 Mix BID). This may need adjustment. DVT prophylaxis achieved by IV Heparin gtt.       Signed By: Gabino Jacome DO     November 26, 2019

## 2019-11-27 NOTE — CONSULTS
[facility] - Consult Note    Date of  Admission: 11/26/2019  3:30 PM   Primary Care Physician:  Lyndsey Hernandez MD      I saw, evaluated, interviewed and examined the patient personally. I agree with the findings and plan of care as documented below with PA-C note  Patient admitted to hospital after couple weeks of dyspnea which improved with inhaler treatment. Patient was found to have a edema and elevated d-dimer so patient was sent to emergency department for CT chest which was negative however patient was found to have right lower lobe pulmonary artery occlusion as well as right pulmonary vein occlusion, likely from recurrence of cancer. Incidental finding of elevated troponin. Underlying CAD cannot be completely excluded. IV heparin if no contraindication  Aspirin, beta-blocker, statin  Patient with likely malignant pleural effusion  Would recommend inpatient heme-onc consultation. Suspect patient may need either diagnostic thoracentesis and further work-up for recurrence of malignancy. Discussed with patient about management for possible CAD. Medical management versus invasive or noninvasive risk stratification discussed including cath. Because of possibility of recurrence of cancer, decided for medical management for now until further heme-onc recommendation. Echo  IV heparin  IV Lasix      Lashawn Larose MD        Assessment:     - NSTEMI: Troponin 3.17-->2.67---> 2.54, presented with shortness of breath but no chest pain  -  Abnormal chest CT: \"There is now new occlusion of the right lower lobe pulmonary artery and right inferior pulmonary vein. This is suggestive of encasement by tumor or stricturing from radiation changes, less likely due to pulmonary embolism. \"  - Presented initially with elevated D-Dimer, CT was negative for PE but abnormal findings as above  - Likely malignant pleural effusion on CT chest   - Lung cancer  - Hypertension  - Diabetes Mellitus, HgbA1C 10.1  - Former tobacco use  - COPD     Plan:     - Patient has been started on medical management for NSTEMI: ASA, statin, Lisinopril, Lopressor, Heparin gtt infusing. It appears she was on PO Lasix and Amlodipine in PTA med list  - Will give some IV Lasix for volume overload  - Echo pending this admission  - Patient with history of lung cancer, but with new abnormal findings of CT chest, would recommend heme-onc consult to discuss if this changes her treatment plan moving forward in consideration for cardiac cath, as stent placement and dual antiplatelet therapy (if needed) could affect the treatment plan moving forward. Right now she is comfortable and pain free, so we will pursue medical management. Plan will be adjusted accordingly pending hospital course. History of Present Illness: This is a 75 y.o. female admitted for NSTEMI (non-ST elevated myocardial infarction) ([facility] [I21.4]  Pneumonitis [J18.9]  Occlusion of artery [I70.90]  Occlusion of vein [I82.90]. Patient complains of:  Shortness of breath    This is a 75year old female with a past medical history as outlined above that cardiology is seeing in consult due to NSTEMI. She presented to the ED yesterday under the recommendation of her primary care provider due to elevated D-dimer. Her CT chest was negative but there was a new abnormal finding of new occlusion of the right lower lobe pulmonary artery and right inferior pulmonary vein. The patient states that she has felt more short of breath over the last two weeks and has had bilateral LE edema, she also has had orthopnea. She has not had any chest pain. Denies palpitations, PND or syncope. She has not CAD or CHF history. She is a former smoker and quit 15 years ago. Has received radiation therapy and chemotherapy for lung CA but not surgery.         Cardiac risk factors: smoking/ tobacco exposure, hypertension      Review of Symptoms:  Except as stated above include:  Constitutional: negative  Respiratory: +dyspnea  Cardiovascular:  Per HPI  Gastrointestinal: negative  Genitourinary:  negative  Musculoskeletal:  Negative  Neurological:  Negative  Dermatological:  Negative  Endocrinological: Negative  Psychological:  Negative    A comprehensive review of systems was negative except for that written in the HPI. Past Medical History:     Past Medical History:   Diagnosis Date    COPD [facility]chronic obstructive [facility] disease) ([facility]     [facility]     Emphysema of lung ([facility]     Hypertension          Social History:     Social History     Socioeconomic History    Marital status:      Spouse name: Not on file    Number of children: Not on file    Years of education: Not on file    Highest education level: Not on file   Tobacco Use    Smoking status: Former Smoker     Years: 24.00     Last attempt to quit: 1978     Years since quittin.4    Smokeless tobacco: Never Used   Substance and Sexual Activity    Alcohol use: No    Drug use: No        Family History:   History reviewed. No pertinent family history.      Medications:   No Known Allergies     Current Facility-Administered Medications   Medication Dose Route Frequency    insulin lispro (HUMALOG) injection   SubCUTAneous QID AFTER MEALS    piperacillin-tazobactam (ZOSYN) 3.375 g in 0.9% sodium chloride (MBP/ADV) 100 mL MBP  3.375 g IntraVENous ONCE    Followed by    piperacillin-tazobactam (ZOSYN) 3.375 g in 0.9% sodium chloride (MBP/ADV) 100 mL MBP ##4-HOUR EXTENDED INFUSION##  3.375 g IntraVENous Q8H    VANCOMYCIN INFORMATION NOTE   Other Rx Dosing/Monitoring    vancomycin (VANCOCIN) 2000 mg in  ml infusion  2,000 mg IntraVENous ONCE    insulin glargine (LANTUS) injection 24 Units  24 Units SubCUTAneous QHS    revefenacin (YUPELRI) nebulizer solution 175 mcg    175 mcg Nebulization DAILY    budesonide (PULMICORT) 500 mcg/2 ml nebulizer suspension  500 mcg Nebulization BID RT    furosemide (LASIX) tablet 20 mg  20 mg Oral DAILY    potassium chloride SR (KLOR-CON 10) tablet 10 mEq  10 mEq Oral DAILY    ondansetron (ZOFRAN) injection 4 mg  4 mg IntraVENous Q4H PRN    sodium chloride (NS) flush 5-40 mL  5-40 mL IntraVENous Q8H    sodium chloride (NS) flush 5-40 mL  5-40 mL IntraVENous PRN    magnesium hydroxide (MILK OF MAGNESIA) 400 mg/5 mL oral suspension 30 mL  30 mL Oral DAILY PRN    docusate sodium (COLACE) capsule 100 mg  100 mg Oral BID    metoprolol tartrate (LOPRESSOR) tablet 25 mg  25 mg Oral Q6H    heparin 25,000 units in D5W 250 ml infusion  11-25 Units/kg/hr IntraVENous TITRATE    aspirin chewable tablet 81 mg  81 mg Oral DAILY    atorvastatin (LIPITOR) tablet 20 mg  20 mg Oral QHS    lisinopril (PRINIVIL, ZESTRIL) tablet 5 mg  5 mg Oral DAILY    glucose chewable tablet 16 g  4 Tab Oral PRN    glucagon (GLUCAGEN) injection 1 mg  1 mg IntraMUSCular PRN    dextrose 10 % infusion 125-250 mL  125-250 mL IntraVENous PRN    albuterol-ipratropium (DUO-NEB) 2.5 MG-0.5 MG/3 ML  3 mL Nebulization Q6H PRN         Physical Exam:     Visit Vitals  /81 (BP 1 Location: Left arm, BP Patient Position: At rest)   Pulse (!) 104   Temp 98.1 °F (36.7 °C)   Resp 20   Ht 5' 6\" (1.676 m)   Wt 189 lb 8 oz (86 kg)   SpO2 98%   Breastfeeding No   BMI 30.59 kg/m²     BP Readings from Last 3 Encounters:   11/27/19 123/81   05/20/19 180/88   04/24/19 177/72     Pulse Readings from Last 3 Encounters:   11/27/19 (!) 104   05/20/19 87   04/24/19 97     Wt Readings from Last 3 Encounters:   11/26/19 189 lb 8 oz (86 kg)   04/24/19 198 lb (89.8 kg)   01/24/19 284 lb (128.8 kg)       General:  alert, cooperative, no distress  Neck:  nontender, no JVD  Lungs:  +decreased breath sounds at bases  Heart:  regular rate and rhythm, S1, S2 normal, no murmur, click, rub or gallop  Abdomen:  abdomen is soft without significant tenderness, masses, organomegaly or guarding  Extremities:  edema 2+ bilateral LE's  Skin: Warm and dry. no hyperpigmentation, vitiligo, or suspicious lesions  Neuro: alert, oriented x3, affect appropriate  Psych: non focal     Data Review:     Recent Labs     11/27/19  0538 11/26/19  1605   WBC 8.9 10.0   HGB 11.7* 11.8*   HCT 37.2 36.6    396     Recent Labs     11/27/19  0538 11/26/19  1605    135*   K 3.7 4.3    102   CO2 29 27   * 314*   BUN 13 15   CREA 0.67 0.87   CA 9.2 9.2   ALB 2.5* 2.6*   SGOT 13 17   ALT 21 23   INR  --  1.0       Results for orders placed or performed during the hospital encounter of 11/26/19   EKG, 12 LEAD, INITIAL   Result Value Ref Range    Ventricular Rate 107 BPM    Atrial Rate 107 BPM    P-R Interval 148 ms    QRS Duration 78 ms    Q-T Interval 376 ms    QTC Calculation (Bezet) 501 ms    Calculated P Axis 52 degrees    Calculated R Axis 33 degrees    Calculated T Axis 86 degrees    Diagnosis       Sinus tachycardia  Minimal voltage criteria for LVH, may be normal variant ( Sokolow-Calderón )  Nonspecific T wave abnormality  Abnormal ECG  When compared with ECG of 24-APR-2019 16:03,  Nonspecific T wave abnormality now evident in Anterior leads  Confirmed by Damián Villalobos ([id number]) on 11/27/2019 8:07:16 AM         All Cardiac Markers in the last 24 hours:    Lab Results   Component Value Date/Time    CPK 55 11/26/2019 08:35 PM    CKMB 1.3 11/26/2019 08:35 PM    CKND1 2.4 11/26/2019 08:35 PM    TROIQ 2.54 ([facility]) 11/27/2019 05:38 AM    TROIQ 2.67 ([facility]) 11/26/2019 08:35 PM    TROIQ 3.17 ([facility]) 11/26/2019 04:05 PM       Last Lipid:    Lab Results   Component Value Date/Time    Cholesterol, total 206 (H) 11/27/2019 05:38 AM    HDL Cholesterol 68 (H) 11/27/2019 05:38 AM    LDL, calculated 109.8 (H) 11/27/2019 05:38 AM    Triglyceride 141 11/27/2019 05:38 AM    CHOL/HDL Ratio 3.0 11/27/2019 05:38 AM       Signed By: Onel Cardenas.  Prerna Mehta     November 27, 2019

## 2019-11-27 NOTE — PROGRESS NOTES
Internal Medicine Progress Note    Patient's Name: Asa Dunn  Admit Date: 11/26/2019  Length of Stay: 1      Assessment/Plan     Principal Problem:    NSTEMI (non-ST elevated myocardial infarction) ([facility] [address] (11/26/2019)    Active Problems:    Pneumonitis (11/26/2019)      Overview: Post-Obstructive Pneumonia vs. Radiation Pneumonitis      Arterial occlusion (11/26/2019)      Overview: Right Lower Lobe Artery Occlusion and Right Inferior Pulmonary Vein       Occlusion possibly due to compression by Lung Mass. COPD (chronic obstructive pulmonary disease) ([facility] [address] (11/26/2019)      Diabetes mellitus, type 2 ([facility] (11/26/2019)      HTN (hypertension) (11/26/2019)      Occlusion of artery (11/26/2019)      Occlusion of vein (11/26/2019)      NSTEMI  Cardiology consulted - medical vs invasive management  -follow cardiac enzymes  -spoke with Heme oncologist - no further interventions planned. Patient is to continue with palliative therapy as outpatient, Locally advanced, but controlled lung cancer. COPD  Does not appear to be in acute exacerbation  Breathing treatments as needed  Wears home oxygen as needed, wean hospital oxygen as tolerated     Postobstructive PNA  Continue abx    - Cont acceptable home medications for chronic conditions   - DVT protocol    I have personally reviewed all pertinent labs and films that have officially resulted over the last 24 hours. I have personally checked for all pending labs that are awaiting final results. Interval History   \"Tawanda Klein is a 75 y.o. -american female who presents to [facility] with complaint of Shortness of Breath. Patient states that she has been having worsening shortness of breath over the last 2 weeks that she had been treating with Nebulized breathing treatments. Patient states that her breathing improved over the last 3 days.   However, Patient went to see her Oncologist and was referred to [facility] ER when she mentioned that she had been having an elevated heart rate and shortness of breath. Patient reports that she was diagnosed with Lung Cancer and completed her Chemotherapy and Radiation Treatments in 10/2019. Patient also complains of increased anxiety with some nausea and vomiting in the AM for the last few weeks. Patient reports that she has been having difficult breathing when she sleeps laying flat and that her legs have been having increasing swelling over the last 5 days. Patient also reports worsening exertional dyspnea over greater than 30 days, but denies chest pain/pressure. Patient also remarks that her right leg is more swollen than her left and reports that she had a Fall approximately 1 month ago. Notably, Patient's Lung Cancer was located on her Left Side.     CTA performed in [facility] ER showed new occlusion of Right Lower Lobe Artery and Right Inferior Pulmonary Vein. Likewise, Left Lung appears to have increased consolidation consistent with Post-Obstructive PNA or Radiation Pneumonitis. Concerns for mass effect causing both issues. Troponin noted to be 3.17 in [facility] ER.     Patient is admitted for management of NSTEMI and further evaluation of possible return of Lung Tumor with compression of vasculature and possible Post-Obstructive Pneumonia. \"    Heme oncology consulted, Cardiology consulted. Subjective     Pt s/e @ bedside. No major events overnight. No complaints this morning, chest pain resolved. Oncologist is Dr. Maren Pickens.      Objective     Visit Vitals  /81 (BP 1 Location: Left arm, BP Patient Position: At rest)   Pulse (!) 104   Temp 98.1 °F (36.7 °C)   Resp 20   Ht 5' 6\" (1.676 m)   Wt 86 kg (189 lb 8 oz)   SpO2 98%   Breastfeeding No   BMI 30.59 kg/m²       Physical Exam:  General Appearance: NAD, conversant, appears chronically ill, NC in place   HENT: normocephalic/atraumatic, moist mucus membranes  Neck: No JVD, supple  Lungs: course breath sounds bilat, no resp distress  CV: tachycardia, no m/r/g  Abdomen: soft, non-tender, normal bowel sounds  Extremities: no cyanosis, no peripheral edema  Neuro: No focal deficits, motor/sensory intact    Intake and Output:  Current Shift:  No intake/output data recorded. Last three shifts:  11/25 1901 - 11/27 0700  In: 674.3 [P.O.:600; I.V.:74.3]  Out: -     Lab/Data Reviewed: All lab results for the last 24 hours reviewed. Imaging Reviewed:  Cta Chest W Or W Wo Cont    Result Date: 11/26/2019  EXAM: CTA chest INDICATION: Shortness of breath, right lung neoplasm COMPARISON: April 24, 2019 TECHNIQUE: Axial CT imaging from the thoracic inlet through the diaphragm with intravenous contrast. Coronal and sagittal MIP reformats were generated. One or more dose reduction techniques were used on this CT: automated exposure control, adjustment of the mAs and/or kVp according to patient size, and iterative reconstruction techniques. The specific techniques used on this CT exam have been documented in the patient's electronic medical record. Digital Imaging and Communications in Medicine (DICOM) format image data are available to nonaffiliated external healthcare facilities or entities on a secure, media free, reciprocally searchable basis with patient authorization for at least a 12-month period after this study. _______________ FINDINGS: EXAM QUALITY: Overall exam quality is satisfactory. Pulmonary arterial enhancement is adequate with adequate breath hold and no significant artifact. PULMONARY ARTERIES: There is no evidence of a central pulmonary embolism. There is now no flow seen in the right lower lobe pulmonary arteries. This may be secondary to encasement by tumor or post radiation changes less likely pulmonary embolism. This is a new finding from the prior exam. In addition the right inferior pulmonary vein also appears to be occluded. This is more suggestive of tumor involvement rather than pulmonary embolism.  LYMPH Nodes: No enlarged lymph nodes seen PLEURA: There is a moderate size right pleural effusion which is increased from prior exam possibly malignant. HEART: Cardiac size is enlarged. Small amount of pericardial effusion is seen. Moderate calcific coronary artery disease present. VASCULATURE/MEDIASTINUM: There is no aortic dissection. Small hiatal hernia is present. LUNGS: There is again seen right perihilar consolidation extending to the right middle and right lower lobes. There is worsening consolidation in the right lower lobe with very little aeration. Differential diagnoses includes post radiation changes versus recurrence of tumor with possible postobstructive pneumonia. Findings have progressed since the prior exam. Minimal patchy infiltrate seen in the left upper lobe. May represent pneumonitis. . AIRWAY: There is severe narrowing of the right lower lobe airways. This may be secondary to encasement. UPPER ABDOMEN: Visualized liver, spleen, kidneys, adrenals, are unremarkable. OTHER: No acute or aggressive osseous abnormalities identified. _______________     IMPRESSION: There is now new occlusion of the right lower lobe pulmonary artery and right inferior pulmonary vein. This is suggestive of encasement by tumor or stricturing from radiation changes, less likely due to pulmonary embolism. Dr. Hong Caraballo informed 6:20 PM November 26, 2019. There is increasing right pleural effusion likely malignant Increasing consolidation right lower lobe with narrowing of the bronchi with very little aeration to the right lower lobe. Again differential diagnoses includes recurrence of tumor and/or post radiation changes with postobstructive pneumonia. PET/CT imaging would be of help for further evaluation these findings.        Medications Reviewed:  Current Facility-Administered Medications   Medication Dose Route Frequency    insulin lispro (HUMALOG) injection   SubCUTAneous QID AFTER MEALS    piperacillin-tazobactam (ZOSYN) 3.375 g in 0.9% sodium chloride (MBP/ADV) 100 mL MBP ##4-HOUR EXTENDED INFUSION##  3.375 g IntraVENous Q8H    VANCOMYCIN INFORMATION NOTE   Other Rx Dosing/Monitoring    vancomycin (VANCOCIN) 2000 mg in  ml infusion  2,000 mg IntraVENous ONCE    insulin glargine (LANTUS) injection 24 Units  24 Units SubCUTAneous QHS    revefenacin (YUPELRI) nebulizer solution 175 mcg    175 mcg Nebulization DAILY    furosemide (LASIX) injection 40 mg  40 mg IntraVENous ONCE    budesonide (PULMICORT) 500 mcg/2 ml nebulizer suspension  500 mcg Nebulization BID RT    potassium chloride SR (KLOR-CON 10) tablet 10 mEq  10 mEq Oral DAILY    ondansetron (ZOFRAN) injection 4 mg  4 mg IntraVENous Q4H PRN    sodium chloride (NS) flush 5-40 mL  5-40 mL IntraVENous Q8H    sodium chloride (NS) flush 5-40 mL  5-40 mL IntraVENous PRN    magnesium hydroxide (MILK OF MAGNESIA) 400 mg/5 mL oral suspension 30 mL  30 mL Oral DAILY PRN    docusate sodium (COLACE) capsule 100 mg  100 mg Oral BID    metoprolol tartrate (LOPRESSOR) tablet 25 mg  25 mg Oral Q6H    heparin 25,000 units in D5W 250 ml infusion  11-25 Units/kg/hr IntraVENous TITRATE    aspirin chewable tablet 81 mg  81 mg Oral DAILY    atorvastatin (LIPITOR) tablet 20 mg  20 mg Oral QHS    lisinopril (PRINIVIL, ZESTRIL) tablet 5 mg  5 mg Oral DAILY    glucose chewable tablet 16 g  4 Tab Oral PRN    glucagon (GLUCAGEN) injection 1 mg  1 mg IntraMUSCular PRN    dextrose 10 % infusion 125-250 mL  125-250 mL IntraVENous PRN    albuterol-ipratropium (DUO-NEB) 2.5 MG-0.5 MG/3 ML  3 mL Nebulization Q6H PRN         Lyla Ramsey PA-C  [id number]  Hospitalist Division  Pager: [phone]  Office: [phone]

## 2019-11-28 LAB
ANION GAP SERPL CALC-SCNC: 3 MMOL/L (ref 3–18)
APTT PPP: 87.3 SEC (ref 23–36.4)
BUN SERPL-MCNC: 16 MG/DL (ref 7–18)
BUN/CREAT SERPL: 26 (ref 12–20)
CALCIUM SERPL-MCNC: 8.9 MG/DL (ref 8.5–10.1)
CHLORIDE SERPL-SCNC: 106 MMOL/L (ref 100–111)
CO2 SERPL-SCNC: 27 MMOL/L (ref 21–32)
CREAT SERPL-MCNC: 0.61 MG/DL (ref 0.6–1.3)
GLUCOSE BLD STRIP.AUTO-MCNC: 153 MG/DL (ref 70–110)
GLUCOSE BLD STRIP.AUTO-MCNC: 176 MG/DL (ref 70–110)
GLUCOSE BLD STRIP.AUTO-MCNC: 206 MG/DL (ref 70–110)
GLUCOSE BLD STRIP.AUTO-MCNC: 231 MG/DL (ref 70–110)
GLUCOSE SERPL-MCNC: 141 MG/DL (ref 74–99)
POTASSIUM SERPL-SCNC: 3.8 MMOL/L (ref 3.5–5.5)
SODIUM SERPL-SCNC: 136 MMOL/L (ref 136–145)

## 2019-11-28 PROCEDURE — 74011000258 HC RX REV CODE- 258: Performed by: INTERNAL MEDICINE

## 2019-11-28 PROCEDURE — 94761 N-INVAS EAR/PLS OXIMETRY MLT: CPT

## 2019-11-28 PROCEDURE — 74011250636 HC RX REV CODE- 250/636

## 2019-11-28 PROCEDURE — 74011250636 HC RX REV CODE- 250/636: Performed by: INTERNAL MEDICINE

## 2019-11-28 PROCEDURE — 82962 GLUCOSE BLOOD TEST: CPT

## 2019-11-28 PROCEDURE — 74011636637 HC RX REV CODE- 636/637: Performed by: HOSPITALIST

## 2019-11-28 PROCEDURE — 74011250637 HC RX REV CODE- 250/637: Performed by: INTERNAL MEDICINE

## 2019-11-28 PROCEDURE — 74011000250 HC RX REV CODE- 250: Performed by: INTERNAL MEDICINE

## 2019-11-28 PROCEDURE — 94640 AIRWAY INHALATION TREATMENT: CPT

## 2019-11-28 PROCEDURE — 80048 BASIC METABOLIC PNL TOTAL CA: CPT

## 2019-11-28 PROCEDURE — 36415 COLL VENOUS BLD VENIPUNCTURE: CPT

## 2019-11-28 PROCEDURE — 65660000000 HC RM CCU STEPDOWN

## 2019-11-28 PROCEDURE — 77010033678 HC OXYGEN DAILY

## 2019-11-28 PROCEDURE — 74011000250 HC RX REV CODE- 250: Performed by: HOSPITALIST

## 2019-11-28 PROCEDURE — 74011250636 HC RX REV CODE- 250/636: Performed by: HOSPITALIST

## 2019-11-28 PROCEDURE — 85730 THROMBOPLASTIN TIME PARTIAL: CPT

## 2019-11-28 RX ORDER — FUROSEMIDE 10 MG/ML
40 INJECTION INTRAMUSCULAR; INTRAVENOUS ONCE
Status: COMPLETED | OUTPATIENT
Start: 2019-11-28 | End: 2019-11-28

## 2019-11-28 RX ORDER — METOPROLOL TARTRATE 25 MG/1
37.5 TABLET, FILM COATED ORAL EVERY 12 HOURS
Status: DISCONTINUED | OUTPATIENT
Start: 2019-11-28 | End: 2019-11-28

## 2019-11-28 RX ORDER — CARVEDILOL 3.12 MG/1
3.12 TABLET ORAL 2 TIMES DAILY WITH MEALS
Status: DISCONTINUED | OUTPATIENT
Start: 2019-11-28 | End: 2019-11-29 | Stop reason: HOSPADM

## 2019-11-28 RX ADMIN — INSULIN LISPRO 6 UNITS: 100 INJECTION, SOLUTION INTRAVENOUS; SUBCUTANEOUS at 17:28

## 2019-11-28 RX ADMIN — LISINOPRIL 5 MG: 5 TABLET ORAL at 08:45

## 2019-11-28 RX ADMIN — PIPERACILLIN AND TAZOBACTAM 3.38 G: 3; .375 INJECTION, POWDER, LYOPHILIZED, FOR SOLUTION INTRAVENOUS at 08:36

## 2019-11-28 RX ADMIN — REVEFENACIN 175 MCG: 175 SOLUTION RESPIRATORY (INHALATION) at 08:03

## 2019-11-28 RX ADMIN — POTASSIUM CHLORIDE 10 MEQ: 750 TABLET, EXTENDED RELEASE ORAL at 08:39

## 2019-11-28 RX ADMIN — Medication 10 ML: at 13:01

## 2019-11-28 RX ADMIN — FUROSEMIDE 40 MG: 10 INJECTION, SOLUTION INTRAMUSCULAR; INTRAVENOUS at 10:29

## 2019-11-28 RX ADMIN — PIPERACILLIN AND TAZOBACTAM 3.38 G: 3; .375 INJECTION, POWDER, LYOPHILIZED, FOR SOLUTION INTRAVENOUS at 23:04

## 2019-11-28 RX ADMIN — CARVEDILOL 3.12 MG: 3.12 TABLET, FILM COATED ORAL at 16:28

## 2019-11-28 RX ADMIN — ATORVASTATIN CALCIUM 20 MG: 20 TABLET, FILM COATED ORAL at 21:38

## 2019-11-28 RX ADMIN — METOPROLOL TARTRATE 25 MG: 25 TABLET ORAL at 06:10

## 2019-11-28 RX ADMIN — FUROSEMIDE 40 MG: 10 INJECTION, SOLUTION INTRAMUSCULAR; INTRAVENOUS at 16:28

## 2019-11-28 RX ADMIN — ASPIRIN 81 MG 81 MG: 81 TABLET ORAL at 08:39

## 2019-11-28 RX ADMIN — INSULIN LISPRO 3 UNITS: 100 INJECTION, SOLUTION INTRAVENOUS; SUBCUTANEOUS at 12:48

## 2019-11-28 RX ADMIN — INSULIN GLARGINE 24 UNITS: 100 INJECTION, SOLUTION SUBCUTANEOUS at 21:40

## 2019-11-28 RX ADMIN — BUDESONIDE 500 MCG: 0.5 INHALANT RESPIRATORY (INHALATION) at 21:20

## 2019-11-28 RX ADMIN — CARVEDILOL 3.12 MG: 3.12 TABLET, FILM COATED ORAL at 10:28

## 2019-11-28 RX ADMIN — Medication 10 ML: at 06:11

## 2019-11-28 RX ADMIN — INSULIN LISPRO 6 UNITS: 100 INJECTION, SOLUTION INTRAVENOUS; SUBCUTANEOUS at 21:39

## 2019-11-28 RX ADMIN — SODIUM CHLORIDE 1250 MG: 900 INJECTION, SOLUTION INTRAVENOUS at 01:16

## 2019-11-28 RX ADMIN — BUDESONIDE 500 MCG: 0.5 INHALANT RESPIRATORY (INHALATION) at 08:03

## 2019-11-28 RX ADMIN — PIPERACILLIN AND TAZOBACTAM 3.38 G: 3; .375 INJECTION, POWDER, LYOPHILIZED, FOR SOLUTION INTRAVENOUS at 16:36

## 2019-11-28 RX ADMIN — HEPARIN SODIUM 3000 UNITS: 1000 INJECTION, SOLUTION INTRAVENOUS; SUBCUTANEOUS at 00:00

## 2019-11-28 RX ADMIN — INSULIN LISPRO 3 UNITS: 100 INJECTION, SOLUTION INTRAVENOUS; SUBCUTANEOUS at 08:49

## 2019-11-28 RX ADMIN — HEPARIN SODIUM 3000 UNITS: 1000 INJECTION INTRAVENOUS; SUBCUTANEOUS at 00:00

## 2019-11-28 RX ADMIN — Medication 10 ML: at 22:00

## 2019-11-28 RX ADMIN — HEPARIN SODIUM 16 UNITS/KG/HR: 10000 INJECTION, SOLUTION INTRAVENOUS at 19:26

## 2019-11-28 RX ADMIN — SODIUM CHLORIDE 1250 MG: 900 INJECTION, SOLUTION INTRAVENOUS at 12:49

## 2019-11-28 NOTE — PROGRESS NOTES
Problem: Pain  Goal: *Control of Pain  Outcome: Progressing Towards Goal     Problem: Falls - Risk of  Goal: *Absence of Falls  Description  Document Jory Dear Fall Risk and appropriate interventions in the flowsheet.   Outcome: Progressing Towards Goal  Note: Fall Risk Interventions:            Medication Interventions: Patient to call before getting OOB, Teach patient to arise slowly         History of Falls Interventions: Door open when patient unattended, Room close to nurse's station

## 2019-11-28 NOTE — PROGRESS NOTES
[facility] -     Date of  Admission: 11/26/2019  3:30 PM   Primary Care Physician:  Meri Hernández MD       Assessment:     - NSTEMI: Troponin 3.17-->2.67---> 2.54, presented with shortness of breath but no chest pain  -Acute systolic congestive heart failure: Echo with LVEF 25-30%  -  Abnormal chest CT: \"There is now new occlusion of the right lower lobe pulmonary artery and right inferior pulmonary vein. This is suggestive of encasement by tumor or stricturing from radiation changes, less likely due to pulmonary embolism. \"  - Presented initially with elevated D-Dimer, CT was negative for PE but abnormal findings as above  - Likely malignant pleural effusion on CT chest   - Lung cancer  - Hypertension  - Diabetes Mellitus, HgbA1C 10.1  - Former tobacco use  - COPD     Plan:     2.3 L urine output noted yesterday  We will give IV Lasix once those now and another one in the evening. Heme-onc evaluation noted. Patient has a locally advanced cancer and on systemic therapy. According to heme-onc team and according to patient's discussion with heme-onc team, any cardiac work-up needed can be done. They do not suspect any shortened survival at this time. Discussed with patient in detail and she would like to consider ischemic work-up. This is most likely nonischemic cardiomyopathy and type II MI. Will proceed with nuclear stress test.  Discussed invasive evaluation however patient prefer noninvasive evaluation first which is reasonable  We will stop IV heparin after 48 hours  Continue aspirin, statin and beta-blocker     History of Present Illness: This is a 75 y.o. female admitted for NSTEMI (non-ST elevated myocardial infarction) ([facility] [address] [I21.4]  Pneumonitis [J18.9]  Occlusion of artery [I70.90]  Occlusion of vein [I82.90]. Patient complains of:  Shortness of breath    This is a 75year old female with a past medical history as outlined above that cardiology is seeing in consult due to NSTEMI. She presented to the ED yesterday under the recommendation of her primary care provider due to elevated D-dimer. Her CT chest was negative but there was a new abnormal finding of new occlusion of the right lower lobe pulmonary artery and right inferior pulmonary vein. The patient states that she has felt more short of breath over the last two weeks and has had bilateral LE edema, she also has had orthopnea. She has not had any chest pain. Denies palpitations, PND or syncope. She has not CAD or CHF history. She is a former smoker and quit 15 years ago. Has received radiation therapy and chemotherapy for lung CA but not surgery.        Physical Exam:     Visit Vitals  /79 (BP 1 Location: Left arm, BP Patient Position: Head of bed elevated (Comment degrees))   Pulse 91   Temp 97.8 °F (36.6 °C)   Resp 20   Ht 5' 6\" (1.676 m)   Wt 189 lb (85.7 kg)   SpO2 100%   Breastfeeding No   BMI 30.51 kg/m²     BP Readings from Last 3 Encounters:   11/28/19 130/79   05/20/19 180/88   04/24/19 177/72     Pulse Readings from Last 3 Encounters:   11/28/19 91   05/20/19 87   04/24/19 97     Wt Readings from Last 3 Encounters:   11/27/19 189 lb (85.7 kg)   04/24/19 198 lb (89.8 kg)   01/24/19 284 lb (128.8 kg)       General:  alert, cooperative, no distress  Neck:  nontender, no JVD  Lungs:  +decreased breath sounds at bases, more on right side  Heart:  regular rate and rhythm, S1, S2 normal, no murmur, click, rub or gallop  Abdomen:  abdomen is soft   Extremities:  edema 1+ bilateral LE's     Data Review:     Recent Labs     11/27/19  0538 11/26/19  1605   WBC 8.9 10.0   HGB 11.7* 11.8*   HCT 37.2 36.6    396     Recent Labs     11/28/19  0740 11/27/19  0538 11/26/19  1605    137 135*   K 3.8 3.7 4.3    103 102   CO2 27 29 27   * 164* 314*   BUN 16 13 15   CREA 0.61 0.67 0.87   CA 8.9 9.2 9.2   ALB  --  2.5* 2.6*   SGOT  --  13 17   ALT  --  21 23   INR  --   --  1.0       Results for orders placed or performed during the hospital encounter of 11/26/19   EKG, 12 LEAD, INITIAL   Result Value Ref Range    Ventricular Rate 107 BPM    Atrial Rate 107 BPM    P-R Interval 148 ms    QRS Duration 78 ms    Q-T Interval 376 ms    QTC Calculation (Bezet) 501 ms    Calculated P Axis 52 degrees    Calculated R Axis 33 degrees    Calculated T Axis 86 degrees    Diagnosis       Sinus tachycardia  Minimal voltage criteria for LVH, may be normal variant ( Sokolow-Calderón )  Nonspecific T wave abnormality  Abnormal ECG  When compared with ECG of 24-APR-2019 16:03,  Nonspecific T wave abnormality now evident in Anterior leads  Confirmed by David Erazo ([id number] [id number]) on 11/27/2019 8:07:16 AM         All Cardiac Markers in the last 24 hours:    No results found for: CPK, CK, CKMMB, CKMB, RCK3, CKMBT, CKNDX, CKND1, ODELL, TROPT, TROIQ, CHERELLE, TROPT, TNIPOC, BNP, BNPP    Last Lipid:    Lab Results   Component Value Date/Time    Cholesterol, total 206 (H) 11/27/2019 05:38 AM    HDL Cholesterol 68 (H) 11/27/2019 05:38 AM    LDL, calculated 109.8 (H) 11/27/2019 05:38 AM    Triglyceride 141 11/27/2019 05:38 AM    CHOL/HDL Ratio 3.0 11/27/2019 05:38 AM       Signed By: Eddie Paulson MD     November 28, 2019

## 2019-11-28 NOTE — PROGRESS NOTES
Problem: Chronic Obstructive Pulmonary Disease (COPD)  Goal: *Oxygen saturation during activity within specified parameters  Outcome: Progressing Towards Goal  Goal: *Able to remain out of bed as prescribed  Outcome: Progressing Towards Goal  Goal: *Absence of hypoxia  Outcome: Progressing Towards Goal  Goal: *Optimize nutritional status  Outcome: Progressing Towards Goal     Problem: Patient Education: Go to Patient Education Activity  Goal: Patient/Family Education  Outcome: Progressing Towards Goal     Problem: Pain  Goal: *Control of Pain  Outcome: Progressing Towards Goal     Problem: Patient Education: Go to Patient Education Activity  Goal: Patient/Family Education  Outcome: Progressing Towards Goal     Problem: Falls - Risk of  Goal: *Absence of Falls  Description  Document [facility] Fall Risk and appropriate interventions in the flowsheet. Outcome: Progressing Towards Goal  Note: Fall Risk Interventions:            Medication Interventions: Patient to call before getting OOB, Teach patient to arise slowly         History of Falls Interventions: Door open when patient unattended, Room close to nurse's station         Problem: Patient Education: Go to Patient Education Activity  Goal: Patient/Family Education  Outcome: Progressing Towards Goal     Problem: Hypertension  Goal: *Blood pressure within specified parameters  Outcome: Progressing Towards Goal  Goal: *Fluid volume balance  Outcome: Progressing Towards Goal  Goal: *Labs within defined limits  Outcome: Progressing Towards Goal     Problem: Patient Education: Go to Patient Education Activity  Goal: Patient/Family Education  Outcome: Progressing Towards Goal     Problem: Diabetes Self-Management  Goal: *Disease process and treatment process  Description  Define diabetes and identify own type of diabetes; list 3 options for treating diabetes.   Outcome: Progressing Towards Goal  Goal: *Incorporating nutritional management into lifestyle  Description  Describe effect of type, amount and timing of food on blood glucose; list 3 methods for planning meals. Outcome: Progressing Towards Goal  Goal: *Incorporating physical activity into lifestyle  Description  State effect of exercise on blood glucose levels. Outcome: Progressing Towards Goal  Goal: *Developing strategies to promote health/change behavior  Description  Define the ABC's of diabetes; identify appropriate screenings, schedule and personal plan for screenings. Outcome: Progressing Towards Goal  Goal: *Using medications safely  Description  State effect of diabetes medications on diabetes; name diabetes medication taking, action and side effects. Outcome: Progressing Towards Goal  Goal: *Monitoring blood glucose, interpreting and using results  Description  Identify recommended blood glucose targets  and personal targets. Outcome: Progressing Towards Goal  Goal: *Prevention, detection, treatment of acute complications  Description  List symptoms of hyper- and hypoglycemia; describe how to treat low blood sugar and actions for lowering  high blood glucose level. Outcome: Progressing Towards Goal  Goal: *Prevention, detection and treatment of chronic complications  Description  Define the natural course of diabetes and describe the relationship of blood glucose levels to long term complications of diabetes.   Outcome: Progressing Towards Goal  Goal: *Developing strategies to address psychosocial issues  Description  Describe feelings about living with diabetes; identify support needed and support network  Outcome: Progressing Towards Goal     Problem: Unstable angina/NSTEMI: Day 2  Goal: Activity/Safety  Outcome: Progressing Towards Goal  Goal: Consults, if ordered  Outcome: Progressing Towards Goal  Goal: Diagnostic Test/Procedures  Outcome: Progressing Towards Goal  Goal: Nutrition/Diet  Outcome: Progressing Towards Goal  Goal: Discharge Planning  Outcome: Progressing Towards Goal  Goal: Medications  Outcome: Progressing Towards Goal  Goal: Respiratory  Outcome: Progressing Towards Goal  Goal: Treatments/Interventions/Procedures  Outcome: Progressing Towards Goal  Goal: Psychosocial  Outcome: Progressing Towards Goal  Goal: *Hemodynamically stable  Outcome: Progressing Towards Goal  Goal: *Optimal pain control at patient's stated goal  Outcome: Progressing Towards Goal  Goal: *Lungs clear or at baseline  Outcome: Progressing Towards Goal

## 2019-11-28 NOTE — PROGRESS NOTES
Internal Medicine Progress Note    Patient's Name: Conuselo Kilpatrick  Admit Date: 11/26/2019  Length of Stay: 2      Assessment/Plan     Principal Problem:    NSTEMI (non-ST elevated myocardial infarction) ([facility] [address] (11/26/2019)    Active Problems:    Pneumonitis (11/26/2019)      Overview: Post-Obstructive Pneumonia vs. Radiation Pneumonitis      Arterial occlusion (11/26/2019)      Overview: Right Lower Lobe Artery Occlusion and Right Inferior Pulmonary Vein       Occlusion possibly due to compression by Lung Mass. COPD (chronic obstructive pulmonary disease) ([facility] [address] (11/26/2019)      Diabetes mellitus, type 2 ([facility] [address] (11/26/2019)      HTN (hypertension) (11/26/2019)      Occlusion of artery (11/26/2019)      Occlusion of vein (11/26/2019)      NSTEMI  Cardiology consulted - medical vs invasive management  -follow cardiac enzymes  -spoke with Heme oncologist - no further interventions planned. Patient is to continue with palliative therapy as outpatient, Locally advanced, but controlled lung cancer - cardiac workup is ok to be completed  -nuclear stress test planned    COPD  Does not appear to be in acute exacerbation  Breathing treatments as needed  Wears home oxygen as needed, wean hospital oxygen as tolerated     Postobstructive PNA  Continue abx    Pleural effusion  -no resp distress, therapeutic can be with held at this time    - Cont acceptable home medications for chronic conditions   - DVT protocol    I have personally reviewed all pertinent labs and films that have officially resulted over the last 24 hours. I have personally checked for all pending labs that are awaiting final results. Interval History   \"Tawanda Gee is a 75 y.o. -american female who presents to [facility] with complaint of Shortness of Breath. Patient states that she has been having worsening shortness of breath over the last 2 weeks that she had been treating with Nebulized breathing treatments.   Patient states that her breathing improved over the last 3 days. However, Patient went to see her Oncologist and was referred to [facility] ER when she mentioned that she had been having an elevated heart rate and shortness of breath. Patient reports that she was diagnosed with Lung Cancer and completed her Chemotherapy and Radiation Treatments in 10/2019. Patient also complains of increased anxiety with some nausea and vomiting in the AM for the last few weeks. Patient reports that she has been having difficult breathing when she sleeps laying flat and that her legs have been having increasing swelling over the last 5 days. Patient also reports worsening exertional dyspnea over greater than 30 days, but denies chest pain/pressure. Patient also remarks that her right leg is more swollen than her left and reports that she had a Fall approximately 1 month ago. Notably, Patient's Lung Cancer was located on her Left Side.     CTA performed in [facility] ER showed new occlusion of Right Lower Lobe Artery and Right Inferior Pulmonary Vein. Likewise, Left Lung appears to have increased consolidation consistent with Post-Obstructive PNA or Radiation Pneumonitis. Concerns for mass effect causing both issues. Troponin noted to be 3.17 in [facility] ER.     Patient is admitted for management of NSTEMI and further evaluation of possible return of Lung Tumor with compression of vasculature and possible Post-Obstructive Pneumonia. \"    Heme oncology consulted, Cardiology consulted. Subjective     Pt s/e @ bedside. No major events overnight. No complaints this morning, chest pain has relieved it self.   No shortness of breath, or any resp complaints at this time    Objective     Visit Vitals  /85 (BP 1 Location: Left arm, BP Patient Position: Head of bed elevated (Comment degrees))   Pulse 96   Temp 98 °F (36.7 °C)   Resp 19   Ht 5' 6\" (1.676 m)   Wt 85.7 kg (189 lb)   SpO2 100%   Breastfeeding No   BMI 30.51 kg/m²       Physical Exam:  General Appearance: NAD, conversant, appears chronically ill, NC in place   HENT: normocephalic/atraumatic, moist mucus membranes  Neck: No JVD, supple  Lungs: course breath sounds bilat, no resp distress  CV: RRR, no m/r/g  Abdomen: soft, non-tender, normal bowel sounds  Extremities: no cyanosis, no peripheral edema  Neuro: No focal deficits, motor/sensory intact    Intake and Output:  Current Shift:  11/28 0701 - 11/28 1900  In: 360 [P.O.:360]  Out: 600 [Urine:600]  Last three shifts:  11/26 1901 - 11/28 0700  In: 2572.7 [P.O.:1320; I.V.:1252.7]  Out: 2300 [Urine:2300]    Lab/Data Reviewed: All lab results for the last 24 hours reviewed. Imaging Reviewed:  No results found.     Medications Reviewed:  Current Facility-Administered Medications   Medication Dose Route Frequency    furosemide (LASIX) injection 40 mg  40 mg IntraVENous ONCE    carvedilol (COREG) tablet 3.125 mg  3.125 mg Oral BID WITH MEALS    insulin lispro (HUMALOG) injection   SubCUTAneous QID AFTER MEALS    piperacillin-tazobactam (ZOSYN) 3.375 g in 0.9% sodium chloride (MBP/ADV) 100 mL MBP ##4-HOUR EXTENDED INFUSION##  3.375 g IntraVENous Q8H    VANCOMYCIN INFORMATION NOTE   Other Rx Dosing/Monitoring    insulin glargine (LANTUS) injection 24 Units  24 Units SubCUTAneous QHS    revefenacin (YUPELRI) nebulizer solution 175 mcg    175 mcg Nebulization DAILY    vancomycin (VANCOCIN) 1,250 mg in 0.9% sodium chloride 250 mL IVPB  1,250 mg IntraVENous Q12H    [START ON 11/29/2019] VANCOMYCIN INFORMATION NOTE   Other ONCE    diphenhydrAMINE (BENADRYL) capsule 25 mg  25 mg Oral Q6H PRN    budesonide (PULMICORT) 500 mcg/2 ml nebulizer suspension  500 mcg Nebulization BID RT    potassium chloride SR (KLOR-CON 10) tablet 10 mEq  10 mEq Oral DAILY    ondansetron (ZOFRAN) injection 4 mg  4 mg IntraVENous Q4H PRN    sodium chloride (NS) flush 5-40 mL  5-40 mL IntraVENous Q8H    sodium chloride (NS) flush 5-40 mL  5-40 mL IntraVENous PRN    magnesium hydroxide (MILK OF MAGNESIA) 400 mg/5 mL oral suspension 30 mL  30 mL Oral DAILY PRN    docusate sodium (COLACE) capsule 100 mg  100 mg Oral BID    heparin 25,000 units in D5W 250 ml infusion  11-25 Units/kg/hr IntraVENous TITRATE    aspirin chewable tablet 81 mg  81 mg Oral DAILY    atorvastatin (LIPITOR) tablet 20 mg  20 mg Oral QHS    lisinopril (PRINIVIL, ZESTRIL) tablet 5 mg  5 mg Oral DAILY    glucose chewable tablet 16 g  4 Tab Oral PRN    glucagon (GLUCAGEN) injection 1 mg  1 mg IntraMUSCular PRN    dextrose 10 % infusion 125-250 mL  125-250 mL IntraVENous PRN    albuterol-ipratropium (DUO-NEB) 2.5 MG-0.5 MG/3 ML  3 mL Nebulization Q6H PRN         ZAMZAM [facility]  Pager: [phone]  Office: [phone]

## 2019-11-28 NOTE — PROGRESS NOTES
Problem: Chronic Obstructive Pulmonary Disease (COPD)  Goal: *Oxygen saturation during activity within specified parameters  Outcome: Progressing Towards Goal   Respiratory Therapy Assessment Care Plan    Patient:  Maria Esther Vela 75 y.o. female 11/28/2019 8:08 AM    NSTEMI (non-ST elevated myocardial infarction) ([facility] [address] [I21.4]  Pneumonitis [J18.9]  Occlusion of artery [I70.90]  Occlusion of vein [I82.90]      Chest X-RAY:   Results from Hospital Encounter encounter on 04/24/19   XR CHEST PORT    Impression IMPRESSION:    No significant interval change given differences in technique. Continued  ill-defined right hilar and lower lobe airspace process, which is nonspecific  but given history may represent sequela of prior right lung cancer with  radiation therapy. Superimposed pneumonia is possible, although no definite  change is seen. Results from [place] encounter on 12/30/18   XR CHEST PA LAT    Impression IMPRESSION:    1. Shallow inspiration with interval increase of right lower lobe airspace  process. Follow-up is recommended. Patient has known history of neoplasm with  possible radiation treatment. Results from Hospital Encounter encounter on 10/31/18   XR CHEST PA LAT    Impression IMPRESSION:    Patchy alveolar infiltrates in the mid to inferior right lung. Vital Signs:   Visit Vitals  /54 (BP 1 Location: Left arm, BP Patient Position: Lying right side)   Pulse 97   Temp 98.1 °F (36.7 °C)   Resp 20   Ht 5' 6\" (1.676 m)   Wt 85.7 kg (189 lb)   SpO2 100%   Breastfeeding No   BMI 30.51 kg/m²       Indications for treatment: COPD        Plan of care:Scheduled nebs.            Goal: improve WOB

## 2019-11-28 NOTE — PROGRESS NOTES
Problem: Pain  Goal: *Control of Pain  Outcome: Progressing Towards Goal     Problem: Falls - Risk of  Goal: *Absence of Falls  Description  Document Danielle Ladd Fall Risk and appropriate interventions in the flowsheet.   Outcome: Progressing Towards Goal  Note: Fall Risk Interventions:            Medication Interventions: Patient to call before getting OOB, Evaluate medications/consider consulting pharmacy         History of Falls Interventions: Door open when patient unattended         Problem: Hypertension  Goal: *Blood pressure within specified parameters  Outcome: Progressing Towards Goal  Goal: *Fluid volume balance  Outcome: Progressing Towards Goal  Goal: *Labs within defined limits  Outcome: Progressing Towards Goal

## 2019-11-28 NOTE — ROUTINE PROCESS
1933: Assumed care. HOB elevated. On oxygen at 2 LPM. On Heparin gtt at 14 units/kg/hr. Infusing well to Rt hand. Denies any pain or discomfort at this time. Call light within reach. Family at bedside. 2000: Patient didn't eat dinner. Frozen TV dinner provided. 2122: Due meds given. . Coverage provided per protocol. 2358: APTT 48.1. Heparin gtt rate adjusted per protocol. Bolus given. Next APTT due at 0600.     0116: Due med given. 0421: No change from previous assessment. Assisted patient to bathroom & back in bed.     0610: Slept good thru night. Needs attended. Due med given. 1488: Bedside and Verbal shift change report given to [address] [place] [address] (oncoming nurse) by me (offgoing nurse).  Report included the following information SBAR, Kardex, Intake/Output, MAR, Recent Results and Cardiac Rhythm SR.

## 2019-11-28 NOTE — PROGRESS NOTES
Problem: Falls - Risk of  Goal: *Absence of Falls  Description  Document Danielle Ladd Fall Risk and appropriate interventions in the flowsheet.   Outcome: Progressing Towards Goal  Note: Fall Risk Interventions:            Medication Interventions: Patient to call before getting OOB, Teach patient to arise slowly         History of Falls Interventions: Door open when patient unattended, Room close to nurse's station

## 2019-11-28 NOTE — ROUTINE PROCESS
0810-Assessment completed,call bell within reach, no distress noted, voiced no complaints at this time. 1230-no change in condition, no distress noted, voiced no complaints at this time. 1400-resting quietly in bed. 1630-no change in condition, no distress noted. 1920-Bedside and Verbal shift change report given to Shelley WAYNE (oncoming nurse) by Promise Parker (offgoing nurse). Report included the following information SBAR, MAR and Recent Results.

## 2019-11-28 NOTE — ROUTINE PROCESS
Patient received in bed awake. Patient alert and oriented X4, denies pain and discomfort. Patient resting quietly. Frequent use items within reach. Bed locked in low position. Call bell within reach and patient verbalized understanding of use for assistance and needs. Dual skin assessment conducted with Shelley ANDERSON. Skin is intact. 7206:  Called the lab about aPTT results from blood draw this morning at shift change. No results available. Per phlebotomy blood is not in the lab, they will be up to draw another sample. 1010:  Still awaiting aPTT from the lab. 1011:  Results for aPTT is 87.3. No change in heparin rate needed. Next aPTT will be tomorrow morning.

## 2019-11-29 ENCOUNTER — APPOINTMENT (OUTPATIENT)
Dept: NON INVASIVE DIAGNOSTICS | Age: 64
DRG: 280 | End: 2019-11-29
Attending: INTERNAL MEDICINE
Payer: MEDICARE

## 2019-11-29 ENCOUNTER — APPOINTMENT (OUTPATIENT)
Dept: NUCLEAR MEDICINE | Age: 64
DRG: 280 | End: 2019-11-29
Attending: INTERNAL MEDICINE
Payer: MEDICARE

## 2019-11-29 VITALS
BODY MASS INDEX: 31.18 KG/M2 | TEMPERATURE: 98 F | DIASTOLIC BLOOD PRESSURE: 66 MMHG | HEIGHT: 66 IN | OXYGEN SATURATION: 97 % | WEIGHT: 194 LBS | RESPIRATION RATE: 18 BRPM | HEART RATE: 94 BPM | SYSTOLIC BLOOD PRESSURE: 128 MMHG

## 2019-11-29 LAB
ANION GAP SERPL CALC-SCNC: 6 MMOL/L (ref 3–18)
APTT PPP: 40 SEC (ref 23–36.4)
BUN SERPL-MCNC: 23 MG/DL (ref 7–18)
BUN/CREAT SERPL: 22 (ref 12–20)
CALCIUM SERPL-MCNC: 8.9 MG/DL (ref 8.5–10.1)
CHLORIDE SERPL-SCNC: 107 MMOL/L (ref 100–111)
CO2 SERPL-SCNC: 26 MMOL/L (ref 21–32)
CREAT SERPL-MCNC: 1.03 MG/DL (ref 0.6–1.3)
DATE LAST DOSE: NORMAL
GLUCOSE BLD STRIP.AUTO-MCNC: 116 MG/DL (ref 70–110)
GLUCOSE BLD STRIP.AUTO-MCNC: 217 MG/DL (ref 70–110)
GLUCOSE SERPL-MCNC: 220 MG/DL (ref 74–99)
POTASSIUM SERPL-SCNC: 4.3 MMOL/L (ref 3.5–5.5)
REPORTED DOSE,DOSE: 1250 UNITS
REPORTED DOSE/TIME,TMG: 100
SODIUM SERPL-SCNC: 139 MMOL/L (ref 136–145)
STRESS BASELINE HR: 96 BPM
STRESS ESTIMATED WORKLOAD: 1.4 METS
STRESS EXERCISE DUR MIN: NORMAL
STRESS PEAK DIAS BP: 62 MMHG
STRESS PEAK SYS BP: 151 MMHG
STRESS PERCENT HR ACHIEVED: 79 %
STRESS POST PEAK HR: 121 BPM
STRESS RATE PRESSURE PRODUCT: NORMAL BPM*MMHG
STRESS TARGET HR: 154 BPM
VANCOMYCIN TROUGH SERPL-MCNC: 16.1 UG/ML (ref 10–20)

## 2019-11-29 PROCEDURE — 74011000250 HC RX REV CODE- 250: Performed by: INTERNAL MEDICINE

## 2019-11-29 PROCEDURE — 80202 ASSAY OF VANCOMYCIN: CPT

## 2019-11-29 PROCEDURE — 74011636637 HC RX REV CODE- 636/637: Performed by: HOSPITALIST

## 2019-11-29 PROCEDURE — 74011250636 HC RX REV CODE- 250/636: Performed by: INTERNAL MEDICINE

## 2019-11-29 PROCEDURE — 82962 GLUCOSE BLOOD TEST: CPT

## 2019-11-29 PROCEDURE — 74011000250 HC RX REV CODE- 250: Performed by: HOSPITALIST

## 2019-11-29 PROCEDURE — 94761 N-INVAS EAR/PLS OXIMETRY MLT: CPT

## 2019-11-29 PROCEDURE — 93017 CV STRESS TEST TRACING ONLY: CPT

## 2019-11-29 PROCEDURE — 74011250637 HC RX REV CODE- 250/637: Performed by: INTERNAL MEDICINE

## 2019-11-29 PROCEDURE — 74011000258 HC RX REV CODE- 258: Performed by: INTERNAL MEDICINE

## 2019-11-29 PROCEDURE — 74011250636 HC RX REV CODE- 250/636: Performed by: HOSPITALIST

## 2019-11-29 PROCEDURE — 80048 BASIC METABOLIC PNL TOTAL CA: CPT

## 2019-11-29 PROCEDURE — 94640 AIRWAY INHALATION TREATMENT: CPT

## 2019-11-29 PROCEDURE — 77010033678 HC OXYGEN DAILY

## 2019-11-29 PROCEDURE — A9500 TC99M SESTAMIBI: HCPCS

## 2019-11-29 PROCEDURE — 36415 COLL VENOUS BLD VENIPUNCTURE: CPT

## 2019-11-29 PROCEDURE — 85730 THROMBOPLASTIN TIME PARTIAL: CPT

## 2019-11-29 RX ORDER — GUAIFENESIN 100 MG/5ML
81 LIQUID (ML) ORAL DAILY
Qty: 30 TAB | Refills: 0 | Status: SHIPPED | OUTPATIENT
Start: 2019-11-30

## 2019-11-29 RX ORDER — SODIUM CHLORIDE 9 MG/ML
100 INJECTION, SOLUTION INTRAVENOUS CONTINUOUS
Status: DISCONTINUED | OUTPATIENT
Start: 2019-11-29 | End: 2019-11-29

## 2019-11-29 RX ORDER — LISINOPRIL 5 MG/1
5 TABLET ORAL DAILY
Qty: 30 TAB | Refills: 0 | Status: SHIPPED | OUTPATIENT
Start: 2019-11-30 | End: 2019-11-30

## 2019-11-29 RX ORDER — FUROSEMIDE 20 MG/1
20 TABLET ORAL DAILY
Qty: 30 TAB | Refills: 0 | Status: ON HOLD | OUTPATIENT
Start: 2019-11-29 | End: 2019-12-18

## 2019-11-29 RX ORDER — CARVEDILOL 3.12 MG/1
3.12 TABLET ORAL 2 TIMES DAILY WITH MEALS
Qty: 60 TAB | Refills: 0 | Status: SHIPPED | OUTPATIENT
Start: 2019-11-29 | End: 2019-11-29

## 2019-11-29 RX ORDER — ATORVASTATIN CALCIUM 20 MG/1
20 TABLET, FILM COATED ORAL
Qty: 30 TAB | Refills: 0 | Status: ON HOLD | OUTPATIENT
Start: 2019-11-29 | End: 2019-12-19 | Stop reason: SDUPTHER

## 2019-11-29 RX ORDER — GUAIFENESIN 100 MG/5ML
81 LIQUID (ML) ORAL DAILY
Qty: 30 TAB | Refills: 0 | Status: SHIPPED | OUTPATIENT
Start: 2019-11-30 | End: 2019-11-29

## 2019-11-29 RX ORDER — ATORVASTATIN CALCIUM 20 MG/1
20 TABLET, FILM COATED ORAL
Qty: 30 TAB | Refills: 0 | Status: SHIPPED | OUTPATIENT
Start: 2019-11-29 | End: 2019-11-29

## 2019-11-29 RX ORDER — FUROSEMIDE 20 MG/1
20 TABLET ORAL DAILY
Qty: 30 TAB | Refills: 0 | Status: SHIPPED | OUTPATIENT
Start: 2019-11-29 | End: 2019-11-29 | Stop reason: SDUPTHER

## 2019-11-29 RX ORDER — CARVEDILOL 3.12 MG/1
3.12 TABLET ORAL 2 TIMES DAILY WITH MEALS
Qty: 60 TAB | Refills: 0 | Status: SHIPPED | OUTPATIENT
Start: 2019-11-29 | End: 2020-01-27 | Stop reason: DRUGHIGH

## 2019-11-29 RX ORDER — POTASSIUM CHLORIDE 750 MG/1
10 TABLET, FILM COATED, EXTENDED RELEASE ORAL DAILY
Qty: 30 TAB | Refills: 0 | Status: SHIPPED | OUTPATIENT
Start: 2019-11-29 | End: 2019-12-10

## 2019-11-29 RX ORDER — LEVOFLOXACIN 750 MG/1
750 TABLET ORAL DAILY
Qty: 7 TAB | Refills: 0 | Status: SHIPPED | OUTPATIENT
Start: 2019-11-29 | End: 2019-12-10 | Stop reason: ALTCHOICE

## 2019-11-29 RX ORDER — LEVOFLOXACIN 750 MG/1
750 TABLET ORAL DAILY
Qty: 7 TAB | Refills: 0 | Status: SHIPPED | OUTPATIENT
Start: 2019-11-29 | End: 2019-11-29 | Stop reason: SDUPTHER

## 2019-11-29 RX ORDER — SPIRONOLACTONE 25 MG/1
25 TABLET ORAL DAILY
Status: DISCONTINUED | OUTPATIENT
Start: 2019-11-29 | End: 2019-11-29 | Stop reason: HOSPADM

## 2019-11-29 RX ADMIN — CARVEDILOL 3.12 MG: 3.12 TABLET, FILM COATED ORAL at 09:07

## 2019-11-29 RX ADMIN — LISINOPRIL 5 MG: 5 TABLET ORAL at 09:08

## 2019-11-29 RX ADMIN — ASPIRIN 81 MG 81 MG: 81 TABLET ORAL at 09:08

## 2019-11-29 RX ADMIN — REVEFENACIN 175 MCG: 175 SOLUTION RESPIRATORY (INHALATION) at 08:15

## 2019-11-29 RX ADMIN — INSULIN LISPRO 6 UNITS: 100 INJECTION, SOLUTION INTRAVENOUS; SUBCUTANEOUS at 09:13

## 2019-11-29 RX ADMIN — REGADENOSON 0.4 MG: 0.08 INJECTION, SOLUTION INTRAVENOUS at 13:07

## 2019-11-29 RX ADMIN — BUDESONIDE 500 MCG: 0.5 INHALANT RESPIRATORY (INHALATION) at 08:15

## 2019-11-29 RX ADMIN — Medication 10 ML: at 13:41

## 2019-11-29 RX ADMIN — SPIRONOLACTONE 25 MG: 25 TABLET ORAL at 13:39

## 2019-11-29 RX ADMIN — PIPERACILLIN AND TAZOBACTAM 3.38 G: 3; .375 INJECTION, POWDER, LYOPHILIZED, FOR SOLUTION INTRAVENOUS at 09:08

## 2019-11-29 RX ADMIN — SODIUM CHLORIDE 1250 MG: 900 INJECTION, SOLUTION INTRAVENOUS at 00:52

## 2019-11-29 RX ADMIN — POTASSIUM CHLORIDE 10 MEQ: 750 TABLET, EXTENDED RELEASE ORAL at 09:08

## 2019-11-29 RX ADMIN — SODIUM CHLORIDE 1250 MG: 900 INJECTION, SOLUTION INTRAVENOUS at 13:40

## 2019-11-29 RX ADMIN — Medication 10 ML: at 09:14

## 2019-11-29 NOTE — ROUTINE PROCESS
1920: Assumed care. AAOX4. Sitting in bed. On oxygen at 2 LPM. On Heparin gtt at 16 units/kg/hr or  13.8 ml/hr. Infusing well to Rt hand. Sister at bedside. Patient denies any pain or discomfort at this time. Call light within reach. 2138: Due meds given. . Coverage provided per protocol. Reminded patient nothing to eat or drink after MN. Verbalized understanding. 2200: Heparin gtt d/cd per Cardiology. 2300: No change from previous assessment. 0200: Sleeping.     0356: No change from previous assessment. 0600: Slept good thru night. Needs attended. 1529: Bedside and Verbal shift change report given to [facility] (oncoming nurse) by me (offgoing nurse).  Report included the following information SBAR, Kardex, Intake/Output, MAR, Recent Results and Cardiac Rhythm SR.

## 2019-11-29 NOTE — PROGRESS NOTES
Discharge/Transition Planning    Problem: Discharge Planning  Goal: *Discharge to safe environment  Outcome: Resolved/Met     Plan: home    Care Management following and chart reviewed.  Pt plans on returning home at discharge      Alphonso Buck RN BSN  Outcomes Manager  Pager # [phone]

## 2019-11-29 NOTE — PROGRESS NOTES
[facility]  -  Progress Note      Patient: Consuelo Kilpatrick MRN: [id number]  SSN: [id number]    YOB: 1955  Age: 75 y.o. Sex: female      Admit Date: 11/26/2019      I saw, evaluated, interviewed and examined the patient personally. I agree with the findings and plan of care as documented below with PA-C note  Patient seen earlier. Late entry of note in chart  Stress test with mild anterior defect. Artifect vs mild ischemia. DW patient. Options of management discussed in detail of [facility] vs medical management. Patient will discuss with family and decide about timings  Continue current care  Start aldactone. Continue rest of cardiac meds. Bill Holley MD       Assessment:     - NSTEMI: Troponin 3.17-->2.67---> 2.54, presented with shortness of breath but no chest pain  - Acute systolic congestive heart failure: Echo with LVEF 25-30%  -  Abnormal chest CTA 11/26/2019: New occlusion of the right lower lobe pulmonary artery and right inferior pulmonary vein, suggestive of encasement by tumor or stricturing from radiation changes, less likely due to pulmonary embolism. There is increasing R pleural effusion, likely malignant. Increasing consolidation RLL with narrowing of the bronchi with very little aeration to the RLL; again differential diagnoses includes recurrence of tumor and/or post-radiation changes with post-obstructive pneumonia. 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  -Continued on ASA, Lipitor, Coreg, Lisinopril.  -May consider addition of Aldactone as long as BP can tolerate. Will hold on diuretics for today due to increase in creatinine today 0.61 --> 1.03. Subjective:     No new complaints.       Objective: Patient Vitals for the past 8 hrs:   Temp Pulse Resp BP SpO2   11/29/19 1126 -- -- -- 133/68 --   11/29/19 0815 -- -- -- -- 97 %   11/29/19 0748 98 °F (36.7 °C) 94 18 114/66 97 %   11/29/19 0356 98.3 °F (36.8 °C) 97 17 107/69 97 %         Patient Vitals for the past 96 hrs:   Weight   11/29/19 1126 194 lb (88 kg)   11/29/19 0356 191 lb 3.2 oz (86.7 kg)   11/27/19 1334 189 lb (85.7 kg)   11/26/19 2151 189 lb 8 oz (86 kg)   11/26/19 1708 190 lb (86.2 kg)         Intake/Output Summary (Last 24 hours) at 11/29/2019 1136  Last data filed at 11/28/2019 2304  Gross per 24 hour   Intake 327 ml   Output 1975 ml   Net -1648 ml       Physical Exam:  General:  alert, cooperative, no distress, appears stated age  Neck:  supple  Lungs:  clear to auscultation bilaterally  Heart:  Regular rate and rhythm  Abdomen:  abdomen is soft without significant tenderness, masses, organomegaly or guarding  Extremities:  Atraumatic, no significant edema     Data Review:     Labs: Results:       Chemistry Recent Labs     11/29/19  0613 11/28/19  0740 11/27/19  0538 11/26/19  1605   * 141* 164* 314*    136 137 135*   K 4.3 3.8 3.7 4.3    106 103 102   CO2 26 27 29 27   BUN 23* 16 13 15   CREA 1.03 0.61 0.67 0.87   CA 8.9 8.9 9.2 9.2   AGAP 6 3 5 6   BUCR 22* 26* 19 17   AP  --   --  129* 155*   TP  --   --  7.7 7.3   ALB  --   --  2.5* 2.6*   GLOB  --   --  5.2* 4.7*   AGRAT  --   --  0.5* 0.6*      CBC w/Diff Recent Labs     11/27/19  0538 11/26/19  1605   WBC 8.9 10.0   RBC 4.25 4.23   HGB 11.7* 11.8*   HCT 37.2 36.6    396   GRANS 71 83*   LYMPH 17* 10*   EOS 3 1      Coagulation Recent Labs     11/29/19  0613 11/28/19  0905  11/26/19  1605   PTP  --   --   --  12.6   INR  --   --   --  1.0   APTT 40.0* 87.3*   < >  --     < > = values in this interval not displayed.        Lipid Panel Lab Results   Component Value Date/Time    Cholesterol, total 206 (H) 11/27/2019 05:38 AM    HDL Cholesterol 68 (H) 11/27/2019 05:38 AM    LDL, calculated 109.8 (H) 11/27/2019 05:38 AM    VLDL, calculated 28.2 11/27/2019 05:38 AM    Triglyceride 141 11/27/2019 05:38 AM    CHOL/HDL Ratio 3.0 11/27/2019 05:38 AM      Liver Enzymes Recent Labs     11/27/19  0538   TP 7.7   ALB 2.5*   *   SGOT 13      Thyroid Studies Lab Results   Component Value Date/Time    TSH 2.05 11/26/2019 04:05 PM

## 2019-11-29 NOTE — PROGRESS NOTES
Respiratory Therapy Assessment Care Plan    Patient:  Jamila Russo 75 y.o. female 11/29/2019 8:21 AM    NSTEMI (non-ST elevated myocardial infarction) ([facility] [address] [I21.4]  Pneumonitis [J18.9]  Occlusion of artery [I70.90]  Occlusion of vein [I82.90]      Chest X-RAY:   Results from Hospital Encounter encounter on 04/24/19   XR CHEST PORT    Impression IMPRESSION:    No significant interval change given differences in technique. Continued  ill-defined right hilar and lower lobe airspace process, which is nonspecific  but given history may represent sequela of prior right lung cancer with  radiation therapy. Superimposed pneumonia is possible, although no definite  change is seen. Results from [place] encounter on 12/30/18   XR CHEST PA LAT    Impression IMPRESSION:    1. Shallow inspiration with interval increase of right lower lobe airspace  process. Follow-up is recommended. Patient has known history of neoplasm with  possible radiation treatment. Results from Hospital Encounter encounter on 10/31/18   XR CHEST PA LAT    Impression IMPRESSION:    Patchy alveolar infiltrates in the mid to inferior right lung.           Vital Signs:     Visit Vitals  /66 (BP 1 Location: Left arm, BP Patient Position: At rest)   Pulse 94   Temp 98 °F (36.7 °C)   Resp 18   Ht 5' 6\" (1.676 m)   Wt 86.7 kg (191 lb 3.2 oz)   SpO2 97%   Breastfeeding No   BMI 30.86 kg/m²         Indications for treatment: COPD / SOB / Hypoxia      Plan of care: LABA ( home medication yupelri ) Oxygen        Goal: patient to receive ordered therapy

## 2019-11-29 NOTE — DISCHARGE SUMMARY
2 [facility]  Hospitalist Division    Discharge Summary    Patient: Marisol Gant MRN: [id number]  CSN: [id number]    YOB: 1955  Age: 75 y.o. Sex: female    DOA: 11/26/2019 LOS:  LOS: 3 days   Discharge Date:      Admission Diagnoses: NSTEMI (non-ST elevated myocardial infarction) ([facility] [I21.4]  Pneumonitis [J18.9]  Occlusion of artery [I70.90]  Occlusion of vein [I82.90]    Discharge Diagnoses:    Problem List as of 11/29/2019 Date Reviewed: 11/26/2019          Codes Class Noted - Resolved    Pneumonitis ICD-10-CM: J18.9  ICD-9-CM: 486  11/26/2019 - Present    Overview Signed 11/26/2019  7:28 PM by Caitlyn Jara,      Post-Obstructive Pneumonia vs. Radiation Pneumonitis             Arterial occlusion ICD-10-CM: I70.90  ICD-9-CM: 444.9  11/26/2019 - Present    Overview Signed 11/26/2019  7:29 PM by Caitlyn Jara,      Right Lower Lobe Artery Occlusion and Right Inferior Pulmonary Vein Occlusion possibly due to compression by Lung Mass. * (Principal) NSTEMI (non-ST elevated myocardial infarction) [facility]) ICD-10-CM: I21.4  ICD-9-CM: 410.70  11/26/2019 - Present        COPD (chronic obstructive pulmonary disease) ([facility] ICD-10-CM: J44.9  ICD-9-CM: 496  11/26/2019 - Present        Diabetes mellitus, type 2 ([facility] ICD-10-CM: E11.9  ICD-9-CM: 250.00  11/26/2019 - Present        HTN (hypertension) ICD-10-CM: I10  ICD-9-CM: 401.9  11/26/2019 - Present        Occlusion of artery ICD-10-CM: I70.90  ICD-9-CM: 444.9  11/26/2019 - Present        Occlusion of vein ICD-10-CM: I82.90  ICD-9-CM: 453.9  11/26/2019 - Present              \"Tawanda Hawley is a 75 y.o. -american female who presents to [facility] with complaint of Shortness of Breath.  Patient states that she has been having worsening shortness of breath over the last 2 weeks that she had been treating with Nebulized breathing treatments.  Patient states that her breathing improved over the last 3 days. [facility]iel Ingram, Patient went to see her Oncologist and was referred to [facility] when she mentioned that she had been having an elevated heart rate and shortness of breath.  Patient reports that she was diagnosed with Lung Cancer and completed her Chemotherapy and Radiation Treatments in 10/2019.  Patient also complains of increased anxiety with some nausea and vomiting in the AM for the last few weeks. Goldie Callahan reports that she has been having difficult breathing when she sleeps laying flat and that her legs have been having increasing swelling over the last 5 days.  Patient also reports worsening exertional dyspnea over greater than 30 days, but denies chest pain/pressure.  Patient also remarks that her right leg is more swollen than her left and reports that she had a Fall approximately 1 month ago. Bin Harkins, Patient's Lung Cancer was located on her Left Side.     CTA performed in [facility] showed new occlusion of Right Lower Lobe Artery and Right Inferior Pulmonary Vein.  Likewise, Left Lung appears to have increased consolidation consistent with Post-Obstructive PNA or Radiation Pneumonitis.  Concerns for mass effect causing both issues.  Troponin noted to be 3.17 in [facility].     Patient is admitted for management of NSTEMI and further evaluation of possible return of Lung Tumor with compression of vasculature and possible Post-Obstructive Pneumonia. \"     Heme oncology consulted, Cardiology consulted. She received heparin gtt for tx of NSTEMI, as well as additional cardiac medications, with a down trend of her troponin and improvement of symptoms. Underwent nuclear stress test; results reviewed by cardiology. Patient is to follow up with Cardiology in 3-5 days for outpatient cardiac catheterization. Patient agreeable and understands. CT results significant for pleural effusions and outpatient thoracentesis discussed with patient; asymptomatic, on RA, no distress.   She is to follow up with Dr. Kathy Galeas her pulmonologist for consideration of outpatient thoracentesis. HF medications were started and prescriptions provided. Norvasc discontinued and Aldactone should be considered given low EF, and if BP can tolerate, consider adding as outpatient. Provided abx (levaquin) for concerns of post-obstructive PNA on chest CT. Total of 10 days including inpatient abx. A1C 10.1, further diabetic management discussed and can follow up with PCP for long term management. Lasix held on day of discharge due to worsening renal function, but can be continued as an outpatient. She is to follow up with PCP for repeat renal function test.  VSS. Ready for discharge.     Physical Exam:  General appearance: alert, cooperative, no distress, appears stated age, chronically ill appearing  Head: Normocephalic, without obvious abnormality, atraumatic  Lungs: course breath sounds bilat, adequate air exchange, no resp distress  Heart: regular rate and rhythm, S1, S2 normal, no murmur, click, rub or gallop  Abdomen: soft, non-tender. Bowel sounds normal. No masses,  no organomegaly  Extremities: extremities normal, atraumatic, no cyanosis or edema  Skin: Skin color, texture, turgor normal. No rashes or lesions  Neurologic: Grossly normal  PSY: Mood and affect normal, appropriately behaved     Significant Diagnostic Studies:      All lab results for the last 24 hours reviewed.     No results found. No results found. Discharge Medications:     Current Discharge Medication List      START taking these medications    Details   aspirin 81 mg chewable tablet Take 1 Tab by mouth daily. Qty: 30 Tab, Refills: 0      atorvastatin (LIPITOR) 20 mg tablet Take 1 Tab by mouth nightly. Qty: 30 Tab, Refills: 0      carvedilol (COREG) 3.125 mg tablet Take 1 Tab by mouth two (2) times daily (with meals). Qty: 60 Tab, Refills: 0      lisinopril (PRINIVIL, ZESTRIL) 5 mg tablet Take 1 Tab by mouth daily.   Qty: 30 Tab, Refills: 0      levoFLOXacin (LEVAQUIN) 750 mg tablet Take 1 Tab by mouth daily. Qty: 7 Tab, Refills: 0         CONTINUE these medications which have CHANGED    Details   furosemide (LASIX) 20 mg tablet Take 1 Tab by mouth daily. Qty: 30 Tab, Refills: 0         CONTINUE these medications which have NOT CHANGED    Details   insulin glargine (LANTUS U-100 INSULIN) 100 unit/mL injection 20 Units by SubCUTAneous route daily. Indications: type 2 diabetes mellitus      revefenacin (YUPELRI) 175 mcg/3 mL nebu nebulizer solution 175 mcg by Nebulization route daily. OTHER       budesonide (PULMICORT) 0.25 mg/2 mL nbsp by Nebulization route. potassium chloride SR (KLOR-CON 10) 10 mEq tablet Take  by mouth.      predniSONE (DELTASONE) 5 mg tablet Take 5 mg by mouth. inhalational spacing device 1 Each by Does Not Apply route as needed. Qty: 1 Device, Refills: 0      insulin lispro protamin/lispro (HUMALOG MIX 75-25 KWIKPEN SC) 50 Units by SubCUTAneous route two (2) times a day.          STOP taking these medications       amLODIPine (NORVASC) 5 mg tablet Comments:   Reason for Stopping:               Activity: Activity as tolerated    Diet: Cardiac Diet    Wound Care: None needed    Follow-up: PCP, Cardiology, Pulmonology, Oncology, Radiation oncology     Discharge time: >35 Minutes     Jose Vaughn  [facility] [address]  Pager: [phone]  Office: [phone]    11/29/2019, 2:27 PM

## 2019-11-29 NOTE — PROGRESS NOTES
Bedside and Verbal shift change report given to Shelley ANDERSON (oncoming nurse) by Ludivina Martin RN   (offgoing nurse). Report included the following information SBAR, Kardex, MAR and Recent Results.

## 2019-11-29 NOTE — DISCHARGE SUMMARY
[address] [place], [place] [address]pec\[facility] [place] [facility]\""ty Group Hospitalist Division Discharge Summary Patient: Romulo Mcgee MRN: [id number]  CSN: [id number] YOB: 1955  Age: 75 y.o. Sex: female DOA: 11/26/2019 LOS:  LOS: 3 days   Discharge Date:   
 
Admission Diagnoses: NSTEMI (non-ST elevated myocardial infarction) ([facility] [I21.4] Pneumonitis [J18.9] Occlusion of artery [I70.90] Occlusion of vein [I82.90] Discharge Diagnoses:   
Problem List as of 11/29/2019 Date Reviewed: 11/26/2019 Codes Class Noted - Resolved Pneumonitis ICD-10-CM: J18.9 ICD-9-CM: 758  11/26/2019 - Present Overview Signed 11/26/2019  7:28 PM by Mikey Contreras,  Post-Obstructive Pneumonia vs. Radiation Pneumonitis Arterial occlusion ICD-10-CM: I70.90 ICD-9-CM: 444.9  11/26/2019 - Present Overview Signed 11/26/2019  7:29 PM by Mikey Contreras,  Right Lower Lobe Artery Occlusion and Right Inferior Pulmonary Vein Occlusion possibly due to compression by Lung Mass. * (Principal) NSTEMI (non-ST elevated myocardial infarction) ([facility] ICD-10-CM: I21.4 ICD-9-CM: 410.70  11/26/2019 - Present COPD (chronic obstructive pulmonary disease) ([facility]) ICD-10-CM: J44.9 ICD-9-CM: 637  11/26/2019 - Present Diabetes mellitus, type 2 ([facility] ICD-10-CM: E11.9 ICD-9-CM: 250.00  11/26/2019 - Present HTN (hypertension) ICD-10-CM: I10 
ICD-9-CM: 401.9  11/26/2019 - Present Occlusion of artery ICD-10-CM: I70.90 ICD-9-CM: 444.9  11/26/2019 - Present Occlusion of vein ICD-10-CM: I82.90 ICD-9-CM: 453.9  11/26/2019 - Present Discharge Condition: Stable Discharge To: Home Consults: Cardiology and Hematology/Oncology Procedures: None Hospital Course: \"Tawanda Hawley is a 75 y.o. -american female who presents to [facility] with complaint of Shortness of Breath.  Patient states that she has been having worsening shortness of breath over the last 2 weeks that she had been treating with Nebulized breathing treatments.  Patient states that her breathing improved over the last 3 days. [facility], Patient went to see her Oncologist and was referred to [facility] ER when she mentioned that she had been having an elevated heart rate and shortness of breath.  Patient reports that she was diagnosed with Lung Cancer and completed her Chemotherapy and Radiation Treatments in 10/2019.  Patient also complains of increased anxiety with some nausea and vomiting in the AM for the last few weeks. Stefani Conde reports that she has been having difficult breathing when she sleeps laying flat and that her legs have been having increasing swelling over the last 5 days.  Patient also reports worsening exertional dyspnea over greater than 30 days, but denies chest pain/pressure.  Patient also remarks that her right leg is more swollen than her left and reports that she had a Fall approximately 1 month ago. Kamala Keller, Patient's Lung Cancer was located on her Left Side. 
  
CTA performed in [facility] ER showed new occlusion of Right Lower Lobe Artery and Right Inferior Pulmonary Vein.  Likewise, Left Lung appears to have increased consolidation consistent with Post-Obstructive PNA or Radiation Pneumonitis.  Concerns for mass effect causing both issues.  Troponin noted to be 3.17 in [facility] ER. 
  
Patient is admitted for management of NSTEMI and further evaluation of possible return of Lung Tumor with compression of vasculature and possible Post-Obstructive Pneumonia. \" 
  
Heme oncology consulted, Cardiology consulted. She received heparin gtt for tx of NSTEMI with a down trend of her troponin and improvement of symptoms. Underwent nuclear stress test; results reviewed by cardiology. Patient is to follow up with Cardiology in 3-5 days for outpatient cardiac catheterization. Patient agreeable and understands.   CT results significant for pleural effusions and outpatient thoracentesis discussed with patient; asymptomatic, on RA, no distress. She is to follow up with Dr. Luis Cortez her pulmonologist for consideration of outpatient thoracentesis. HF medications were started and prescriptions provided. Norvasc discontinued and Aldactone should be considered given low EF, and if BP can tolerate. A1C 10.1, further diabetic management discussed and can follow up with PCP for long term management. Lasix held on day of discharge due to worsening renal function, but can be continued as an outpatient. She is to follow up with PCP for repeat renal function test.  VSS. Ready for discharge. Physical Exam: 
General appearance: alert, cooperative, no distress, appears stated age, chronically ill appearing Head: Normocephalic, without obvious abnormality, atraumatic Lungs: clear to auscultation bilaterally, no resp distress Heart: regular rate and rhythm, S1, S2 normal, no murmur, click, rub or gallop Abdomen: soft, non-tender. Bowel sounds normal. No masses,  no organomegaly Extremities: extremities normal, atraumatic, no cyanosis or edema Skin: Skin color, texture, turgor normal. No rashes or lesions Neurologic: Grossly normal 
PSY: Mood and affect normal, appropriately behaved Significant Diagnostic Studies: All lab results for the last 24 hours reviewed. No results found. Discharge Medications:    
Current Discharge Medication List  
  
START taking these medications Details  
aspirin 81 mg chewable tablet Take 1 Tab by mouth daily. Qty: 30 Tab, Refills: 0  
  
atorvastatin (LIPITOR) 20 mg tablet Take 1 Tab by mouth nightly. Qty: 30 Tab, Refills: 0  
  
carvedilol (COREG) 3.125 mg tablet Take 1 Tab by mouth two (2) times daily (with meals). Qty: 60 Tab, Refills: 0  
  
lisinopril (PRINIVIL, ZESTRIL) 5 mg tablet Take 1 Tab by mouth daily. Qty: 30 Tab, Refills: 0 CONTINUE these medications which have NOT CHANGED Details  
insulin glargine (LANTUS U-100 INSULIN) 100 unit/mL injection 20 Units by SubCUTAneous route daily. Indications: type 2 diabetes mellitus  
  
revefenacin (YUPELRI) 175 mcg/3 mL nebu nebulizer solution 175 mcg by Nebulization route daily. OTHER   
  
budesonide (PULMICORT) 0.25 mg/2 mL nbsp by Nebulization route. furosemide (LASIX) 20 mg tablet Take 20 mg by mouth daily. potassium chloride SR (KLOR-CON 10) 10 mEq tablet Take  by mouth.  
  
predniSONE (DELTASONE) 5 mg tablet Take 5 mg by mouth. inhalational spacing device 1 Each by Does Not Apply route as needed. Qty: 1 Device, Refills: 0  
  
insulin lispro protamin/lispro (HUMALOG MIX 75-25 KWIKPEN SC) 50 Units by SubCUTAneous route two (2) times a day. STOP taking these medications  
  
 amLODIPine (NORVASC) 5 mg tablet Comments:  
Reason for Stopping:   
   
  
 
 
Activity: Activity as tolerated Diet: Cardiac Diet Wound Care: None needed Follow-up: PCP, Pulmonology, Oncology, Radiation oncology Discharge time: >35 Minutes Darrell Rich PA-C [facility] Pager: [phone] Office: [phone] 
 
11/29/2019, 2:13 PM

## 2019-11-29 NOTE — DISCHARGE INSTRUCTIONS
Patient Education        Learning About ACE Inhibitors  Introduction    ACE (angiotensin-converting enzyme) inhibitors stop the release of an enzyme. This enzyme makes your blood vessels smaller. Without it, your blood vessels relax and get bigger. This lowers your blood pressure. These medicines also increase how much water and salt go into your urine. This also lowers blood pressure. You may take this kind of medicine if you have high blood pressure. Or you may take it if you have heart problems, kidney problems, or diabetes. If you have coronary artery disease, this medicine can help prevent heart attacks and strokes. Examples  · Benazepril (Lotensin)  · Lisinopril (Prinivil, Zestril)  · Ramipril (Altace)  This is not a complete list.  Possible side effects  Side effects may include:  · A cough. · Low blood pressure. This can make you feel dizzy or weak. · Too much potassium in your body. · Swelling. This may be a sign of an allergic reaction. You may have other side effects or reactions not listed here. Check the information that comes with your medicine. What to know about taking this medicine  · ACE inhibitors can cause a dry cough. Talk to your doctor if you have a dry cough. You may need a different medicine. · These medicines can cause an allergic reaction. This can cause a little swelling. Or it can cause red bumps on your skin that hurt. In rare cases, the swelling may make it hard for you to breathe. · Do not take this medicine if you are pregnant. And don't take it if you plan to become pregnant. · Take your medicines exactly as prescribed. Call your doctor if you think you are having a problem with your medicine. · Check with your doctor or pharmacist before you use any other medicines. This includes over-the-counter medicines. Make sure your doctor knows all of the medicines, vitamins, herbal products, and supplements you take. Taking some medicines together can cause problems.   · You may need regular blood tests. Where can you learn more? Go to http://tarik-maddy.info/. Enter P050 in the search box to learn more about \"Learning About ACE Inhibitors. \"  Current as of: April 9, 2019  Content Version: 12.2  © 6960-1932 [employer]. Care instructions adapted under license by [employer] (which disclaims liability or warranty for this information). If you have questions about a medical condition or this instruction, always ask your healthcare professional. [place] [address] any warranty or liability for your use of this information. Patient Education        Chronic Obstructive Pulmonary Disease (COPD): Care Instructions  Your Care Instructions    Chronic obstructive pulmonary disease (COPD) is a general term for a group of lung diseases, including emphysema and chronic bronchitis. People with COPD have decreased airflow in and out of the lungs, which makes it hard to breathe. The airways also can get clogged with thick mucus. Cigarette smoking is a major cause of COPD. Although there is no cure for COPD, you can slow its progress. Following your treatment plan and taking care of yourself can help you feel better and live longer. Follow-up care is a key part of your treatment and safety. Be sure to make and go to all appointments, and call your doctor if you are having problems. It's also a good idea to know your test results and keep a list of the medicines you take. How can you care for yourself at home?   Staying healthy    · Do not smoke. This is the most important step you can take to prevent more damage to your lungs. If you need help quitting, talk to your doctor about stop-smoking programs and medicines. These can increase your chances of quitting for good.     · Avoid colds and flu. Get a pneumococcal vaccine shot. If you have had one before, ask your doctor whether you need a second dose.  Get the flu vaccine every fall. If you must be around people with colds or the flu, wash your hands often.     · Avoid secondhand smoke, air pollution, and high altitudes. Also avoid cold, dry air and hot, humid air. Stay at home with your windows closed when air pollution is bad.    Medicines and oxygen therapy    · Take your medicines exactly as prescribed. Call your doctor if you think you are having a problem with your medicine.     · You may be taking medicines such as:  ? Bronchodilators. These help open your airways and make breathing easier. Bronchodilators are either short-acting (work for 6 to 9 hours) or long-acting (work for 24 hours). You inhale most bronchodilators, so they start to act quickly. Always carry your quick-relief inhaler with you in case you need it while you are away from home. ? Corticosteroids (prednisone, budesonide). These reduce airway inflammation. They come in pill or inhaled form. You must take these medicines every day for them to work well.     · A spacer may help you get more inhaled medicine to your lungs. Ask your doctor or pharmacist if a spacer is right for you. If it is, ask how to use it properly.     · Do not take any vitamins, over-the-counter medicine, or herbal products without talking to your doctor first.     · If your doctor prescribed antibiotics, take them as directed. Do not stop taking them just because you feel better. You need to take the full course of antibiotics.     · Oxygen therapy boosts the amount of oxygen in your blood and helps you breathe easier. Use the flow rate your doctor has recommended, and do not change it without talking to your doctor first.   Activity    · Get regular exercise. Walking is an easy way to get exercise. Start out slowly, and walk a little more each day.     · Pay attention to your breathing.  You are exercising too hard if you cannot talk while you are exercising.     · Take short rest breaks when doing household chores and other activities.     · Learn breathing methods--such as breathing through pursed lips--to help you become less short of breath.     · If your doctor has not set you up with a pulmonary rehabilitation program, talk to him or her about whether rehab is right for you. Rehab includes exercise programs, education about your disease and how to manage it, help with diet and other changes, and emotional support. Diet    · Eat regular, healthy meals. Use bronchodilators about 1 hour before you eat to make it easier to eat. Eat several small meals instead of three large ones. Drink beverages at the end of the meal. Avoid foods that are hard to chew.     · Eat foods that contain protein so that you do not lose muscle mass.     · Talk with your doctor if you gain too much weight or if you lose weight without trying.    Mental health    · Talk to your family, friends, or a therapist about your feelings. It is normal to feel frightened, angry, hopeless, helpless, and even guilty. Talking openly about bad feelings can help you cope. If these feelings last, talk to your doctor. When should you call for help? Call 911 anytime you think you may need emergency care. For example, call if:    · You have severe trouble breathing.    Call your doctor now or seek immediate medical care if:    · You have new or worse trouble breathing.     · You cough up blood.     · You have a fever.    Watch closely for changes in your health, and be sure to contact your doctor if:    · You cough more deeply or more often, especially if you notice more mucus or a change in the color of your mucus.     · You have new or worse swelling in your legs or belly.     · You are not getting better as expected. Where can you learn more? Go to http://tarik-maddy.info/. David Ayala in the search box to learn more about \"Chronic Obstructive Pulmonary Disease (COPD): Care Instructions. \"  Current as of: June 9, 2019  Content Version: 12.2  © 8636-7195 [employer]. Care instructions adapted under license by [employer] (which disclaims liability or warranty for this information). If you have questions about a medical condition or this instruction, always ask your healthcare professional. [address] any warranty or liability for your use of this information. Patient Education        Learning About Diabetes and Coronary Artery Disease  How are diabetes and heart disease connected? Many people think diabetes and heart disease go hand in hand. But having diabetes doesn't have to mean that you are going to have a heart attack someday. Healthy living can help prevent many of the problems that come with both diabetes and heart disease. For some people, diabetes can cause problems in your body that may lead to heart disease. Diabetes can make the problems of heart disease worse. Experts do not fully understand how diabetes affects the heart. Many things can lead to heart disease, including high blood sugar, insulin resistance, high cholesterol, and high blood pressure. But lifestyle and genetics may also affect a person's risk. But here's the good news: The good things you're doing to stay healthy with diabetes--eating healthy foods, quitting smoking, getting exercise and more--are also helping your heart. What increases your risk for heart disease? When you have diabetes, your risk for heart disease is even higher if you have:  · High blood pressure, which pushes blood through the arteries with too much force. Over time, this damages the walls of the arteries. · High cholesterol, which causes the buildup of a kind of fat inside the blood vessel walls. This buildup can lower blood flow to the heart muscle and raise your risk for having a heart attack. · Kidney damage, which shares many of the risk factors for heart disease (such as high blood sugar, high blood pressure, and high cholesterol).   How can you keep your heart healthy when you have diabetes? Managing your diabetes and keeping your heart healthy are two sides of the same coin. Here are some things you can do. · Test your blood sugar levels and get your diabetes tests on schedule. Try to keep your numbers within your target range. · Keep track of your blood pressure. Your doctor will give you a goal that's right for you. If your blood pressure is high, your treatment may also include medicine. Changes in your lifestyle, such as staying at a healthy weight, may also help you lower your blood pressure. · Eat heart-healthy foods. These include fruits, vegetables, whole grains, fish, and low-fat or nonfat dairy foods. Limit sodium, alcohol, and sweets. · If your doctor recommends it, get more exercise. Walking is a good choice. Bit by bit, increase the amount you walk every day. Try for at least 30 minutes on most days of the week. · Do not smoke. Smoking can make diabetes and heart disease worse. If you need help quitting, talk to your doctor about stop-smoking programs and medicines. These can increase your chances of quitting for good. · Your doctor may talk with you about taking medicines for your heart. For example, your doctor may suggest taking a statin or daily aspirin. Where can you learn more? Go to http://tarik-maddy.info/. Enter Z062 in the search box to learn more about \"Learning About Diabetes and Coronary Artery Disease. \"  Current as of: April 16, 2019  Content Version: 12.2  © 2248-2525 [employer]. Care instructions adapted under license by [employer] (which disclaims liability or warranty for this information). If you have questions about a medical condition or this instruction, always ask your healthcare professional. [place] [address] any warranty or liability for your use of this information.          Patient Education        Heart Attack: Care Instructions  Your Care Instructions    A heart attack (myocardial infarction, or MI) occurs when one or more of the coronary arteries, which supply the heart with oxygen-rich blood, is blocked. A blockage usually occurs when plaque inside the artery breaks open and a blood clot forms in the artery. After a heart attack, you may be worried about your future. Over the next several weeks, your heart will start to heal. Though it can be hard to break old habits, you can prevent another heart attack by making some lifestyle changes and by taking medicines. You may use this information for ideas about what to do at home to speed your recovery. Follow-up care is a key part of your treatment and safety. Be sure to make and go to all appointments, and call your doctor if you are having problems. It's also a good idea to know your test results and keep a list of the medicines you take. How can you care for yourself at home? Activity    · Until your doctor says it is okay, do not do strenuous exercise. And do not lift, pull, or push anything heavy. Ask your doctor what types of activities are safe for you.     · If your doctor has not set you up with a cardiac rehabilitation (rehab) program, talk to him or her about whether that is right for you. Cardiac rehab includes supervised exercise. It also includes help with diet and lifestyle changes and emotional support. It may reduce your risk of future heart problems.     · Increase your activities slowly. Take short rest breaks when you get tired.     · If your doctor recommends it, get more exercise. Walking is a good choice. Bit by bit, increase the amount you walk every day. Try for at least 30 minutes on most days of the week. You also may want to swim, bike, or do other activities.  Talk with your doctor before you start an exercise program to make sure it is safe for you.     · Ask your doctor when you can drive, go back to work, and do other daily activities again.     · You can have sex as soon as you feel ready for it. Often this means when you can easily walk around or climb stairs. Talk with your doctor if you have any concerns. If you are taking nitroglycerin, do not take erection-enhancing medicine such as sildenafil (Viagra), tadalafil (Cialis), or vardenafil (Levitra) .    Lifestyle changes    · Do not smoke. Smoking increases your risk of another heart attack. If you need help quitting, talk to your doctor about stop-smoking programs and medicines. These can increase your chances of quitting for good.     · Eat a heart-healthy diet that is low in saturated fat and salt, and is full of fruits, vegetables and whole-grains. Eat at least two servings of fish each week. You may get more details about how to eat healthy. But these tips can help you get started.     · Stay at a healthy weight, or lose weight if you need to. Medicines    · Be safe with medicines. Take your medicines exactly as prescribed. Call your doctor if you think you are having a problem with your medicine. You will get more details on the specific medicines your doctor prescribes. Do not stop taking your medicine unless your doctor tells you to. Not taking your medicine might raise your risk of having another heart attack.     · You may need several medicines to help lower your risk of another heart attack. These include:  ? Blood pressure medicines such as angiotensin-converting enzyme (ACE) inhibitors, ARBs (angiotensin II receptor blockers), and beta-blockers. ? Cholesterol medicine called statins. ? Aspirin and other blood thinners. These prevent blood clots that can cause a heart attack.     · If your doctor has given you nitroglycerin, keep it with you at all times. If you have angina symptoms, such as chest pain or pressure, sit down and rest. Take the first dose of nitroglycerin as directed. If symptoms get worse or are not getting better within 5 minutes, call 911 right away. Stay on the phone.  The emergency  will tell you what to do.     · Do not take any over-the-counter medicines, vitamins, or herbal products without talking to your doctor first.    Staying healthy    · Manage other health conditions such as high blood pressure and diabetes.     · Avoid colds and flu. Get a pneumococcal vaccine shot. If you have had one before, ask your doctor whether you need another dose. Get the flu vaccine every year. If you must be around people with colds or flu, wash your hands often.     · Be sure to tell your doctor about any angina symptoms you have had, even if they went away. Pay attention to your angina symptoms. Know what is typical for you and learn how to control it. Know when to call for help.     · Talk to your family, friends, or a counselor about your feelings. It is normal to feel frightened, angry, hopeless, helpless, and even guilty. Talking openly about bad feelings can help you cope. If you have symptoms of depression, talk to your doctor. When should you call for help? Call 911 anytime you think you may need emergency care. For example, call if:    · You have symptoms of a heart attack. These may include:  ? Chest pain or pressure, or a strange feeling in the chest.  ? Sweating. ? Shortness of breath. ? Nausea or vomiting. ? Pain, pressure, or a strange feeling in the back, neck, jaw, or upper belly or in one or both shoulders or arms. ? Lightheadedness or sudden weakness. ? A fast or irregular heartbeat. After you call 911, the  may tell you to chew 1 adult-strength or 2 to 4 low-dose aspirin. Wait for an ambulance.  Do not try to drive yourself.     · You have angina symptoms (such as chest pain or pressure) that do not go away with rest or are not getting better within 5 minutes after you take a dose of nitroglycerin.     · You passed out (lost consciousness).     · You feel like you are having another heart attack.    Call your doctor now or seek immediate medical care if:    · You are having angina symptoms, such as chest pain or pressure, more often than usual, or the symptoms are different or worse than usual.     · You have new or increased shortness of breath.     · You are dizzy or lightheaded, or you feel like you may faint.    Watch closely for changes in your health, and be sure to contact your doctor if you have any problems. Where can you learn more? Go to http://tarik-maddy.info/. Enter [id number] in the search box to learn more about \"Heart Attack: Care Instructions. \"  Current as of: April 9, 2019  Content Version: 12.2  © 7904-0253 [employer]. Care instructions adapted under license by [employer] (which disclaims liability or warranty for this information). If you have questions about a medical condition or this instruction, always ask your healthcare professional. [place] [address] any warranty or liability for your use of this information. Patient Education        High Blood Pressure: Care Instructions  Overview    It's normal for blood pressure to go up and down throughout the day. But if it stays up, you have high blood pressure. Another name for high blood pressure is hypertension. Despite what a lot of people think, high blood pressure usually doesn't cause headaches or make you feel dizzy or lightheaded. It usually has no symptoms. But it does increase your risk of stroke, heart attack, and other problems. You and your doctor will talk about your risks of these problems based on your blood pressure. Your doctor will give you a goal for your blood pressure. Your goal will be based on your health and your age. Lifestyle changes, such as eating healthy and being active, are always important to help lower blood pressure. You might also take medicine to reach your blood pressure goal.  Follow-up care is a key part of your treatment and safety.  Be sure to make and go to all appointments, and call your doctor if you are having problems. It's also a good idea to know your test results and keep a list of the medicines you take. How can you care for yourself at home? Medical treatment  · If you stop taking your medicine, your blood pressure will go back up. You may take one or more types of medicine to lower your blood pressure. Be safe with medicines. Take your medicine exactly as prescribed. Call your doctor if you think you are having a problem with your medicine. · Talk to your doctor before you start taking aspirin every day. Aspirin can help certain people lower their risk of a heart attack or stroke. But taking aspirin isn't right for everyone, because it can cause serious bleeding. · See your doctor regularly. You may need to see the doctor more often at first or until your blood pressure comes down. · If you are taking blood pressure medicine, talk to your doctor before you take decongestants or anti-inflammatory medicine, such as ibuprofen. Some of these medicines can raise blood pressure. · Learn how to check your blood pressure at home. Lifestyle changes  · Stay at a healthy weight. This is especially important if you put on weight around the waist. Losing even 10 pounds can help you lower your blood pressure. · If your doctor recommends it, get more exercise. Walking is a good choice. Bit by bit, increase the amount you walk every day. Try for at least 30 minutes on most days of the week. You also may want to swim, bike, or do other activities. · Avoid or limit alcohol. Talk to your doctor about whether you can drink any alcohol. · Try to limit how much sodium you eat to less than 2,300 milligrams (mg) a day. Your doctor may ask you to try to eat less than 1,500 mg a day. · Eat plenty of fruits (such as bananas and oranges), vegetables, legumes, whole grains, and low-fat dairy products. · Lower the amount of saturated fat in your diet.  Saturated fat is found in animal products such as milk, cheese, and meat. Limiting these foods may help you lose weight and also lower your risk for heart disease. · Do not smoke. Smoking increases your risk for heart attack and stroke. If you need help quitting, talk to your doctor about stop-smoking programs and medicines. These can increase your chances of quitting for good. When should you call for help? Call  911 anytime you think you may need emergency care. This may mean having symptoms that suggest that your blood pressure is causing a serious heart or blood vessel problem. Your blood pressure may be over 180/120.   For example, call  911 if:    · You have symptoms of a heart attack. These may include:  ? Chest pain or pressure, or a strange feeling in the chest.  ? Sweating. ? Shortness of breath. ? Nausea or vomiting. ? Pain, pressure, or a strange feeling in the back, neck, jaw, or upper belly or in one or both shoulders or arms. ? Lightheadedness or sudden weakness. ? A fast or irregular heartbeat.     · You have symptoms of a stroke. These may include:  ? Sudden numbness, tingling, weakness, or loss of movement in your face, arm, or leg, especially on only one side of your body. ? Sudden vision changes. ? Sudden trouble speaking. ? Sudden confusion or trouble understanding simple statements. ? Sudden problems with walking or balance. ? A sudden, severe headache that is different from past headaches.     · You have severe back or belly pain.    Do not wait until your blood pressure comes down on its own.  Get help right away.   Call your doctor now or seek immediate care if:    · Your blood pressure is much higher than normal (such as 180/120 or higher), but you don't have symptoms.     · You think high blood pressure is causing symptoms, such as:  ? Severe headache.  ? Blurry vision.    Watch closely for changes in your health, and be sure to contact your doctor if:    · Your blood pressure measures higher than your doctor recommends at least 2 times. That means the top number is higher or the bottom number is higher, or both.     · You think you may be having side effects from your blood pressure medicine. Where can you learn more? Go to http://tarik-maddy.info/. Enter X206 in the search box to learn more about \"High Blood Pressure: Care Instructions. \"  Current as of: April 9, 2019  Content Version: 12.2  © 5396-1575 [employer]. Care instructions adapted under license by [employer] (which disclaims liability or warranty for this information). If you have questions about a medical condition or this instruction, always ask your healthcare professional. [address] any warranty or liability for your use of this information. Patient Education        Low Sodium Diet (2,000 Milligram): Care Instructions  Your Care Instructions    Too much sodium causes your body to hold on to extra water. This can raise your blood pressure and force your heart and kidneys to work harder. In very serious cases, this could cause you to be put in the hospital. It might even be life-threatening. By limiting sodium, you will feel better and lower your risk of serious problems. The most common source of sodium is salt. People get most of the salt in their diet from canned, prepared, and packaged foods. Fast food and restaurant meals also are very high in sodium. Your doctor will probably limit your sodium to less than 2,000 milligrams (mg) a day. This limit counts all the sodium in prepared and packaged foods and any salt you add to your food. Follow-up care is a key part of your treatment and safety. Be sure to make and go to all appointments, and call your doctor if you are having problems. It's also a good idea to know your test results and keep a list of the medicines you take. How can you care for yourself at home? Read food labels  · Read labels on cans and food packages.  The labels tell you how much sodium is in each serving. Make sure that you look at the serving size. If you eat more than the serving size, you have eaten more sodium. · Food labels also tell you the Percent Daily Value for sodium. Choose products with low Percent Daily Values for sodium. · Be aware that sodium can come in forms other than salt, including monosodium glutamate (MSG), sodium citrate, and sodium bicarbonate (baking soda). MSG is often added to Asian food. When you eat out, you can sometimes ask for food without MSG or added salt. Buy low-sodium foods  · Buy foods that are labeled \"unsalted\" (no salt added), \"sodium-free\" (less than 5 mg of sodium per serving), or \"low-sodium\" (less than 140 mg of sodium per serving). Foods labeled \"reduced-sodium\" and \"light sodium\" may still have too much sodium. Be sure to read the label to see how much sodium you are getting. · Buy fresh vegetables, or frozen vegetables without added sauces. Buy low-sodium versions of canned vegetables, soups, and other canned goods. Prepare low-sodium meals  · Cut back on the amount of salt you use in cooking. This will help you adjust to the taste. Do not add salt after cooking. One teaspoon of salt has about 2,300 mg of sodium. · Take the salt shaker off the table. · Flavor your food with garlic, lemon juice, onion, vinegar, herbs, and spices. Do not use soy sauce, lite soy sauce, steak sauce, onion salt, garlic salt, celery salt, mustard, or ketchup on your food. · Use low-sodium salad dressings, sauces, and ketchup. Or make your own salad dressings and sauces without adding salt. · Use less salt (or none) when recipes call for it. You can often use half the salt a recipe calls for without losing flavor. Other foods such as rice, pasta, and grains do not need added salt. · Rinse canned vegetables, and cook them in fresh water. This removes some--but not all--of the salt.   · Avoid water that is naturally high in sodium or that has been treated with water softeners, which add sodium. Call your local water company to find out the sodium content of your water supply. If you buy bottled water, read the label and choose a sodium-free brand. Avoid high-sodium foods  · Avoid eating:  ? Smoked, cured, salted, and canned meat, fish, and poultry. ? Ham, holland, hot dogs, and luncheon meats. ? Regular, hard, and processed cheese and regular peanut butter. ? Crackers with salted tops, and other salted snack foods such as pretzels, chips, and salted popcorn. ? Frozen prepared meals, unless labeled low-sodium. ? Canned and dried soups, broths, and bouillon, unless labeled sodium-free or low-sodium. ? Canned vegetables, unless labeled sodium-free or low-sodium. ? [place] fries, pizza, tacos, and other fast foods. ? Pickles, olives, ketchup, and other condiments, especially soy sauce, unless labeled sodium-free or low-sodium. Where can you learn more? Go to http://tarik-maddy.info/. Enter U657 in the search box to learn more about \"Low Sodium Diet (2,000 Milligram): Care Instructions. \"  Current as of: November 7, 2018  Content Version: 12.2  © 4363-9125 [employer]. Care instructions adapted under license by [email or web address] (which disclaims liability or warranty for this information). If you have questions about a medical condition or this instruction, always ask your healthcare professional. [place] [address] any warranty or liability for your use of this information. Patient Education        Pneumonia: Care Instructions  Your Care Instructions    Pneumonia is an infection of the lungs. Most cases are caused by infections from bacteria or viruses. Pneumonia may be mild or very severe. If it is caused by bacteria, you will be treated with antibiotics.  It may take a few weeks to a few months to recover fully from pneumonia, depending on how sick you were and whether your overall health is good.  Follow-up care is a key part of your treatment and safety. Be sure to make and go to all appointments, and call your doctor if you are having problems. It's also a good idea to know your test results and keep a list of the medicines you take. How can you care for yourself at home? · Take your antibiotics exactly as directed. Do not stop taking the medicine just because you are feeling better. You need to take the full course of antibiotics. · Take your medicines exactly as prescribed. Call your doctor if you think you are having a problem with your medicine. · Get plenty of rest and sleep. You may feel weak and tired for a while, but your energy level will improve with time. · To prevent dehydration, drink plenty of fluids, enough so that your urine is light yellow or clear like water. Choose water and other caffeine-free clear liquids until you feel better. If you have kidney, heart, or liver disease and have to limit fluids, talk with your doctor before you increase the amount of fluids you drink. · Take care of your cough so you can rest. A cough that brings up mucus from your lungs is common with pneumonia. It is one way your body gets rid of the infection. But if coughing keeps you from resting or causes severe fatigue and chest-wall pain, talk to your doctor. He or she may suggest that you take a medicine to reduce the cough. · Use a vaporizer or humidifier to add moisture to your bedroom. Follow the directions for cleaning the machine. · Do not smoke or allow others to smoke around you. Smoke will make your cough last longer. If you need help quitting, talk to your doctor about stop-smoking programs and medicines. These can increase your chances of quitting for good. · Take an over-the-counter pain medicine, such as acetaminophen (Tylenol), ibuprofen (Advil, Motrin), or naproxen (Aleve). Read and follow all instructions on the label.   · Do not take two or more pain medicines at the same time unless the doctor told you to. Many pain medicines have acetaminophen, which is Tylenol. Too much acetaminophen (Tylenol) can be harmful. · If you were given a spirometer to measure how well your lungs are working, use it as instructed. This can help your doctor tell how your recovery is going. · To prevent pneumonia in the future, talk to your doctor about getting a flu vaccine (once a year) and a pneumococcal vaccine (one time only for most people). When should you call for help? Call 911 anytime you think you may need emergency care. For example, call if:    · You have severe trouble breathing.    Call your doctor now or seek immediate medical care if:    · You cough up dark brown or bloody mucus (sputum).     · You have new or worse trouble breathing.     · You are dizzy or lightheaded, or you feel like you may faint.    Watch closely for changes in your health, and be sure to contact your doctor if:    · You have a new or higher fever.     · You are coughing more deeply or more often.     · You are not getting better after 2 days (48 hours).     · You do not get better as expected. Where can you learn more? Go to http://tarik-maddy.info/. Enter [id number] in the search box to learn more about \"Pneumonia: Care Instructions. \"  Current as of: June 9, 2019  Content Version: 12.2  © 5664-4732 [employer]. Care instructions adapted under license by [employer] (which disclaims liability or warranty for this information). If you have questions about a medical condition or this instruction, always ask your healthcare professional. [place] [address] any warranty or liability for your use of this information. Your A1C  was   Lab Results   Component Value Date/Time    Hemoglobin A1c 10.1 (H) 11/26/2019 04:05 PM     This lab test reflects that your blood sugar averaged 243 mg/dL over the past 3 months.   It is important to follow up with your provider on a routine basis to continue to evaluate your blood sugar discuss any necessary changes in treatment.

## 2019-11-30 RX ORDER — LISINOPRIL 5 MG/1
5 TABLET ORAL DAILY
Qty: 30 TAB | Refills: 0 | Status: SHIPPED | OUTPATIENT
Start: 2019-11-30 | End: 2019-12-10 | Stop reason: ALTCHOICE

## 2019-12-01 NOTE — ROUTINE PROCESS
0800-Assessment completed, call bell within reach, no distress noted. 1030-taken to [facility] for testing. 1300-returned from testing. 1640-discharge instructions given, verbalized understanding. Discharged home via wheelchair with sister.

## 2019-12-10 ENCOUNTER — OFFICE VISIT (OUTPATIENT)
Dept: CARDIOLOGY CLINIC | Age: 64
End: 2019-12-10

## 2019-12-10 VITALS
HEIGHT: 66 IN | WEIGHT: 185 LBS | DIASTOLIC BLOOD PRESSURE: 85 MMHG | BODY MASS INDEX: 29.73 KG/M2 | SYSTOLIC BLOOD PRESSURE: 160 MMHG | OXYGEN SATURATION: 96 % | HEART RATE: 93 BPM

## 2019-12-10 DIAGNOSIS — I25.10 CORONARY ARTERY DISEASE DUE TO LIPID RICH PLAQUE: Primary | ICD-10-CM

## 2019-12-10 DIAGNOSIS — E78.00 PURE HYPERCHOLESTEROLEMIA: ICD-10-CM

## 2019-12-10 DIAGNOSIS — I25.83 CORONARY ARTERY DISEASE DUE TO LIPID RICH PLAQUE: Primary | ICD-10-CM

## 2019-12-10 DIAGNOSIS — I10 ESSENTIAL HYPERTENSION WITH GOAL BLOOD PRESSURE LESS THAN 140/90: ICD-10-CM

## 2019-12-10 RX ORDER — SODIUM CHLORIDE 0.9 % (FLUSH) 0.9 %
5-40 SYRINGE (ML) INJECTION EVERY 8 HOURS
Status: CANCELLED | OUTPATIENT
Start: 2019-12-10

## 2019-12-10 RX ORDER — SODIUM CHLORIDE 0.9 % (FLUSH) 0.9 %
5-40 SYRINGE (ML) INJECTION AS NEEDED
Status: CANCELLED | OUTPATIENT
Start: 2019-12-10

## 2019-12-10 RX ORDER — SPIRONOLACTONE 25 MG/1
12.5 TABLET ORAL DAILY
Qty: 30 TAB | Refills: 6 | Status: ON HOLD | OUTPATIENT
Start: 2019-12-10 | End: 2019-12-18

## 2019-12-10 RX ORDER — SODIUM CHLORIDE 9 MG/ML
1000 INJECTION, SOLUTION INTRAVENOUS CONTINUOUS
Status: CANCELLED | OUTPATIENT
Start: 2019-12-10

## 2019-12-10 RX ORDER — ASPIRIN 325 MG
162 TABLET ORAL DAILY
Status: CANCELLED | OUTPATIENT
Start: 2019-12-11

## 2019-12-10 RX ORDER — LORAZEPAM 0.5 MG/1
TABLET ORAL
Status: ON HOLD | COMMUNITY
End: 2019-12-18

## 2019-12-10 NOTE — PROGRESS NOTES
[facility]    Ms. Ozzie Rodriguez is 55-year-old female with a history of lung cancer, cardiomyopathy, COPD, diabetes, hypertension    Patient is here today for follow-up appointment. She was recently admitted to the hospital with lower extremity edema and dyspnea. During the work-up she was found to have a non-ST elevation myocardial infarction and newly diagnosed severe LV dysfunction. Patient underwent stress test which was abnormal.  Patient was started on appropriate medical management and was asked to follow-up with me. Since she has gone home, she has been feeling okay. She denies any chest pain or chest tightness. Her shortness of breath is improving. She denies any lower extremity swelling. She denies any side effect from the medication. She denies presyncope or syncope  Denies any nausea, vomiting, abdominal pain, fever, chills, sputum production. No hematuria or other bleeding complaints    Past Medical History:   Diagnosis Date    Cardiomyopathy [facility]     LVEF 25% (11/19)    COPD (chronic obstructive pulmonary disease) [facility]     Diabetes mellitus (HCC)     Emphysema of lung ([facility]     Hypertension     [facility]        Past Surgical History:   Procedure Laterality Date    ABDOMEN SURGERY PROC UNLISTED  1972    HX APPENDECTOMY      HX TONSILLECTOMY         Current Outpatient Medications   Medication Sig    LORazepam (ATIVAN) 0.5 mg tablet Take  by mouth.  furosemide (LASIX) 20 mg tablet Take 1 Tab by mouth daily.  aspirin 81 mg chewable tablet Take 1 Tab by mouth daily.  atorvastatin (LIPITOR) 20 mg tablet Take 1 Tab by mouth nightly.  carvedilol (COREG) 3.125 mg tablet Take 1 Tab by mouth two (2) times daily (with meals).  potassium chloride SR (KLOR-CON 10) 10 mEq tablet Take 1 Tab by mouth daily.  insulin glargine (LANTUS U-100 INSULIN) 100 unit/mL injection 20 Units by SubCUTAneous route daily. Indications: type 2 diabetes mellitus    revefenacin (YUPELRI) 175 mcg/3 mL nebu nebulizer solution 175 mcg by Nebulization route daily.  OTHER     budesonide (PULMICORT) 0.25 mg/2 mL nbsp by Nebulization route.  predniSONE (DELTASONE) 5 mg tablet Take 5 mg by mouth.  inhalational spacing device 1 Each by Does Not Apply route as needed.  insulin lispro protamin/lispro (HUMALOG MIX 75-25 KWIKPEN SC) 50 Units by SubCUTAneous route two (2) times a day. No current facility-administered medications for this visit. Allergies and Sensitivities:  Allergies   Allergen Reactions    Lisinopril Cough       Family History:  No family history on file. Social History:  Social History     Tobacco Use    Smoking status: Former Smoker     Years: 24.00     Last attempt to quit: 1978     Years since quittin.4    Smokeless tobacco: Never Used   Substance Use Topics    Alcohol use: No    Drug use: No     She  reports that she quit smoking about 41 years ago. She quit after 24.00 years of use. She has never used smokeless tobacco.  She  reports no history of alcohol use. Review of Systems:  Cardiac symptoms as noted above in HPI. All others negative. Denies fatigue, malaise, skin rash, joint pain, blurring vision, photophobia, neck pain, hemoptysis, chronic cough, nausea, vomiting, hematuria, burning micturition, BRBPR, chronic headaches. Physical Exam:  BP Readings from Last 3 Encounters:   12/10/19 160/85   19 128/66   19 180/88         Pulse Readings from Last 3 Encounters:   12/10/19 93   19 94   19 87          Wt Readings from Last 3 Encounters:   12/10/19 185 lb (83.9 kg)   19 194 lb (88 kg)   19 198 lb (89.8 kg)       Constitutional: Oriented to person, place, and time. HENT: Head: Normocephalic and atraumatic. Neck: No JVD present. Cardiovascular: Regular rhythm.    No murmur, gallop or rubs appreciated  Lung: Breath sounds normal. No respiratory distress. No ronchi or rales appreciated  Abdominal: No tenderness. No rebound and no guarding. Musculoskeletal: There is no lower extremity edema. No cynosis    Review of Data  LABS:   Lab Results   Component Value Date/Time    Sodium 139 11/29/2019 06:13 AM    Potassium 4.3 11/29/2019 06:13 AM    Chloride 107 11/29/2019 06:13 AM    CO2 26 11/29/2019 06:13 AM    Glucose 220 (H) 11/29/2019 06:13 AM    BUN 23 (H) 11/29/2019 06:13 AM    Creatinine 1.03 11/29/2019 06:13 AM     Lipids Latest Ref Rng & Units 11/27/2019   Chol, Total <200 MG/(H)   HDL 40 - 60 MG/DL 68(H)   LDL 0 - 100 MG/. 8(H)   Trig <150 MG/   Chol/HDL Ratio 0 - 5.0   3.0   Some recent data might be hidden     Lab Results   Component Value Date/Time    ALT (SGPT) 21 11/27/2019 05:38 AM     Lab Results   Component Value Date/Time    Hemoglobin A1c 10.1 (H) 11/26/2019 04:05 PM       EKG    ECHO (12/19)  Left Ventricle Normal cavity size and wall thickness. Severe systolic dysfunction. The estimated ejection fraction is 26 - 30%. Visually measured ejection fraction. Global hypokinesis observed. There is severe (grade 3) left ventricular diastolic dysfunction. Elevated left ventricular diastolic pressure. Wall Scoring The left ventricular wall motion is globally hypokinetic. Left Atrium Mildly dilated left atrium. Left Atrium volume index is 40.14 mL/m2. Right Ventricle Mildly dilated right ventricle. Reduced systolic function. Right Atrium Normal cavity size. Aortic Valve Trileaflet valve structure and no stenosis. Mild aortic valve regurgitation. Mitral Valve No stenosis. Mitral annular dilatation. Moderate regurgitation. Tricuspid Valve No stenosis. Non-specific thickening. Mild tricuspid valve regurgitation. Pulmonary arterial systolic pressure is 54.7 mmHg. Mild pulmonary hypertension. STRESS TEST (12/19)  · Baseline ECG: Normal EKG, non-specific ST-T wave abnormalities.   · Gated SPECT: Left ventricular function post-stress was abnormal. Calculated ejection fraction is 30%. · Left ventricular perfusion is equivocal.  · There is small area of mildly intense reversible defect that involves distal anterior wall. This could represent mild ischemia or artifact. Clinical correlation is recommended    CATHETERIZATION    IMPRESSION & PLAN:  Ms. Samuel Suh 49-year-old female with multiple medical problem    Cardiomyopathy:  Patient has been diagnosed with severe LV dysfunction with EF 25% in December 2019. Abnormal stress test in December 2019  Currently patient is on Lasix, Coreg. Patient had excessive cough with lisinopril in the past.  Will start patient on Entresto twice daily. NYHA class II  We will discontinue Lasix and start using Aldactone 12.5 mg daily. Will stop potassium supplement. BMP will be checked  Salt and fluid restriction discussed with the patient  DW patient regarding management strategy which includes medical management vs. Ischemia evaluation (Invasive). Risk, benefit and alternatives of each strategy discussed in detail. Risk, benefit, complication of LHC and possible PCI ( including but not limited to bleeding, vascular trauma requiring surgery,  infection, heart failure, stroke, MI, emergent bypass surgery, severe allergic reactions, kidney failure, dialysis and death ) were discussed with patient and willing to proceed with procedure. Will be using moderate sedation  This was discussed with the sister and also granddaughter over the phone. They strongly and adamantly want to proceed with left heart catheterization    Non-STEMI:  Patient had non-STEMI in December 2019 during hospitalization with elevated troponin. Nuclear stress test showed perfusion defect. Because of severe LV dysfunction and abnormal stress test, recommend left heart catheterization.   Patient was seen by heme-onc team and her prognosis was thought to be not reduced as lung cancer is locally advanced not metastatic. Currently on Coreg, aspirin and atorvastatin. Continue same    Hypertension:  Blood pressure is elevated today. Changes made as above mentioned. Hyperlipidemia:  Started on atorvastatin 20 mg daily. Continue same    Diabetes:  Goal hemoglobin A1c less than 7 is recommended. Defer to PCP. Last A1c 10.9    Lung cancer:  Defer to primary team and heme-onc team.    This plan was discussed with patient who is in agreement. Thank you for allowing me to participate in patient care. Please feel free to call me if you have any question or concern. Erin Perez MD  Please note: This document has been produced using voice recognition software. Unrecognized errors in transcription may be present.

## 2019-12-10 NOTE — PROGRESS NOTES
1. Have you been to the ER, urgent care clinic since your last visit? Hospitalized since your last visit? yes    2. Have you seen or consulted any other health care providers outside of the [address] [place] [address] since your last visit? Include any pap smears or colon screening.   No

## 2019-12-10 NOTE — PATIENT INSTRUCTIONS
Start Entresto 24/26mg twice per day   Start Aldactone 12.5mg daily   Lasix (Furosemide) to be taken as needed   Stop Potassium     Instructions    Patients Name:      1. You are scheduled to have a left heart cath  on December 18, 2019  at 10:00am  Please check in at 8:00am .     2. Please go to [facility] and park in the outpatient parking lot. Once you enter check in with the  there. The  will either give you directions or assist you in getting to the cath holding area. 3. You are not to eat or drink anything after midnight the morning of the procedure. 4. If you are diabetic, do not take your insulin/sugar pill the morning of the procedure. 5. MEDICATION INSTRUCTIONS:   Please take your morning medications with the following special instructions:    [x]          Please make sure to take your Blood pressure medication     [x]          Take your Aspirin and/or Plavix. Pre -procedure LABWORK is to be done within one week of your procedure date     6. We encourage families to wait in the waiting room on the first floor while the procedure is being done. The Doctor will come out and talk with you as soon as the procedure is over. 7. There is the possibility that you may spend the night in the hospital, depending on the results of the procedure. This will be determined after the procedure is done. If angioplasty or stent is planned, you will stay at least one day. 8. If you or your family have any questions, please call our office Monday -Friday, 9:00 a.m.-4:30 p.m.,  At [phone], and ask to speak to one of the nurses. 9. Be sure you have someone to drive you home, you will not be able to drive after the procedure.

## 2019-12-18 ENCOUNTER — HOSPITAL ENCOUNTER (OUTPATIENT)
Age: 64
Discharge: HOME | End: 2019-12-19
Attending: INTERNAL MEDICINE | Admitting: INTERNAL MEDICINE
Payer: MEDICARE

## 2019-12-18 ENCOUNTER — APPOINTMENT (OUTPATIENT)
Dept: NON INVASIVE DIAGNOSTICS | Age: 64
End: 2019-12-18
Attending: INTERNAL MEDICINE
Payer: MEDICARE

## 2019-12-18 DIAGNOSIS — R94.39 ABNORMAL NUCLEAR STRESS TEST: ICD-10-CM

## 2019-12-18 PROBLEM — I25.10 CAD (CORONARY ARTERY DISEASE): Status: ACTIVE | Noted: 2019-12-18

## 2019-12-18 LAB
ATRIAL RATE: 84 BPM
AV PEAK GRADIENT: 79.77 MMHG
CALCULATED P AXIS, ECG09: 54 DEGREES
CALCULATED R AXIS, ECG10: 14 DEGREES
CALCULATED T AXIS, ECG11: 48 DEGREES
DIAGNOSIS, 93000: NORMAL
ECHO AO ROOT DIAM: 2.83 CM
ECHO AV PEAK GRADIENT: 5.6 MMHG
ECHO AV PEAK VELOCITY: 118.6 CM/S
ECHO AV REGURGITANT PHT: 376.6 CM
ECHO IVC SNIFF: 2.46 CM
ECHO LA MAJOR AXIS: 3.47 CM
ECHO LA TO AORTIC ROOT RATIO: 1.23
ECHO LV EDV TEICHHOLZ: 0.8 ML
ECHO LV ESV TEICHHOLZ: 0.61 ML
ECHO LV INTERNAL DIMENSION DIASTOLIC: 5.32 CM (ref 3.9–5.3)
ECHO LV INTERNAL DIMENSION SYSTOLIC: 4.74 CM
ECHO LV IVSD: 1.08 CM (ref 0.6–0.9)
ECHO LV MASS 2D: 211.2 G (ref 67–162)
ECHO LV MASS INDEX 2D: 110.5 G/M2 (ref 43–95)
ECHO LV POSTERIOR WALL DIASTOLIC: 0.75 CM (ref 0.6–0.9)
ECHO MV REGURGITANT RADIUS PISA: 0.76 CM
ECHO TV REGURGITANT MAX VELOCITY: 298.5 CM/S
ECHO TV REGURGITANT PEAK GRADIENT: 35.6 MMHG
END DIASTOLIC PRESSURE: 20
GLUCOSE BLD STRIP.AUTO-MCNC: 179 MG/DL (ref 70–110)
GLUCOSE BLD STRIP.AUTO-MCNC: 179 MG/DL (ref 70–110)
GLUCOSE BLD STRIP.AUTO-MCNC: 265 MG/DL (ref 70–110)
INR PPP: 1 (ref 0.8–1.2)
LVFS 2D: 10.81 %
LVSV (TEICH): 16.45 ML
P-R INTERVAL, ECG05: 152 MS
PISA AR MAX VEL: 446.58 CM/S
PROTHROMBIN TIME: 13.3 SEC (ref 11.5–15.2)
Q-T INTERVAL, ECG07: 428 MS
QRS DURATION, ECG06: 76 MS
QTC CALCULATION (BEZET), ECG08: 505 MS
VENTRICULAR RATE, ECG03: 84 BPM

## 2019-12-18 PROCEDURE — 94640 AIRWAY INHALATION TREATMENT: CPT

## 2019-12-18 PROCEDURE — 74011636320 HC RX REV CODE- 636/320: Performed by: INTERNAL MEDICINE

## 2019-12-18 PROCEDURE — 99218 HC RM OBSERVATION: CPT

## 2019-12-18 PROCEDURE — 74011000250 HC RX REV CODE- 250: Performed by: INTERNAL MEDICINE

## 2019-12-18 PROCEDURE — C1725 CATH, TRANSLUMIN NON-LASER: HCPCS | Performed by: INTERNAL MEDICINE

## 2019-12-18 PROCEDURE — 77030021352 HC CBL LD SYS DISP COVD -B

## 2019-12-18 PROCEDURE — 99153 MOD SED SAME PHYS/QHP EA: CPT | Performed by: INTERNAL MEDICINE

## 2019-12-18 PROCEDURE — 93458 L HRT ARTERY/VENTRICLE ANGIO: CPT | Performed by: INTERNAL MEDICINE

## 2019-12-18 PROCEDURE — 74011250636 HC RX REV CODE- 250/636: Performed by: INTERNAL MEDICINE

## 2019-12-18 PROCEDURE — 77030013519 HC DEV INFL BASIX MRTM -B: Performed by: INTERNAL MEDICINE

## 2019-12-18 PROCEDURE — 77030013797 HC KT TRNSDUC PRSSR EDWD -A: Performed by: INTERNAL MEDICINE

## 2019-12-18 PROCEDURE — 82962 GLUCOSE BLOOD TEST: CPT

## 2019-12-18 PROCEDURE — 92928 PRQ TCAT PLMT NTRAC ST 1 LES: CPT | Performed by: INTERNAL MEDICINE

## 2019-12-18 PROCEDURE — 77030013761 HC KT HRT LFT ANGI -B: Performed by: INTERNAL MEDICINE

## 2019-12-18 PROCEDURE — 76210000002 HC OR PH I REC 3 TO 3.5 HR: Performed by: INTERNAL MEDICINE

## 2019-12-18 PROCEDURE — 77030012468 HC VLV BLEEDBK CNTRL ABBT -B: Performed by: INTERNAL MEDICINE

## 2019-12-18 PROCEDURE — 99152 MOD SED SAME PHYS/QHP 5/>YRS: CPT | Performed by: INTERNAL MEDICINE

## 2019-12-18 PROCEDURE — 77030012597: Performed by: INTERNAL MEDICINE

## 2019-12-18 PROCEDURE — 74011636637 HC RX REV CODE- 636/637: Performed by: PHYSICIAN ASSISTANT

## 2019-12-18 PROCEDURE — C1769 GUIDE WIRE: HCPCS | Performed by: INTERNAL MEDICINE

## 2019-12-18 PROCEDURE — 85347 COAGULATION TIME ACTIVATED: CPT

## 2019-12-18 PROCEDURE — C1887 CATHETER, GUIDING: HCPCS | Performed by: INTERNAL MEDICINE

## 2019-12-18 PROCEDURE — 93005 ELECTROCARDIOGRAM TRACING: CPT

## 2019-12-18 PROCEDURE — 74011250637 HC RX REV CODE- 250/637: Performed by: PHYSICIAN ASSISTANT

## 2019-12-18 PROCEDURE — 85610 PROTHROMBIN TIME: CPT

## 2019-12-18 PROCEDURE — C1894 INTRO/SHEATH, NON-LASER: HCPCS | Performed by: INTERNAL MEDICINE

## 2019-12-18 PROCEDURE — 77030015766: Performed by: INTERNAL MEDICINE

## 2019-12-18 PROCEDURE — 94761 N-INVAS EAR/PLS OXIMETRY MLT: CPT

## 2019-12-18 PROCEDURE — 93308 TTE F-UP OR LMTD: CPT

## 2019-12-18 PROCEDURE — 77030029997 HC DEV COM RDL R BND TELE -B: Performed by: INTERNAL MEDICINE

## 2019-12-18 PROCEDURE — 74011636637 HC RX REV CODE- 636/637: Performed by: INTERNAL MEDICINE

## 2019-12-18 PROCEDURE — C1874 STENT, COATED/COV W/DEL SYS: HCPCS | Performed by: INTERNAL MEDICINE

## 2019-12-18 PROCEDURE — 74011250637 HC RX REV CODE- 250/637: Performed by: INTERNAL MEDICINE

## 2019-12-18 DEVICE — XIENCE SIERRA™ EVEROLIMUS ELUTING CORONARY STENT SYSTEM 2.50 MM X 23 MM / RAPID-EXCHANGE
Type: IMPLANTABLE DEVICE | Status: FUNCTIONAL
Brand: XIENCE SIERRA™

## 2019-12-18 RX ORDER — SODIUM CHLORIDE 9 MG/ML
50 INJECTION, SOLUTION INTRAVENOUS CONTINUOUS
Status: DISPENSED | OUTPATIENT
Start: 2019-12-18 | End: 2019-12-18

## 2019-12-18 RX ORDER — SODIUM CHLORIDE 0.9 % (FLUSH) 0.9 %
5-40 SYRINGE (ML) INJECTION EVERY 8 HOURS
Status: DISCONTINUED | OUTPATIENT
Start: 2019-12-18 | End: 2019-12-18 | Stop reason: HOSPADM

## 2019-12-18 RX ORDER — SODIUM CHLORIDE 9 MG/ML
10 INJECTION INTRAMUSCULAR; INTRAVENOUS; SUBCUTANEOUS
Status: DISCONTINUED | OUTPATIENT
Start: 2019-12-18 | End: 2019-12-18 | Stop reason: CLARIF

## 2019-12-18 RX ORDER — INSULIN LISPRO 100 [IU]/ML
INJECTION, SOLUTION INTRAVENOUS; SUBCUTANEOUS
Status: DISCONTINUED | OUTPATIENT
Start: 2019-12-18 | End: 2019-12-18

## 2019-12-18 RX ORDER — GUAIFENESIN 100 MG/5ML
81 LIQUID (ML) ORAL DAILY
Status: DISCONTINUED | OUTPATIENT
Start: 2019-12-19 | End: 2019-12-19 | Stop reason: HOSPADM

## 2019-12-18 RX ORDER — ATROPINE SULFATE 0.1 MG/ML
0.5 INJECTION INTRAVENOUS AS NEEDED
Status: DISCONTINUED | OUTPATIENT
Start: 2019-12-18 | End: 2019-12-19 | Stop reason: HOSPADM

## 2019-12-18 RX ORDER — NITROGLYCERIN 0.4 MG/1
0.4 TABLET SUBLINGUAL
Status: DISPENSED | OUTPATIENT
Start: 2019-12-18 | End: 2019-12-19

## 2019-12-18 RX ORDER — SODIUM CHLORIDE 0.9 % (FLUSH) 0.9 %
5-40 SYRINGE (ML) INJECTION AS NEEDED
Status: DISCONTINUED | OUTPATIENT
Start: 2019-12-18 | End: 2019-12-18 | Stop reason: HOSPADM

## 2019-12-18 RX ORDER — GUAIFENESIN 100 MG/5ML
162 LIQUID (ML) ORAL DAILY
Status: DISCONTINUED | OUTPATIENT
Start: 2019-12-18 | End: 2019-12-18

## 2019-12-18 RX ORDER — INSULIN LISPRO 100 [IU]/ML
INJECTION, SOLUTION INTRAVENOUS; SUBCUTANEOUS
Status: DISCONTINUED | OUTPATIENT
Start: 2019-12-18 | End: 2019-12-19 | Stop reason: HOSPADM

## 2019-12-18 RX ORDER — SODIUM CHLORIDE 0.9 % (FLUSH) 0.9 %
5-40 SYRINGE (ML) INJECTION AS NEEDED
Status: DISCONTINUED | OUTPATIENT
Start: 2019-12-18 | End: 2019-12-19 | Stop reason: HOSPADM

## 2019-12-18 RX ORDER — ACETAMINOPHEN 325 MG/1
650 TABLET ORAL
Status: DISCONTINUED | OUTPATIENT
Start: 2019-12-18 | End: 2019-12-19 | Stop reason: HOSPADM

## 2019-12-18 RX ORDER — MIDAZOLAM HYDROCHLORIDE 1 MG/ML
INJECTION, SOLUTION INTRAMUSCULAR; INTRAVENOUS AS NEEDED
Status: DISCONTINUED | OUTPATIENT
Start: 2019-12-18 | End: 2019-12-18 | Stop reason: HOSPADM

## 2019-12-18 RX ORDER — MAGNESIUM SULFATE 100 %
16 CRYSTALS MISCELLANEOUS AS NEEDED
Status: DISCONTINUED | OUTPATIENT
Start: 2019-12-18 | End: 2019-12-19 | Stop reason: HOSPADM

## 2019-12-18 RX ORDER — FENTANYL CITRATE 50 UG/ML
INJECTION, SOLUTION INTRAMUSCULAR; INTRAVENOUS AS NEEDED
Status: DISCONTINUED | OUTPATIENT
Start: 2019-12-18 | End: 2019-12-18 | Stop reason: HOSPADM

## 2019-12-18 RX ORDER — ONDANSETRON 2 MG/ML
4 INJECTION INTRAMUSCULAR; INTRAVENOUS
Status: ACTIVE | OUTPATIENT
Start: 2019-12-18 | End: 2019-12-19

## 2019-12-18 RX ORDER — SODIUM CHLORIDE 0.9 % (FLUSH) 0.9 %
5-40 SYRINGE (ML) INJECTION EVERY 8 HOURS
Status: DISCONTINUED | OUTPATIENT
Start: 2019-12-18 | End: 2019-12-19 | Stop reason: HOSPADM

## 2019-12-18 RX ORDER — BUDESONIDE 0.5 MG/2ML
250 INHALANT ORAL
Status: DISCONTINUED | OUTPATIENT
Start: 2019-12-18 | End: 2019-12-19 | Stop reason: HOSPADM

## 2019-12-18 RX ORDER — CARVEDILOL 3.12 MG/1
3.12 TABLET ORAL 2 TIMES DAILY WITH MEALS
Status: DISCONTINUED | OUTPATIENT
Start: 2019-12-18 | End: 2019-12-19 | Stop reason: HOSPADM

## 2019-12-18 RX ORDER — INSULIN LISPRO 100 [IU]/ML
INJECTION, SOLUTION INTRAVENOUS; SUBCUTANEOUS ONCE
Status: COMPLETED | OUTPATIENT
Start: 2019-12-18 | End: 2019-12-18

## 2019-12-18 RX ORDER — VERAPAMIL HYDROCHLORIDE 2.5 MG/ML
INJECTION, SOLUTION INTRAVENOUS AS NEEDED
Status: DISCONTINUED | OUTPATIENT
Start: 2019-12-18 | End: 2019-12-18 | Stop reason: HOSPADM

## 2019-12-18 RX ORDER — HEPARIN SODIUM 1000 [USP'U]/ML
INJECTION, SOLUTION INTRAVENOUS; SUBCUTANEOUS AS NEEDED
Status: DISCONTINUED | OUTPATIENT
Start: 2019-12-18 | End: 2019-12-18 | Stop reason: HOSPADM

## 2019-12-18 RX ORDER — LIDOCAINE HYDROCHLORIDE 10 MG/ML
INJECTION INFILTRATION; PERINEURAL AS NEEDED
Status: DISCONTINUED | OUTPATIENT
Start: 2019-12-18 | End: 2019-12-18 | Stop reason: HOSPADM

## 2019-12-18 RX ORDER — SODIUM CHLORIDE 9 MG/ML
1000 INJECTION, SOLUTION INTRAVENOUS CONTINUOUS
Status: DISCONTINUED | OUTPATIENT
Start: 2019-12-18 | End: 2019-12-18 | Stop reason: HOSPADM

## 2019-12-18 RX ORDER — HYDRALAZINE HYDROCHLORIDE 20 MG/ML
20 INJECTION INTRAMUSCULAR; INTRAVENOUS
Status: ACTIVE | OUTPATIENT
Start: 2019-12-18 | End: 2019-12-18

## 2019-12-18 RX ORDER — ATORVASTATIN CALCIUM 20 MG/1
20 TABLET, FILM COATED ORAL
Status: DISCONTINUED | OUTPATIENT
Start: 2019-12-18 | End: 2019-12-19 | Stop reason: HOSPADM

## 2019-12-18 RX ORDER — MAGNESIUM SULFATE 100 %
4 CRYSTALS MISCELLANEOUS AS NEEDED
Status: DISCONTINUED | OUTPATIENT
Start: 2019-12-18 | End: 2019-12-18 | Stop reason: HOSPADM

## 2019-12-18 RX ORDER — HEPARIN SODIUM 200 [USP'U]/100ML
INJECTION, SOLUTION INTRAVENOUS AS NEEDED
Status: DISCONTINUED | OUTPATIENT
Start: 2019-12-18 | End: 2019-12-18 | Stop reason: HOSPADM

## 2019-12-18 RX ADMIN — INSULIN LISPRO 3 UNITS: 100 INJECTION, SOLUTION INTRAVENOUS; SUBCUTANEOUS at 11:29

## 2019-12-18 RX ADMIN — INSULIN LISPRO 6 UNITS: 100 INJECTION, SOLUTION INTRAVENOUS; SUBCUTANEOUS at 17:09

## 2019-12-18 RX ADMIN — SACUBITRIL AND VALSARTAN 1 TABLET: 24; 26 TABLET, FILM COATED ORAL at 17:08

## 2019-12-18 RX ADMIN — TICAGRELOR 90 MG: 90 TABLET ORAL at 22:27

## 2019-12-18 RX ADMIN — SODIUM CHLORIDE 50 ML/HR: 900 INJECTION, SOLUTION INTRAVENOUS at 11:14

## 2019-12-18 RX ADMIN — BUDESONIDE 250 MCG: 0.5 SUSPENSION RESPIRATORY (INHALATION) at 21:15

## 2019-12-18 RX ADMIN — CARVEDILOL 3.12 MG: 3.12 TABLET, FILM COATED ORAL at 17:08

## 2019-12-18 RX ADMIN — Medication 10 ML: at 22:00

## 2019-12-18 RX ADMIN — ATORVASTATIN CALCIUM 20 MG: 20 TABLET, FILM COATED ORAL at 21:51

## 2019-12-18 RX ADMIN — INSULIN LISPRO 2 UNITS: 100 INJECTION, SOLUTION INTRAVENOUS; SUBCUTANEOUS at 22:28

## 2019-12-18 NOTE — PERIOP NOTES
Pre-Op Summary    Pt arrived via car with family/friend and is oriented to time, place, person and situation. Patient with steady gait with none assistive devices. Visit Vitals  /82 (BP 1 Location: Left arm, BP Patient Position: At rest)   Pulse 93   Temp 98.4 °F (36.9 °C)   Resp 18   Ht 5' 7\" (1.702 m)   Wt 79.4 kg (175 lb)   SpO2 96%   BMI 27.41 kg/m²       Peripheral IV located on Left antecubital .    Patients belongings are located with family. Patient's point of contact is - Jesús Joiner, son- Milo Cm- sister and their contact number is: [phone] They will be in the waiting room. They are able to receive medication information. They will be admitted.

## 2019-12-18 NOTE — H&P
Please see clinic note from 12/10/19 as below for detail. I saw and examined patient and confirmed above. No interval change. Labs reviewed. Procedure explained to patient and all risk and benefit discussed with patient. Risk, benefit, complication of LHC and possible PCI ( including but not limited to bleeding, infection, heart failure, stroke, MI, emergent bypass surgery, kidney failure, dialysis and death ) were discussed with patient and willing to proceed with procedure. Proceed as planned. History and physical has been reviewed. There have been no significant clinical changes since the completion of the originally dated History and Physical.  Will be using moderate sedation.    ------------------------------------------------------------------------------------------------------------------    Ms. Namrata Zaidi is 69-year-old female with a history of lung cancer, cardiomyopathy, COPD, diabetes, hypertension     Patient is here today for follow-up appointment. She was recently admitted to the hospital with lower extremity edema and dyspnea. During the work-up she was found to have a non-ST elevation myocardial infarction and newly diagnosed severe LV dysfunction. Patient underwent stress test which was abnormal.  Patient was started on appropriate medical management and was asked to follow-up with me. Since she has gone home, she has been feeling okay. She denies any chest pain or chest tightness. Her shortness of breath is improving. She denies any lower extremity swelling. She denies any side effect from the medication. She denies presyncope or syncope  Denies any nausea, vomiting, abdominal pain, fever, chills, sputum production.  No hematuria or other bleeding complaints          Past Medical History:   Diagnosis Date    Cardiomyopathy [facility])       LVEF 25% (11/19)    COPD (chronic obstructive pulmonary disease) (HCC)      Diabetes mellitus ([address]      Emphysema of lung ([address]      Hypertension      Lung cancer [facility])                 Past Surgical History:   Procedure Laterality Date    ABDOMEN SURGERY PROC UNLISTED       HX APPENDECTOMY        HX TONSILLECTOMY                  Current Outpatient Medications   Medication Sig    LORazepam (ATIVAN) 0.5 mg tablet Take  by mouth.  furosemide (LASIX) 20 mg tablet Take 1 Tab by mouth daily.  aspirin 81 mg chewable tablet Take 1 Tab by mouth daily.  atorvastatin (LIPITOR) 20 mg tablet Take 1 Tab by mouth nightly.  carvedilol (COREG) 3.125 mg tablet Take 1 Tab by mouth two (2) times daily (with meals).  potassium chloride SR (KLOR-CON 10) 10 mEq tablet Take 1 Tab by mouth daily.  insulin glargine (LANTUS U-100 INSULIN) 100 unit/mL injection 20 Units by SubCUTAneous route daily. Indications: type 2 diabetes mellitus    revefenacin (YUPELRI) 175 mcg/3 mL nebu nebulizer solution 175 mcg by Nebulization route daily.  OTHER      budesonide (PULMICORT) 0.25 mg/2 mL nbsp by Nebulization route.  predniSONE (DELTASONE) 5 mg tablet Take 5 mg by mouth.  inhalational spacing device 1 Each by Does Not Apply route as needed.  insulin lispro protamin/lispro (HUMALOG MIX 75-25 KWIKPEN SC) 50 Units by SubCUTAneous route two (2) times a day.      No current facility-administered medications for this visit.          Allergies and Sensitivities:       Allergies   Allergen Reactions    Lisinopril Cough         Family History:  No family history on file.     Social History:  Social History            Tobacco Use    Smoking status: Former Smoker       Years: 24.00       Last attempt to quit: 1978       Years since quittin.4    Smokeless tobacco: Never Used   Substance Use Topics    Alcohol use: No    Drug use: No      She  reports that she quit smoking about 41 years ago. She quit after 24.00 years of use.  She has never used smokeless tobacco.  She  reports no history of alcohol use.     Review of Systems:  Cardiac symptoms as noted above in HPI. All others negative. Denies fatigue, malaise, skin rash, joint pain, blurring vision, photophobia, neck pain, hemoptysis, chronic cough, nausea, vomiting, hematuria, burning micturition, BRBPR, chronic headaches.     Physical Exam:      BP Readings from Last 3 Encounters:   12/10/19 160/85   11/29/19 128/66   05/20/19 180/88                                  Pulse Readings from Last 3 Encounters:   12/10/19 93   11/29/19 94   05/20/19 87                                              Wt Readings from Last 3 Encounters:   12/10/19 185 lb (83.9 kg)   11/29/19 194 lb (88 kg)   04/24/19 198 lb (89.8 kg)         Constitutional: Oriented to person, place, and time. HENT: Head: Normocephalic and atraumatic. Neck: No JVD present. Cardiovascular: Regular rhythm. No murmur, gallop or rubs appreciated  Lung: Breath sounds normal. No respiratory distress. No ronchi or rales appreciated  Abdominal: No tenderness. No rebound and no guarding. Musculoskeletal: There is no lower extremity edema. No cynosis     Review of Data  LABS:         Lab Results   Component Value Date/Time     Sodium 139 11/29/2019 06:13 AM     Potassium 4.3 11/29/2019 06:13 AM     Chloride 107 11/29/2019 06:13 AM     CO2 26 11/29/2019 06:13 AM     Glucose 220 (H) 11/29/2019 06:13 AM     BUN 23 (H) 11/29/2019 06:13 AM     Creatinine 1.03 11/29/2019 06:13 AM      Lipids Latest Ref Rng & Units 11/27/2019   Chol, Total <200 MG/(H)   HDL 40 - 60 MG/DL 68(H)   LDL 0 - 100 MG/. 8(H)   Trig <150 MG/   Chol/HDL Ratio 0 - 5.0   3.0   Some recent data might be hidden            Lab Results   Component Value Date/Time     ALT (SGPT) 21 11/27/2019 05:38 AM            Lab Results   Component Value Date/Time     Hemoglobin A1c 10.1 (H) 11/26/2019 04:05 PM         EKG     ECHO (12/19)  Left Ventricle Normal cavity size and wall thickness. Severe systolic dysfunction. The estimated ejection fraction is 26 - 30%.  Visually measured ejection fraction. Global hypokinesis observed. There is severe (grade 3) left ventricular diastolic dysfunction. Elevated left ventricular diastolic pressure. Wall Scoring The left ventricular wall motion is globally hypokinetic.             Left Atrium Mildly dilated left atrium. Left Atrium volume index is 40.14 mL/m2. Right Ventricle Mildly dilated right ventricle. Reduced systolic function. Right Atrium Normal cavity size. Aortic Valve Trileaflet valve structure and no stenosis. Mild aortic valve regurgitation. Mitral Valve No stenosis. Mitral annular dilatation. Moderate regurgitation. Tricuspid Valve No stenosis. Non-specific thickening. Mild tricuspid valve regurgitation. Pulmonary arterial systolic pressure is 81.9 mmHg. Mild pulmonary hypertension.      STRESS TEST (12/19)  · Baseline ECG: Normal EKG, non-specific ST-T wave abnormalities. · Gated SPECT: Left ventricular function post-stress was abnormal. Calculated ejection fraction is 30%. · Left ventricular perfusion is equivocal.  · There is small area of mildly intense reversible defect that involves distal anterior wall. This could represent mild ischemia or artifact. Clinical correlation is recommended     CATHETERIZATION     IMPRESSION & PLAN:  Ms. Umair Francisco 68-year-old female with multiple medical problem     Cardiomyopathy:  Patient has been diagnosed with severe LV dysfunction with EF 25% in December 2019. Abnormal stress test in December 2019  Currently patient is on Lasix, Coreg. Patient had excessive cough with lisinopril in the past.  Will start patient on Entresto twice daily. NYHA class II  We will discontinue Lasix and start using Aldactone 12.5 mg daily. Will stop potassium supplement. BMP will be checked  Salt and fluid restriction discussed with the patient  DW patient regarding management strategy which includes medical management vs. Ischemia evaluation (Invasive).  Risk, benefit and alternatives of each strategy discussed in detail. Risk, benefit, complication of LHC and possible PCI ( including but not limited to bleeding, vascular trauma requiring surgery,  infection, heart failure, stroke, MI, emergent bypass surgery, severe allergic reactions, kidney failure, dialysis and death ) were discussed with patient and willing to proceed with procedure. Will be using moderate sedation  This was discussed with the sister and also granddaughter over the phone. They strongly and adamantly want to proceed with left heart catheterization     Non-STEMI:  Patient had non-STEMI in December 2019 during hospitalization with elevated troponin. Nuclear stress test showed perfusion defect. Because of severe LV dysfunction and abnormal stress test, recommend left heart catheterization. Patient was seen by heme-onc team and her prognosis was thought to be not reduced as lung cancer is locally advanced not metastatic. Currently on Coreg, aspirin and atorvastatin. Continue same     Hypertension:  Blood pressure is elevated today. Changes made as above mentioned.     Hyperlipidemia:  Started on atorvastatin 20 mg daily. Continue same     Diabetes:  Goal hemoglobin A1c less than 7 is recommended. Defer to PCP.   Last A1c 10.9     Lung cancer:  Defer to primary team and heme-onc team.     This plan was discussed with patient who is in agreement

## 2019-12-18 NOTE — PROGRESS NOTES
Problem: Diabetes Self-Management  Goal: *Disease process and treatment process  Description  Define diabetes and identify own type of diabetes; list 3 options for treating diabetes. Outcome: Progressing Towards Goal  Goal: *Incorporating nutritional management into lifestyle  Description  Describe effect of type, amount and timing of food on blood glucose; list 3 methods for planning meals. Outcome: Progressing Towards Goal  Goal: *Incorporating physical activity into lifestyle  Description  State effect of exercise on blood glucose levels. Outcome: Progressing Towards Goal  Goal: *Developing strategies to promote health/change behavior  Description  Define the ABC's of diabetes; identify appropriate screenings, schedule and personal plan for screenings. Outcome: Progressing Towards Goal  Goal: *Using medications safely  Description  State effect of diabetes medications on diabetes; name diabetes medication taking, action and side effects. Outcome: Progressing Towards Goal  Goal: *Monitoring blood glucose, interpreting and using results  Description  Identify recommended blood glucose targets  and personal targets. Outcome: Progressing Towards Goal  Goal: *Prevention, detection, treatment of acute complications  Description  List symptoms of hyper- and hypoglycemia; describe how to treat low blood sugar and actions for lowering  high blood glucose level. Outcome: Progressing Towards Goal  Goal: *Prevention, detection and treatment of chronic complications  Description  Define the natural course of diabetes and describe the relationship of blood glucose levels to long term complications of diabetes.   Outcome: Progressing Towards Goal  Goal: *Developing strategies to address psychosocial issues  Description  Describe feelings about living with diabetes; identify support needed and support network  Outcome: Progressing Towards Goal  Goal: *Insulin pump training  Outcome: Progressing Towards Goal  Goal: *Sick day guidelines  Outcome: Progressing Towards Goal  Goal: *Patient Specific Goal (EDIT GOAL, INSERT TEXT)  Outcome: Progressing Towards Goal     Problem: Patient Education: Go to Patient Education Activity  Goal: Patient/Family Education  Outcome: Progressing Towards Goal     Problem: Falls - Risk of  Goal: *Absence of Falls  Description  Document [place] Fall Risk and appropriate interventions in the flowsheet.   Outcome: Progressing Towards Goal  Note: Fall Risk Interventions:  Mobility Interventions: Patient to call before getting OOB    Mentation Interventions: Door open when patient unattended    Medication Interventions: Teach patient to arise slowly, Patient to call before getting OOB    Elimination Interventions: Patient to call for help with toileting needs    History of Falls Interventions: Door open when patient unattended         Problem: Patient Education: Go to Patient Education Activity  Goal: Patient/Family Education  Outcome: Progressing Towards Goal     Problem: Chronic Obstructive Pulmonary Disease (COPD)  Goal: *Oxygen saturation during activity within specified parameters  Outcome: Progressing Towards Goal  Goal: *Able to remain out of bed as prescribed  Outcome: Progressing Towards Goal  Goal: *Absence of hypoxia  Outcome: Progressing Towards Goal  Goal: *Optimize nutritional status  Outcome: Progressing Towards Goal     Problem: Patient Education: Go to Patient Education Activity  Goal: Patient/Family Education  Outcome: Progressing Towards Goal     Problem: Hypertension  Goal: *Blood pressure within specified parameters  Outcome: Progressing Towards Goal  Goal: *Fluid volume balance  Outcome: Progressing Towards Goal  Goal: *Labs within defined limits  Outcome: Progressing Towards Goal     Problem: Patient Education: Go to Patient Education Activity  Goal: Patient/Family Education  Outcome: Progressing Towards Goal     Problem: Cath Lab Procedures: Post-Cath Day 1  Goal: Off Pathway (Use only if patient is Off Pathway)  Outcome: Progressing Towards Goal  Goal: Activity/Safety  Outcome: Progressing Towards Goal  Goal: Diagnostic Test/Procedures  Outcome: Progressing Towards Goal  Goal: Nutrition/Diet  Outcome: Progressing Towards Goal  Goal: Discharge Planning  Outcome: Progressing Towards Goal  Goal: Medications  Outcome: Progressing Towards Goal  Goal: Respiratory  Outcome: Progressing Towards Goal  Goal: Treatments/Interventions/Procedures  Outcome: Progressing Towards Goal  Goal: Psychosocial  Outcome: Progressing Towards Goal     Problem: Cath Lab Procedures: Discharge Outcomes  Goal: *Stable cardiac rhythm  Outcome: Progressing Towards Goal  Goal: *Hemodynamically stable  Outcome: Progressing Towards Goal  Goal: *Optimal pain control at patient's stated goal  Outcome: Progressing Towards Goal  Goal: *Pulses palpable, skin color within defined limits, skin temperature warm  Outcome: Progressing Towards Goal  Goal: *Lungs clear or at baseline  Outcome: Progressing Towards Goal  Goal: *Demonstrates ability to perform prescribed activity without shortness of breath or discomfort  Outcome: Progressing Towards Goal  Goal: *Verbalizes home exercise program, activity guidelines, cardiac precautions  Outcome: Progressing Towards Goal  Goal: *Verbalizes understanding and describes prescribed diet  Outcome: Progressing Towards Goal  Goal: *Verbalizes understanding and describes medication purposes and frequencies  Outcome: Progressing Towards Goal  Goal: *Identifies cardiac risk factors  Outcome: Progressing Towards Goal  Goal: *No signs and symptoms of infection or wound complications  Outcome: Progressing Towards Goal  Goal: *Anxiety reduced or absent  Outcome: Progressing Towards Goal  Goal: *Verbalizes and demonstrates incision care  Outcome: Progressing Towards Goal  Goal: *Understands and describes signs and symptoms to report to providers(Stroke Metric)  Outcome: Progressing Towards Goal  Goal: *Describes follow-up/return visits to physicians  Outcome: Progressing Towards Goal  Goal: *Describes available resources and support systems  Outcome: Progressing Towards Goal  Goal: *Influenza immunization  Outcome: Progressing Towards Goal

## 2019-12-18 NOTE — PROGRESS NOTES
1408 Telephone report received from Jennifer Ventura from PACU using SBAR and MAR.     1427 Pt received via stretcher from PACU by Jannie Romberg. Pt assisted to bed. Assessment completed. Pt has no complaints of pain. Dressing to right radial artery puncture is clean, dry, and intact. No bleeding or hematoma present. Right radial and ulnar pulses palpable. Telemetry monitor applied. Pt NSR. Pt provided stent card to place in her purse. Pt had family at the bedside who have provided pt with spaghetti at home to eat. Call bell within reach. 1730 Bedside and Verbal shift change report given to [employer]  (oncoming nurse) by Schuyler Lockhart RN   (offgoing nurse). Report included the following information SBAR and MAR.

## 2019-12-18 NOTE — PERIOP NOTES
1105 Patient arrived to PACU. Assumed care. Connected to monitor. VSS. Will continue to monitor    1410 TRANSFER - OUT REPORT:    Verbal report given to Ilsa(name) on Marisol Gant  being transferred to (unit) for routine post - op       Report consisted of patients Situation, Background, Assessment and   Recommendations(SBAR). Information from the following report(s) SBAR, Procedure Summary and Cardiac Rhythm NSR was reviewed with the receiving nurse. Lines:   Peripheral IV 12/18/19 Right Arm (Active)   Site Assessment Clean, dry, & intact 12/18/2019 11:05 AM   Phlebitis Assessment 0 12/18/2019 11:05 AM   Infiltration Assessment 0 12/18/2019 11:05 AM   Dressing Status Clean, dry, & intact 12/18/2019 11:05 AM   Dressing Type Transparent 12/18/2019 11:05 AM   Hub Color/Line Status Pink;Flushed; Infusing 12/18/2019 11:05 AM       Saline Lock 12/18/19 Left Hand (Active)   Site Assessment Clean, dry, & intact 12/18/2019  9:21 AM   Infiltration Assessment 0 12/18/2019  9:21 AM   Dressing Status Clean, dry, & intact 12/18/2019  9:21 AM   Dressing Type Transparent;Tape 12/18/2019  9:21 AM   Hub Color/Line Status Pink 12/18/2019  9:21 AM        Opportunity for questions and clarification was provided.       Patient transported with:   Registered Nurse

## 2019-12-18 NOTE — Clinical Note
Catheter inserted. Keerthi previously saw Dr Oakes, and now sees Dr Shirley. She has had a difficult time getting her refills through [facility] and does not have a computer.  I called [facility] on patient's behalf; they do have an active prescription on hand from Dr Bruner for her Eliquis, will send out.  She does not qualify for automatic refill, needs to call 2 weeks before she is out of her medication to request refill.  We reviewed her medications, refilled under Dr Shirley's name. She is next scheduled to be seen by Dr Shirley on 6/21.  She has a current Med Agreement under Dr Oakes for Alprazolam, requests refill of same.  Will forward message to Dr Shirley for Alprazolam refill to [facility].

## 2019-12-18 NOTE — Clinical Note
TRANSFER - OUT REPORT:     Verbal report given to: Lucinda Syed RN. Report consisted of patient's Situation, Background, Assessment and   Recommendations(SBAR). Opportunity for questions and clarification was provided. Patient transported with a Cardiac Cath Tech / Patient Care Tech. Patient transported to: PACU.

## 2019-12-18 NOTE — Clinical Note
Right groin and right radial clipped, prepped with ChloraPrep and draped. Wet prep solution applied at: 947. Wet prep solution dried at: 950. Wet prep elapsed drying time: 3 mins.

## 2019-12-19 VITALS
DIASTOLIC BLOOD PRESSURE: 72 MMHG | RESPIRATION RATE: 16 BRPM | WEIGHT: 175 LBS | TEMPERATURE: 97.2 F | HEIGHT: 67 IN | BODY MASS INDEX: 27.47 KG/M2 | OXYGEN SATURATION: 95 % | SYSTOLIC BLOOD PRESSURE: 127 MMHG | HEART RATE: 95 BPM

## 2019-12-19 LAB
ACT BLD: 235 SECS (ref 79–138)
ANION GAP SERPL CALC-SCNC: 5 MMOL/L (ref 3–18)
BUN SERPL-MCNC: 10 MG/DL (ref 7–18)
BUN/CREAT SERPL: 14 (ref 12–20)
CALCIUM SERPL-MCNC: 8.8 MG/DL (ref 8.5–10.1)
CHLORIDE SERPL-SCNC: 105 MMOL/L (ref 100–111)
CHOLEST SERPL-MCNC: 128 MG/DL
CO2 SERPL-SCNC: 28 MMOL/L (ref 21–32)
CREAT SERPL-MCNC: 0.69 MG/DL (ref 0.6–1.3)
ERYTHROCYTE [DISTWIDTH] IN BLOOD BY AUTOMATED COUNT: 13.1 % (ref 11.6–14.5)
GLUCOSE BLD STRIP.AUTO-MCNC: 212 MG/DL (ref 70–110)
GLUCOSE BLD STRIP.AUTO-MCNC: 252 MG/DL (ref 70–110)
GLUCOSE SERPL-MCNC: 215 MG/DL (ref 74–99)
HCT VFR BLD AUTO: 35.6 % (ref 35–45)
HDLC SERPL-MCNC: 51 MG/DL (ref 40–60)
HDLC SERPL: 2.5 {RATIO} (ref 0–5)
HGB BLD-MCNC: 11.5 G/DL (ref 12–16)
LDLC SERPL CALC-MCNC: 54.4 MG/DL (ref 0–100)
LIPID PROFILE,FLP: NORMAL
MCH RBC QN AUTO: 27.7 PG (ref 24–34)
MCHC RBC AUTO-ENTMCNC: 32.3 G/DL (ref 31–37)
MCV RBC AUTO: 85.8 FL (ref 74–97)
PLATELET # BLD AUTO: 277 K/UL (ref 135–420)
PMV BLD AUTO: 10.6 FL (ref 9.2–11.8)
POTASSIUM SERPL-SCNC: 4.1 MMOL/L (ref 3.5–5.5)
RBC # BLD AUTO: 4.15 M/UL (ref 4.2–5.3)
SODIUM SERPL-SCNC: 138 MMOL/L (ref 136–145)
TRIGL SERPL-MCNC: 113 MG/DL (ref ?–150)
VLDLC SERPL CALC-MCNC: 22.6 MG/DL
WBC # BLD AUTO: 7.9 K/UL (ref 4.6–13.2)

## 2019-12-19 PROCEDURE — 94640 AIRWAY INHALATION TREATMENT: CPT

## 2019-12-19 PROCEDURE — 94761 N-INVAS EAR/PLS OXIMETRY MLT: CPT

## 2019-12-19 PROCEDURE — 74011250637 HC RX REV CODE- 250/637: Performed by: PHYSICIAN ASSISTANT

## 2019-12-19 PROCEDURE — 36415 COLL VENOUS BLD VENIPUNCTURE: CPT

## 2019-12-19 PROCEDURE — 80061 LIPID PANEL: CPT

## 2019-12-19 PROCEDURE — 82962 GLUCOSE BLOOD TEST: CPT

## 2019-12-19 PROCEDURE — 74011000250 HC RX REV CODE- 250: Performed by: INTERNAL MEDICINE

## 2019-12-19 PROCEDURE — 74011636637 HC RX REV CODE- 636/637: Performed by: INTERNAL MEDICINE

## 2019-12-19 PROCEDURE — 85027 COMPLETE CBC AUTOMATED: CPT

## 2019-12-19 PROCEDURE — 80048 BASIC METABOLIC PNL TOTAL CA: CPT

## 2019-12-19 PROCEDURE — 74011250637 HC RX REV CODE- 250/637: Performed by: INTERNAL MEDICINE

## 2019-12-19 RX ORDER — INSULIN GLARGINE 100 [IU]/ML
10 INJECTION, SOLUTION SUBCUTANEOUS DAILY
Status: DISCONTINUED | OUTPATIENT
Start: 2019-12-19 | End: 2019-12-19 | Stop reason: HOSPADM

## 2019-12-19 RX ORDER — ATORVASTATIN CALCIUM 20 MG/1
40 TABLET, FILM COATED ORAL DAILY
Qty: 30 TAB | Refills: 0 | Status: SHIPPED | OUTPATIENT
Start: 2019-12-19

## 2019-12-19 RX ADMIN — INSULIN LISPRO 9 UNITS: 100 INJECTION, SOLUTION INTRAVENOUS; SUBCUTANEOUS at 12:49

## 2019-12-19 RX ADMIN — TICAGRELOR 90 MG: 90 TABLET ORAL at 09:46

## 2019-12-19 RX ADMIN — INSULIN LISPRO 6 UNITS: 100 INJECTION, SOLUTION INTRAVENOUS; SUBCUTANEOUS at 09:46

## 2019-12-19 RX ADMIN — CARVEDILOL 3.12 MG: 3.12 TABLET, FILM COATED ORAL at 09:46

## 2019-12-19 RX ADMIN — INSULIN GLARGINE 10 UNITS: 100 INJECTION, SOLUTION SUBCUTANEOUS at 09:53

## 2019-12-19 RX ADMIN — BUDESONIDE: 0.5 SUSPENSION RESPIRATORY (INHALATION) at 08:11

## 2019-12-19 RX ADMIN — Medication 10 ML: at 06:07

## 2019-12-19 RX ADMIN — SACUBITRIL AND VALSARTAN 1 TABLET: 24; 26 TABLET, FILM COATED ORAL at 09:46

## 2019-12-19 RX ADMIN — ASPIRIN 81 MG 81 MG: 81 TABLET ORAL at 09:46

## 2019-12-19 NOTE — ROUTINE PROCESS
1730-report received, call bell within reach, no distress noted, voiced no complaints at this time. Right hand with wrist brace intact, right wrist with dressing D/I, no bleeding or hematoma noted, good radial pulse and capillary refill. Denies any pain. 1910-Bedside and Verbal shift change report given to Alexey George (oncoming nurse) by Promise Parker (offgoing nurse). Report included the following information SBAR, MAR and Recent Results.

## 2019-12-19 NOTE — DISCHARGE INSTRUCTIONS
Cardiac Catheterization/Angiography Discharge Instructions    *Check the puncture site frequently for swelling or bleeding. If you see any bleeding, lie down and apply pressure over the area with a clean town or washcloth. Notify your doctor for any redness, swelling, drainage or oozing from the puncture site. Notify your doctor for any fever or chills. *If the arm with the puncture becomes cold, numb or painful, call Dr Sydnee Andrade at  [phone]. *Activity should be limited for the next 48 hours. Avoid strenuous activity for 48 hours. No heavy lifting, pushing, or pulling with right hand no greater than 10 pounds for three days. *Do not drive for 24 hours. *You may resume your usual diet. Drink more fluids than usual.    *Have a responsible person drive you home and stay with you for at least 24 hours after your heart catheterization/angiography. *You may remove the bandage from your Right in 24 hours. You may shower in 24 hours. No tub baths, hot tubs or swimming for one week. Do not place any lotions, creams, powders, ointments over the puncture site for one week. You may place a clean band-aid over the puncture site each day for 5 days. Change this daily. DISCHARGE SUMMARY from Nurse    PATIENT INSTRUCTIONS:    After general anesthesia or intravenous sedation, for 24 hours or while taking prescription Narcotics:  · Limit your activities  · Do not drive and operate hazardous machinery  · Do not make important personal or business decisions  · Do  not drink alcoholic beverages  · If you have not urinated within 8 hours after discharge, please contact your surgeon on call.     Report the following to your surgeon:  · Excessive pain, swelling, redness or odor of or around the surgical area  · Temperature over 100.5  · Nausea and vomiting lasting longer than 4 hours or if unable to take medications  · Any signs of decreased circulation or nerve impairment to extremity: change in color, persistent  numbness, tingling, coldness or increase pain  · Any questions    What to do at Home:  Recommended activity: Activity as tolerated and no driving for today and No heavy lifting, pushing or pulling for 3 days. If you experience any of the following symptoms pain, swelling, or bleeding at the right wrist, please follow up with Dr. Jorge Luis soria. *  Please give a list of your current medications to your Primary Care Provider. *  Please update this list whenever your medications are discontinued, doses are      changed, or new medications (including over-the-counter products) are added. *  Please carry medication information at all times in case of emergency situations. These are general instructions for a healthy lifestyle:    No smoking/ No tobacco products/ Avoid exposure to second hand smoke  Surgeon General's Warning:  Quitting smoking now greatly reduces serious risk to your health. Obesity, smoking, and sedentary lifestyle greatly increases your risk for illness    A healthy diet, regular physical exercise & weight monitoring are important for maintaining a healthy lifestyle    You may be retaining fluid if you have a history of heart failure or if you experience any of the following symptoms:  Weight gain of 3 pounds or more overnight or 5 pounds in a week, increased swelling in our hands or feet or shortness of breath while lying flat in bed. Please call your doctor as soon as you notice any of these symptoms; do not wait until your next office visit. The discharge information has been reviewed with the patient. The patient verbalized understanding. Discharge medications reviewed with the patient and appropriate educational materials and side effects teaching were provided. Patient armband removed and shredded.

## 2019-12-19 NOTE — PROGRESS NOTES
Diabetes Patient/Family Education Record  Factors That  May Influence Patients Ability  to Learn or  Comply with Recommendations   []   Language barrier    []   Cultural needs   [x]   Motivation    []   Cognitive limitation    []   Physical   []   Education    []   Physiological factors   []   Hearing/vision/speaking impairment   []   Sikhism beliefs    [x]   Financial factors   []  Other:   []  No factors identified at this time. Person Instructed:   [x]   Patient   [x]   Family x2    []  Other     Preference for Learning:   [x]   Verbal   []   Written   []  Demonstration     Level of Comprehension & Competence:    [x]  Good                                      [] Fair                                     []  Poor                             [x]  Needs Reinforcement   [x]  Teachback completed    Education Component: Advised pt to follow up with her PCP with A1c 10.1%.    [x]  Medication management,  and potential medication interactions    [x]  Nutritional management -obtain usual meal pattern   []  Exercise   [x]  Signs, symptoms, and treatment of hyperglycemia and hypoglycemia   [x] Prevention, recognition and treatment of hyperglycemia and hypoglycemia   [x]  Importance of blood glucose monitoring and how to obtain a blood glucose meter    []  Instruction on use of the blood glucose meter   [x]  Discuss the importance of HbA1C monitoring    []  Sick day guidelines   []  Proper use and disposal of lancets, needles, syringes or insulin pens (if appropriate)   [x]  Potential long-term complications (retinopathy, kidney disease, neuropathy, foot care)   [] Information about whom to contact in case of emergency or for more information    [x]  Goal:  Patient/family will demonstrate understanding of Diabetes Self Management Skills by: (date) _12/29/19______  Plan for post-discharge education or self-management support:    [x] Outpatient class schedule provided            [] Patient Declined    [] Scheduled for outpatient classes (date) _______  Verify:  Does patient understand how diabetes medications work? _yes___________________________  Does patient know what their most recent A1c is? ___reviwed  10.1%________________________________  Does patient monitor glucose at home? _____yes has CGM ______________________________________  Does patient have difficulty obtaining diabetes medications or testing supplies? ___pens yes- has worked aout a reduction in cost______________     Pt was very enthusiastic re her diabetes care.  Kyree DUNHAM

## 2019-12-19 NOTE — ROUTINE PROCESS
0810-Assessment completed, call bell within reach, no distress noted, voiced  No complaints at this time.

## 2019-12-19 NOTE — PROGRESS NOTES
Respiratory Therapy Assessment Care Plan    Patient:  Marta Ochoa 75 y.o. female 12/19/2019 8:16 AM    Abnormal nuclear stress test [R94.39]  CAD (coronary artery disease) [I25.10]      Chest X-RAY:   Results from Hospital Encounter encounter on 04/24/19   XR CHEST PORT    Impression IMPRESSION:    No significant interval change given differences in technique. Continued  ill-defined right hilar and lower lobe airspace process, which is nonspecific  but given history may represent sequela of prior right lung cancer with  radiation therapy. Superimposed pneumonia is possible, although no definite  change is seen. Results from [place] encounter on 12/30/18   XR CHEST PA LAT    Impression IMPRESSION:    1. Shallow inspiration with interval increase of right lower lobe airspace  process. Follow-up is recommended. Patient has known history of neoplasm with  possible radiation treatment. Results from Hospital Encounter encounter on 10/31/18   XR CHEST PA LAT    Impression IMPRESSION:    Patchy alveolar infiltrates in the mid to inferior right lung. Vital Signs:   Visit Vitals  /79 (BP 1 Location: Right arm, BP Patient Position: At rest)   Pulse 86   Temp 97.9 °F (36.6 °C)   Resp 16   Ht 5' 7\" (1.702 m)   Wt 79.4 kg (175 lb)   SpO2 95%   BMI 27.41 kg/m²         Indications for treatment: History of COPD. Plan of care: Pulmicort Q12H. Continue to follow home regimen and provide COPD maintenance. Goal: Maintain on RA. Keep oxygen saturations greater than 92%.

## 2019-12-19 NOTE — DISCHARGE SUMMARY
[facility]  -  Progress Note      Discharge Summary     Patient: Isra Heller MRN: [id number]  SSN: [id number]    YOB: 1955  Age: 75 y.o. Sex: female       Admit Date: 12/18/2019    Discharge Date: 12/19/2019      Admission Diagnoses: Abnormal nuclear stress test [R94.39]  CAD (coronary artery disease) [I25.10]  Patient seen and examined independently. Uneventful evening post stenting. No chest pain. Cath site clean and dry without ecchymosis. Instructions given in detail. Agree with assessment plan for discharge today. Alisson Rincon MD  Discharge Diagnoses:   Problem List as of 12/19/2019 Date Reviewed: 11/26/2019          Codes Class Noted - Resolved    CAD (coronary artery disease) ICD-10-CM: I25.10  ICD-9-CM: 414.00  12/18/2019 - Present        Pneumonitis ICD-10-CM: J18.9  ICD-9-CM: 486  11/26/2019 - Present    Overview Signed 11/26/2019  7:28 PM by Kailash Nogueira, DO     Post-Obstructive Pneumonia vs. Radiation Pneumonitis             Arterial occlusion ICD-10-CM: I70.90  ICD-9-CM: 444.9  11/26/2019 - Present    Overview Signed 11/26/2019  7:29 PM by Kailash Nogueira, DO     Right Lower Lobe Artery Occlusion and Right Inferior Pulmonary Vein Occlusion possibly due to compression by Lung Mass.              NSTEMI (non-ST elevated myocardial infarction) [facility]) ICD-10-CM: I21.4  ICD-9-CM: 410.70  11/26/2019 - Present        COPD (chronic obstructive pulmonary disease) ([facility] ICD-10-CM: J44.9  ICD-9-CM: 496  11/26/2019 - Present        Diabetes mellitus, type 2 ([facility] ICD-10-CM: E11.9  ICD-9-CM: 250.00  11/26/2019 - Present        HTN (hypertension) ICD-10-CM: I10  ICD-9-CM: 401.9  11/26/2019 - Present        Occlusion of artery ICD-10-CM: I70.90  ICD-9-CM: 444.9  11/26/2019 - Present        Occlusion of vein ICD-10-CM: I82.90  ICD-9-CM: 453.9  11/26/2019 - Present               Discharge Condition: Good    Hospital Course: Ms. Esther Chisholm presented for elective cardiac cath 12/18/19 and is s/p cardiac cath with MAHAD placement to LAD. She has no complaints the morning of discharge, right radial cath site without hematoma. She has been instructed as to the importance of Brilinta and ASA 81 mg without missing doses. She is discharged on ASA, Brilinta (has been filled at pharmacy), Coreg, Atorvastatin and resuming Entresto, in addition to her other home medications. She will follow up in the office in one week. Atorvastatin has been increased to 40 mg daily. Consults: Cardiology    Significant Diagnostic Studies: angiography:   Diagnostic   Dominance: Right   Left Main   The vessel is large. The vessel is angiographically normal.   Left Anterior Descending   Mid 20-30% narrowing. Eccentric 70% tubular stenosis at D2 followed by eccentric 90% another tubular lesion at D3. Diagonal branches are small caliber vessels Status post PCI and stenting of mid LAD stenosis using 2.5 x 23 mm resolute MAHAD   Mid LAD lesion 85% stenosed. . Lesion is the culprit lesion. The lesion is serial. There is moderate plaque burden detected. Ramus Intermedius   Mid 50% tubular narrowing. Far distal 90-95% tubular stenosis. Left Circumflex   The vessel exhibits minimal luminal irregularities. OM: Angiographically normal. Inferior branch has 30-40% tubular lesion   Right Coronary Artery   Mid vessel 30-40% luminal irregularities. Posterolateral branch small caliber vessel with far distal long sequential 90-95% lesions (not suitable for PCI) RPDA without any obstructive disease   Intervention     Mid LAD lesion   Angioplasty   Angioplasty using a standard balloon was performed prior to stent deployment. The balloon used was a CATH BLLN RX TREK [id number] -- . Balloon inflated using single inflation technique. Stent   A single stent was placed. Drug-eluting stent was successfully placed. The stent used was a STENT SYS COR [id number] -- XIENCE MELISSA.    Angioplasty   Angioplasty using a standard balloon was performed following stent deployment. The balloon used was a CATH BLLN COR DIL [id number] -- NC TREK RAPID-EXCHANGE. Balloon inflated using single inflation technique. Post-Intervention Lesion Assessment   The intervention was successful. The guidewire crossed the lesion. The pre-interventional distal flow is normal (LETTY 3). Post-intervention LETTY flow is 3. The patient experienced the no-reflow phenomenon in this vessel. There is a 0% residual stenosis post intervention. Disposition: home    Discharge Medications:   Current Discharge Medication List      START taking these medications    Details   ticagrelor (BRILINTA) 90 mg tablet Take 1 Tab by mouth two (2) times a day. Qty: 60 Tab, Refills: 12         CONTINUE these medications which have CHANGED    Details   atorvastatin (LIPITOR) 20 mg tablet Take 2 Tabs by mouth daily. Qty: 30 Tab, Refills: 0         CONTINUE these medications which have NOT CHANGED    Details   sacubitril-valsartan (ENTRESTO) 24 mg/26 mg tablet Take 1 Tab by mouth two (2) times a day. Qty: 60 Tab, Refills: 6      aspirin 81 mg chewable tablet Take 1 Tab by mouth daily. Qty: 30 Tab, Refills: 0      carvedilol (COREG) 3.125 mg tablet Take 1 Tab by mouth two (2) times daily (with meals). Qty: 60 Tab, Refills: 0      insulin glargine (LANTUS U-100 INSULIN) 100 unit/mL injection 20 Units by SubCUTAneous route daily. Indications: type 2 diabetes mellitus      revefenacin (YUPELRI) 175 mcg/3 mL nebu nebulizer solution 175 mcg by Nebulization route daily. budesonide (PULMICORT) 0.25 mg/2 mL nbsp by Nebulization route. predniSONE (DELTASONE) 5 mg tablet Take 5 mg by mouth. insulin lispro protamin/lispro (HUMALOG MIX 75-25 KWIKPEN SC) 50 Units by SubCUTAneous route two (2) times a day. OTHER       inhalational spacing device 1 Each by Does Not Apply route as needed.   Qty: 1 Device, Refills: 0             Activity: Activity as tolerated  Diet: Cardiac Diet  Wound Care: Keep wound clean and dry    Follow-up Appointments   Procedures    FOLLOW UP VISIT Appointment in: One Week     Standing Status:   Standing     Number of Occurrences:   1     Order Specific Question:   Appointment in     Answer: One Week       Signed By: Sabra Hodges     December 19, 2019

## 2019-12-19 NOTE — PROGRESS NOTES
conducted an initial consultation and Spiritual Assessment for Consuelo Kilpatrick, who is a 75 y.o.,female. Patients Primary Language is: [place]. According to the patients EMR Buddhist Affiliation is: Non Lutheran.     The reason the Patient came to the hospital is:   Patient Active Problem List    Diagnosis Date Noted    CAD (coronary artery disease) 12/18/2019    Pneumonitis 11/26/2019    Arterial occlusion 11/26/2019    NSTEMI (non-ST elevated myocardial infarction) ([facility] 11/26/2019    COPD (chronic obstructive pulmonary disease) ([facility] 11/26/2019    Diabetes mellitus, type 2 ([facility] 11/26/2019    HTN (hypertension) 11/26/2019    Occlusion of artery 11/26/2019    Occlusion of vein 11/26/2019        The  provided the following Interventions:  Initiated a relationship of care and support. Explored issues of mariana, spirituality and/or Sikhism needs while hospitalized. Listened empathically. Provided chaplaincy education. Provided information about Spiritual Care Services. Offered prayer and assurance of continued prayers on patient's behalf. Chart reviewed. The following outcomes were achieved:  Patient shared some information about their medical narrative and spiritual journey/beliefs. Patient processed feeling about current hospitalization. Patient expressed gratitude for the 's visit. Assessment:  Patient did not indicate any spiritual or Sikhism issues which require Spiritual Care Services interventions at this time. Patient does not have any Sikhism/cultural needs that will affect patients preferences in health care. Plan:  Chaplains will continue to follow and will provide pastoral care on an as needed or requested basis.  recommends bedside caregivers page  on duty if patient shows signs of acute spiritual or emotional distress.     [facility]   Staff [facility]   [phone]

## 2019-12-19 NOTE — PROGRESS NOTES
Discharge instructions provided to pt and pt son on behalf of primary nurse [facility]. Prescriptions for brilinta and lipitor will be delivered to pt room from the [place] [id number].. Recommended follow up appointments reviewed. Peripheral iv and telemetry monitor removed.

## 2019-12-19 NOTE — PROGRESS NOTES
Assume care of patient lying in bed watching TV. Alert and oriented X 4. Denies pain or discomfort at present. Bed locked in lowest position. Call light within reach and understand to use for assistance and needs. 2040 Patient requesting neb treatment that she takes at home. States\" she would not be able to sleep tonight without\" Ms. Cyn Shabazz notified and reorder patient maintance  medication. 12/19/2019    0200 Patient sleeping without complaints voiced. Cath site entry without redness, swelling or pain. 3226 Bedside and Verbal shift change report given to MONICA Horton (oncoming nurse) by Cuauhtemoc Bolaños RN (offgoing nurse). Report given with SBAR, Kardex, Intake/Output, MAR and Recent Results.

## 2019-12-19 NOTE — PROGRESS NOTES
Problem: Discharge Planning  Goal: *Discharge to safe environment  Outcome: Resolved/Met     Home       Reason for Admission:   Abnormal stress               RRAT Score:  21                Resources/supports as identified by patient/family:  Family, ins. PCP                 Top Challenges facing patient (as identified by patient/family and CM): Finances/Medication cost?   [facility] ins. Transportation?  family              Support system or lack thereof?  family                      Living arrangements? spouse           Self-care/ADLs/Cognition? Independent          Current Advanced Directive/Advance Care Plan:  None, does not want to complete one @ this time. Plan for utilizing home health:   Not indicated                  Transition of Care Plan:  Home with spouse & out-pt follow up. Chart reviewed. Met with pt., verified all demographics. States has [facility] ins. States Dr. Amena Brito is her PCP, last seen about 3 weeks ago. NOK:  Jeri Kraft, spouse, with whom she lives with & he will transport her home @ discharge. Uses no DME. Independent with ADL's prior to admit. Available as needed. [facility], ext [phone]. Patient has designated her spouse to participate in his/her discharge plan and to receive any needed information. Name:  Jeri Kraft  Address:  Phone number:  [phone]    Care Management Interventions  PCP Verified by CM: Yes(Dr. Amena Brito, last seen about 3 weeks. )  Palliative Care Criteria Met (RRAT>21 & CHF Dx)?: Yes  Palliative Consult Recommended?: Yes  Mode of Transport at Discharge:  Other (see comment)(Family)  Transition of Care Consult (CM Consult): Discharge Planning  Discharge Durable Medical Equipment: No  Physical Therapy Consult: No  Occupational Therapy Consult: No  Speech Therapy Consult: No  Current Support Network: Lives with Spouse  Confirm Follow Up Transport: Family  Discharge Location  Discharge Placement: Home

## 2019-12-19 NOTE — ROUTINE PROCESS
0810-assessment completed, call bell within reach, no distress noted, voiced no complaints at this time. 0946-am due medications given. 1230-no change in condition, no distress noted, pm due medications given. 1445-discharge instructions given by Luz Thomas RN. Discharged home via wheel chair.

## 2020-01-17 NOTE — TELEPHONE ENCOUNTER
Requested Prescriptions     Pending Prescriptions Disp Refills    sacubitril-valsartan (ENTRESTO) 24 mg/26 mg tablet 60 Tab 6     Sig: Take 1 Tab by mouth two (2) times a day.

## 2020-01-27 ENCOUNTER — OFFICE VISIT (OUTPATIENT)
Dept: CARDIOLOGY CLINIC | Age: 65
End: 2020-01-27

## 2020-01-27 VITALS
SYSTOLIC BLOOD PRESSURE: 147 MMHG | DIASTOLIC BLOOD PRESSURE: 78 MMHG | OXYGEN SATURATION: 98 % | HEART RATE: 97 BPM | BODY MASS INDEX: 29.6 KG/M2 | WEIGHT: 189 LBS

## 2020-01-27 DIAGNOSIS — I42.9 CARDIOMYOPATHY, UNSPECIFIED TYPE (HCC): Primary | ICD-10-CM

## 2020-01-27 RX ORDER — CARVEDILOL 6.25 MG/1
6.25 TABLET ORAL 2 TIMES DAILY WITH MEALS
Qty: 60 TAB | Refills: 6 | Status: SHIPPED | OUTPATIENT
Start: 2020-01-27 | End: 2020-04-17 | Stop reason: SDUPTHER

## 2020-01-27 RX ORDER — ACETAMINOPHEN 500 MG
TABLET ORAL
Qty: 1 KIT | Refills: 0 | Status: SHIPPED | OUTPATIENT
Start: 2020-01-27 | End: 2022-08-01

## 2020-01-27 RX ORDER — SPIRONOLACTONE 25 MG/1
12.5 TABLET ORAL DAILY
COMMUNITY
End: 2020-02-10 | Stop reason: SDUPTHER

## 2020-01-27 NOTE — PATIENT INSTRUCTIONS
Increase Coreg to 6.25mg twice per day       Please call central scheduling at [phone] -echo in 3 months     All testing is completed at [address] [place] [address]

## 2020-01-27 NOTE — PROGRESS NOTES
[facility]    Ms. Rick Knight is 75 y.o. female with a history of lung cancer, cardiomyopathy, COPD, diabetes, hypertension    Patient is here today for follow-up appointment. She is feeling much better since last time. She does not report any symptoms to suggest angina. She denies any heart failure symptoms. She is taking her medication regularly without any side effect. She denies palpitation, presyncope or syncope. Overall she is very happy with overall management so far  Denies any nausea, vomiting, abdominal pain, fever, chills, sputum production. No hematuria or other bleeding complaints    Past Medical History:   Diagnosis Date    CAD (coronary artery disease)     S/P Mid LAD 2.5 X 23 mm XIENCE MAHAD (12/19)    Cardiomyopathy ([facility]     LVEF 25% (11/19)    COPD (chronic obstructive pulmonary disease) ([facility]     Diabetes mellitus (HCC)     Emphysema of lung (HCC)     Hypertension     [facility]        Past Surgical History:   Procedure Laterality Date    ABDOMEN SURGERY PROC UNLISTED  1972    HX APPENDECTOMY      HX TONSILLECTOMY         Current Outpatient Medications   Medication Sig    spironolactone (ALDACTONE) 25 mg tablet Take 12.5 mg by mouth daily.  sacubitril-valsartan (ENTRESTO) 24 mg/26 mg tablet Take 1 Tab by mouth two (2) times a day.  ticagrelor (BRILINTA) 90 mg tablet Take 1 Tab by mouth two (2) times a day.  atorvastatin (LIPITOR) 20 mg tablet Take 2 Tabs by mouth daily.  aspirin 81 mg chewable tablet Take 1 Tab by mouth daily.  carvedilol (COREG) 3.125 mg tablet Take 1 Tab by mouth two (2) times daily (with meals).  insulin glargine (LANTUS U-100 INSULIN) 100 unit/mL injection 20 Units by SubCUTAneous route daily. Indications: type 2 diabetes mellitus    OTHER     budesonide (PULMICORT) 0.25 mg/2 mL nbsp by Nebulization route.  predniSONE (DELTASONE) 5 mg tablet Take 5 mg by mouth.  inhalational spacing device 1 Each by Does Not Apply route as needed.  insulin lispro protamin/lispro (HUMALOG MIX 75-25 KWIKPEN SC) by SubCUTAneous route two (2) times a day. Pt reported sliding scale    revefenacin (YUPELRI) 175 mcg/3 mL nebu nebulizer solution 175 mcg by Nebulization route daily. No current facility-administered medications for this visit. Allergies and Sensitivities:  Allergies   Allergen Reactions    Lisinopril Cough       Family History:  No family history on file. Social History:  Social History     Tobacco Use    Smoking status: Former Smoker     Years:      Last attempt to quit: 1978     Years since quittin.5    Smokeless tobacco: Never Used   Substance Use Topics    Alcohol use: No    Drug use: No     She  reports that she quit smoking about 41 years ago. She quit after 24.00 years of use. She has never used smokeless tobacco.  She  reports no history of alcohol use. Review of Systems:  Cardiac symptoms as noted above in HPI. All others negative. Denies fatigue, malaise, skin rash, joint pain, blurring vision, photophobia, neck pain, hemoptysis, chronic cough, nausea, vomiting, hematuria, burning micturition, BRBPR, chronic headaches. Physical Exam:  BP Readings from Last 3 Encounters:   20 147/78   19 127/72   12/10/19 160/85         Pulse Readings from Last 3 Encounters:   20 97   19 95   12/10/19 93          Wt Readings from Last 3 Encounters:   20 189 lb (85.7 kg)   19 175 lb (79.4 kg)   12/10/19 185 lb (83.9 kg)       Constitutional: Oriented to person, place, and time. HENT: Head: Normocephalic and atraumatic. Neck: No JVD present. Cardiovascular: Regular rhythm. No murmur, gallop or rubs appreciated  Lung: Breath sounds normal. No respiratory distress. No ronchi or rales appreciated  Abdominal: No tenderness. No rebound and no guarding. Musculoskeletal: There is no lower extremity edema.  No cynosis    Review of Data  LABS:   Lab Results   Component Value Date/Time    Sodium 138 12/19/2019 07:27 AM    Potassium 4.1 12/19/2019 07:27 AM    Chloride 105 12/19/2019 07:27 AM    CO2 28 12/19/2019 07:27 AM    Glucose 215 (H) 12/19/2019 07:27 AM    BUN 10 12/19/2019 07:27 AM    Creatinine 0.69 12/19/2019 07:27 AM     Lipids Latest Ref Rng & Units 12/19/2019 11/27/2019   Chol, Total <200 MG/ 206(H)   HDL 40 - 60 MG/DL 51 68(H)   LDL 0 - 100 MG/DL 54.4 109. 8(H)   Trig <150 MG/ 141   Chol/HDL Ratio 0 - 5.0   2.5 3.0   Some recent data might be hidden     Lab Results   Component Value Date/Time    ALT (SGPT) 21 11/27/2019 05:38 AM     Lab Results   Component Value Date/Time    Hemoglobin A1c 10.1 (H) 11/26/2019 04:05 PM       EKG    ECHO (12/19)  Left Ventricle Normal cavity size and wall thickness. Severe systolic dysfunction. The estimated ejection fraction is 26 - 30%. Visually measured ejection fraction. Global hypokinesis observed. There is severe (grade 3) left ventricular diastolic dysfunction. Elevated left ventricular diastolic pressure. Wall Scoring The left ventricular wall motion is globally hypokinetic. Left Atrium Mildly dilated left atrium. Left Atrium volume index is 40.14 mL/m2. Right Ventricle Mildly dilated right ventricle. Reduced systolic function. Right Atrium Normal cavity size. Aortic Valve Trileaflet valve structure and no stenosis. Mild aortic valve regurgitation. Mitral Valve No stenosis. Mitral annular dilatation. Moderate regurgitation. Tricuspid Valve No stenosis. Non-specific thickening. Mild tricuspid valve regurgitation. Pulmonary arterial systolic pressure is 35.3 mmHg. Mild pulmonary hypertension. STRESS TEST (12/19)  · Baseline ECG: Normal EKG, non-specific ST-T wave abnormalities. · Gated SPECT: Left ventricular function post-stress was abnormal. Calculated ejection fraction is 30%.   · Left ventricular perfusion is equivocal.  · There is small area of mildly intense reversible defect that involves distal anterior wall. This could represent mild ischemia or artifact. Clinical correlation is recommended    CATHETERIZATION (12/19)  Left Main   The vessel is large. The vessel is angiographically normal.   Left Anterior Descending   Mid 20-30% narrowing. Eccentric 70% tubular stenosis at D2 followed by eccentric 90% another tubular lesion at D3. Diagonal branches are small caliber vessels Status post PCI and stenting of mid LAD stenosis using 2.5 x 23 mm resolute MAHAD   Mid LAD lesion 85% stenosed. . Lesion is the culprit lesion. The lesion is serial. There is moderate plaque burden detected. Ramus Intermedius   Mid 50% tubular narrowing. Far distal 90-95% tubular stenosis. Left Circumflex   The vessel exhibits minimal luminal irregularities. OM: Angiographically normal. Inferior branch has 30-40% tubular lesion   Right Coronary Artery   Mid vessel 30-40% luminal irregularities. Posterolateral branch small caliber vessel with far distal long sequential 90-95% lesions (not suitable for PCI) RPDA without any obstructive disease     IMPRESSION & PLAN:  Ms. Pat Mcqueen 13-year-old female with multiple medical problem    Cardiomyopathy:  Patient has been diagnosed with severe LV dysfunction with EF 25% in December 2019. Currently on Entresto, Brilinta, Coreg, Aldactone. No evidence of fluid overload on exam.  NYHA class II  We will repeat echo to check on EF in about 3 months. If low, can consider AICD pending on status on lung cancer    CAD  Patient had non-STEMI in December 2019 during hospitalization with elevated troponin. Nuclear stress test showed perfusion defect. Cardiac cath showed CAD and she had LAD stent in December 2019  Continue aspirin lifelong. Emerson Manna will be continued for a year. Continue beta-blocker and statin    Hypertension:  Blood pressure is likely elevated. Will increase Coreg dose to 6.25 mg twice daily.   Continue rest of the medication    Hyperlipidemia:  Started on atorvastatin 20 mg daily. Continue same. Last LDL 54    Diabetes:  Goal hemoglobin A1c less than 7 is recommended. Defer to PCP. Last A1c 10.1    Lung cancer:  Defer to primary team and heme-onc team.    This plan was discussed with patient who is in agreement. Thank you for allowing me to participate in patient care. Please feel free to call me if you have any question or concern. Demian Zamora MD  Please note: This document has been produced using voice recognition software. Unrecognized errors in transcription may be present.

## 2020-02-05 NOTE — TELEPHONE ENCOUNTER
Patient called and is no longer able to afford Entresto and Brilinta. Patient requests alternative medication be prescribed or if clinic is able to provide copay assistance card.

## 2020-02-06 RX ORDER — CLOPIDOGREL BISULFATE 75 MG/1
75 TABLET ORAL DAILY
Qty: 30 TAB | Refills: 5 | Status: SHIPPED | OUTPATIENT
Start: 2020-02-06 | End: 2020-04-21 | Stop reason: SDUPTHER

## 2020-02-06 RX ORDER — VALSARTAN 40 MG/1
40 TABLET ORAL DAILY
Qty: 30 TAB | Refills: 5 | Status: SHIPPED | OUTPATIENT
Start: 2020-02-06 | End: 2020-03-05 | Stop reason: SDUPTHER

## 2020-02-06 NOTE — TELEPHONE ENCOUNTER
PCP: Russ Hernandez MD    Last appt: 1/27/2020  Future Appointments   Date Time Provider Kateryna Melvin   4/16/2020 10:30 AM [facility] ECHO RM 1 [id number]   4/23/2020  9:15 AM Alfreda Osuna MD [address] [place] [address]       Requested Prescriptions     Pending Prescriptions Disp Refills    valsartan (DIOVAN) 40 mg tablet 30 Tab 5     Sig: Take 1 Tab by mouth daily.  clopidogreL (PLAVIX) 75 mg tab 30 Tab 5     Sig: Take 1 Tab by mouth daily. Other Comments:  Switch from Brilinta and Entrestro per pt request. Had financial limitations.

## 2020-02-06 NOTE — TELEPHONE ENCOUNTER
Contacted pt at [facility] number. Two patient Identifiers confirmed. Advised pt per Dr Chu Serrano. Pt verbalized understanding.

## 2020-02-10 RX ORDER — SPIRONOLACTONE 25 MG/1
12.5 TABLET ORAL DAILY
Qty: 30 TAB | Refills: 6 | Status: SHIPPED | OUTPATIENT
Start: 2020-02-10 | End: 2020-04-17 | Stop reason: SDUPTHER

## 2020-02-10 NOTE — TELEPHONE ENCOUNTER
Requested Prescriptions     Pending Prescriptions Disp Refills    spironolactone (ALDACTONE) 25 mg tablet       Sig: Take 0.5 Tabs by mouth daily.

## 2020-02-24 ENCOUNTER — HOSPITAL ENCOUNTER (OUTPATIENT)
Dept: CT IMAGING | Age: 65
Discharge: HOME | End: 2020-02-24
Attending: INTERNAL MEDICINE
Payer: MEDICARE

## 2020-02-24 DIAGNOSIS — C34.11 MALIGNANT NEOPLASM OF UPPER LOBE OF RIGHT LUNG (HCC): ICD-10-CM

## 2020-02-24 LAB — CREAT UR-MCNC: 0.7 MG/DL (ref 0.6–1.3)

## 2020-02-24 PROCEDURE — 71260 CT THORAX DX C+: CPT

## 2020-02-24 PROCEDURE — 74011636320 HC RX REV CODE- 636/320: Performed by: INTERNAL MEDICINE

## 2020-02-24 PROCEDURE — 82565 ASSAY OF CREATININE: CPT

## 2020-02-24 RX ADMIN — IOPAMIDOL 80 ML: 612 INJECTION, SOLUTION INTRAVENOUS at 09:55

## 2020-03-06 RX ORDER — VALSARTAN 40 MG/1
40 TABLET ORAL DAILY
Qty: 30 TAB | Refills: 6 | Status: SHIPPED | OUTPATIENT
Start: 2020-03-06 | End: 2020-04-17 | Stop reason: SDUPTHER

## 2020-03-09 ENCOUNTER — APPOINTMENT (OUTPATIENT)
Dept: GENERAL RADIOLOGY | Age: 65
DRG: 293 | End: 2020-03-09
Attending: EMERGENCY MEDICINE
Payer: MEDICARE

## 2020-03-09 ENCOUNTER — TELEPHONE (OUTPATIENT)
Dept: CARDIOLOGY CLINIC | Age: 65
End: 2020-03-09

## 2020-03-09 ENCOUNTER — APPOINTMENT (OUTPATIENT)
Dept: CT IMAGING | Age: 65
DRG: 293 | End: 2020-03-09
Attending: EMERGENCY MEDICINE
Payer: MEDICARE

## 2020-03-09 ENCOUNTER — HOSPITAL ENCOUNTER (INPATIENT)
Age: 65
LOS: 2 days | Discharge: HOME | DRG: 293 | End: 2020-03-11
Attending: EMERGENCY MEDICINE | Admitting: HOSPITALIST
Payer: MEDICARE

## 2020-03-09 DIAGNOSIS — R09.02 HYPOXIA: ICD-10-CM

## 2020-03-09 DIAGNOSIS — R06.03 RESPIRATORY DISTRESS: Primary | ICD-10-CM

## 2020-03-09 PROBLEM — E78.5 HYPERLIPIDEMIA: Status: ACTIVE | Noted: 2020-03-09

## 2020-03-09 PROBLEM — I50.9 CHF (CONGESTIVE HEART FAILURE) (HCC): Status: ACTIVE | Noted: 2020-03-09

## 2020-03-09 PROBLEM — C34.90 LUNG CANCER (HCC): Status: ACTIVE | Noted: 2020-03-09

## 2020-03-09 PROBLEM — C14.0 THROAT CANCER (HCC): Status: ACTIVE | Noted: 2020-03-09

## 2020-03-09 LAB
ALBUMIN SERPL-MCNC: 2.9 G/DL (ref 3.4–5)
ALBUMIN/GLOB SERPL: 0.7 {RATIO} (ref 0.8–1.7)
ALP SERPL-CCNC: 147 U/L (ref 45–117)
ALT SERPL-CCNC: 14 U/L (ref 13–56)
ANION GAP SERPL CALC-SCNC: 4 MMOL/L (ref 3–18)
APTT PPP: 28.5 SEC (ref 23–36.4)
AST SERPL-CCNC: 15 U/L (ref 10–38)
BASOPHILS # BLD: 0 K/UL (ref 0–0.1)
BASOPHILS NFR BLD: 0 % (ref 0–2)
BILIRUB SERPL-MCNC: 0.4 MG/DL (ref 0.2–1)
BNP SERPL-MCNC: 3752 PG/ML (ref 0–900)
BUN SERPL-MCNC: 14 MG/DL (ref 7–18)
BUN/CREAT SERPL: 22 (ref 12–20)
CALCIUM SERPL-MCNC: 8.9 MG/DL (ref 8.5–10.1)
CHLORIDE SERPL-SCNC: 108 MMOL/L (ref 100–111)
CK MB CFR SERPL CALC: 2.6 % (ref 0–4)
CK MB CFR SERPL CALC: 3.1 % (ref 0–4)
CK MB SERPL-MCNC: 1.8 NG/ML (ref 5–25)
CK MB SERPL-MCNC: 1.9 NG/ML (ref 5–25)
CK SERPL-CCNC: 61 U/L (ref 26–192)
CK SERPL-CCNC: 68 U/L (ref 26–192)
CO2 SERPL-SCNC: 27 MMOL/L (ref 21–32)
CREAT SERPL-MCNC: 0.64 MG/DL (ref 0.6–1.3)
DIFFERENTIAL METHOD BLD: ABNORMAL
EOSINOPHIL # BLD: 0.3 K/UL (ref 0–0.4)
EOSINOPHIL NFR BLD: 4 % (ref 0–5)
ERYTHROCYTE [DISTWIDTH] IN BLOOD BY AUTOMATED COUNT: 14.4 % (ref 11.6–14.5)
GLOBULIN SER CALC-MCNC: 4.2 G/DL (ref 2–4)
GLUCOSE BLD STRIP.AUTO-MCNC: 192 MG/DL (ref 70–110)
GLUCOSE SERPL-MCNC: 227 MG/DL (ref 74–99)
HCT VFR BLD AUTO: 33 % (ref 35–45)
HGB BLD-MCNC: 10.6 G/DL (ref 12–16)
INR PPP: 1 (ref 0.8–1.2)
LYMPHOCYTES # BLD: 1.2 K/UL (ref 0.9–3.6)
LYMPHOCYTES NFR BLD: 16 % (ref 21–52)
MCH RBC QN AUTO: 27.2 PG (ref 24–34)
MCHC RBC AUTO-ENTMCNC: 32.1 G/DL (ref 31–37)
MCV RBC AUTO: 84.6 FL (ref 74–97)
MONOCYTES # BLD: 0.5 K/UL (ref 0.05–1.2)
MONOCYTES NFR BLD: 6 % (ref 3–10)
NEUTS SEG # BLD: 5.7 K/UL (ref 1.8–8)
NEUTS SEG NFR BLD: 74 % (ref 40–73)
PLATELET # BLD AUTO: 267 K/UL (ref 135–420)
PMV BLD AUTO: 11 FL (ref 9.2–11.8)
POTASSIUM SERPL-SCNC: 4.2 MMOL/L (ref 3.5–5.5)
PROT SERPL-MCNC: 7.1 G/DL (ref 6.4–8.2)
PROTHROMBIN TIME: 13.2 SEC (ref 11.5–15.2)
RBC # BLD AUTO: 3.9 M/UL (ref 4.2–5.3)
SODIUM SERPL-SCNC: 139 MMOL/L (ref 136–145)
TROPONIN I SERPL-MCNC: 0.03 NG/ML (ref 0–0.04)
TROPONIN I SERPL-MCNC: 0.03 NG/ML (ref 0–0.04)
WBC # BLD AUTO: 7.8 K/UL (ref 4.6–13.2)

## 2020-03-09 PROCEDURE — 93005 ELECTROCARDIOGRAM TRACING: CPT

## 2020-03-09 PROCEDURE — 74011636320 HC RX REV CODE- 636/320: Performed by: EMERGENCY MEDICINE

## 2020-03-09 PROCEDURE — 85730 THROMBOPLASTIN TIME PARTIAL: CPT

## 2020-03-09 PROCEDURE — 83880 ASSAY OF NATRIURETIC PEPTIDE: CPT

## 2020-03-09 PROCEDURE — 85025 COMPLETE CBC W/AUTO DIFF WBC: CPT

## 2020-03-09 PROCEDURE — 71045 X-RAY EXAM CHEST 1 VIEW: CPT

## 2020-03-09 PROCEDURE — 80053 COMPREHEN METABOLIC PANEL: CPT

## 2020-03-09 PROCEDURE — 36415 COLL VENOUS BLD VENIPUNCTURE: CPT

## 2020-03-09 PROCEDURE — 82962 GLUCOSE BLOOD TEST: CPT

## 2020-03-09 PROCEDURE — 74011000258 HC RX REV CODE- 258: Performed by: EMERGENCY MEDICINE

## 2020-03-09 PROCEDURE — 71275 CT ANGIOGRAPHY CHEST: CPT

## 2020-03-09 PROCEDURE — 99285 EMERGENCY DEPT VISIT HI MDM: CPT

## 2020-03-09 PROCEDURE — 77010033678 HC OXYGEN DAILY

## 2020-03-09 PROCEDURE — 74011250636 HC RX REV CODE- 250/636: Performed by: HOSPITALIST

## 2020-03-09 PROCEDURE — 65270000029 HC RM PRIVATE

## 2020-03-09 PROCEDURE — 82550 ASSAY OF CK (CPK): CPT

## 2020-03-09 PROCEDURE — 85610 PROTHROMBIN TIME: CPT

## 2020-03-09 RX ORDER — BUDESONIDE 0.5 MG/2ML
500 INHALANT ORAL
Status: DISCONTINUED | OUTPATIENT
Start: 2020-03-10 | End: 2020-03-11 | Stop reason: HOSPADM

## 2020-03-09 RX ORDER — SPIRONOLACTONE 25 MG/1
12.5 TABLET ORAL DAILY
Status: DISCONTINUED | OUTPATIENT
Start: 2020-03-10 | End: 2020-03-11 | Stop reason: HOSPADM

## 2020-03-09 RX ORDER — SODIUM CHLORIDE 9 MG/ML
100 INJECTION, SOLUTION INTRAVENOUS ONCE
Status: COMPLETED | OUTPATIENT
Start: 2020-03-09 | End: 2020-03-09

## 2020-03-09 RX ORDER — INSULIN LISPRO 100 [IU]/ML
INJECTION, SOLUTION INTRAVENOUS; SUBCUTANEOUS
Status: DISCONTINUED | OUTPATIENT
Start: 2020-03-10 | End: 2020-03-11 | Stop reason: HOSPADM

## 2020-03-09 RX ORDER — MAGNESIUM SULFATE 100 %
16 CRYSTALS MISCELLANEOUS AS NEEDED
Status: DISCONTINUED | OUTPATIENT
Start: 2020-03-09 | End: 2020-03-11 | Stop reason: HOSPADM

## 2020-03-09 RX ORDER — CLOPIDOGREL BISULFATE 75 MG/1
75 TABLET ORAL DAILY
Status: DISCONTINUED | OUTPATIENT
Start: 2020-03-10 | End: 2020-03-11 | Stop reason: HOSPADM

## 2020-03-09 RX ORDER — GUAIFENESIN 100 MG/5ML
81 LIQUID (ML) ORAL DAILY
Status: DISCONTINUED | OUTPATIENT
Start: 2020-03-10 | End: 2020-03-11 | Stop reason: HOSPADM

## 2020-03-09 RX ORDER — ZOLPIDEM TARTRATE 5 MG/1
5 TABLET ORAL
Status: DISCONTINUED | OUTPATIENT
Start: 2020-03-09 | End: 2020-03-11 | Stop reason: HOSPADM

## 2020-03-09 RX ORDER — ATORVASTATIN CALCIUM 20 MG/1
40 TABLET, FILM COATED ORAL DAILY
Status: DISCONTINUED | OUTPATIENT
Start: 2020-03-10 | End: 2020-03-11 | Stop reason: HOSPADM

## 2020-03-09 RX ORDER — VALSARTAN 40 MG/1
40 TABLET ORAL DAILY
Status: DISCONTINUED | OUTPATIENT
Start: 2020-03-10 | End: 2020-03-11 | Stop reason: HOSPADM

## 2020-03-09 RX ORDER — FUROSEMIDE 10 MG/ML
40 INJECTION INTRAMUSCULAR; INTRAVENOUS 2 TIMES DAILY
Status: DISCONTINUED | OUTPATIENT
Start: 2020-03-09 | End: 2020-03-10

## 2020-03-09 RX ORDER — ENOXAPARIN SODIUM 100 MG/ML
40 INJECTION SUBCUTANEOUS EVERY 24 HOURS
Status: DISCONTINUED | OUTPATIENT
Start: 2020-03-09 | End: 2020-03-11 | Stop reason: HOSPADM

## 2020-03-09 RX ORDER — CARVEDILOL 6.25 MG/1
6.25 TABLET ORAL 2 TIMES DAILY WITH MEALS
Status: DISCONTINUED | OUTPATIENT
Start: 2020-03-10 | End: 2020-03-11 | Stop reason: HOSPADM

## 2020-03-09 RX ADMIN — ENOXAPARIN SODIUM 40 MG: 40 INJECTION SUBCUTANEOUS at 23:07

## 2020-03-09 RX ADMIN — SODIUM CHLORIDE 97 ML: 900 INJECTION, SOLUTION INTRAVENOUS at 19:17

## 2020-03-09 RX ADMIN — FUROSEMIDE 40 MG: 10 INJECTION, SOLUTION INTRAMUSCULAR; INTRAVENOUS at 23:08

## 2020-03-09 RX ADMIN — IOPAMIDOL 75 ML: 755 INJECTION, SOLUTION INTRAVENOUS at 19:16

## 2020-03-09 NOTE — ED PROVIDER NOTES
EMERGENCY DEPARTMENT HISTORY AND PHYSICAL EXAM    5:19 PM      Date: 3/9/2020  Patient Name: Brennan Lee    History of Presenting Illness     Chief Complaint   Patient presents with    Shortness of Breath         History Provided By: Patient      Additional History (Context): Brennan Lee is a 75 y.o. female with PMHx of hypertension, diabetes, COPD, cardiomyopathy, lung cancer, and CAD who presents with shortness of breath. Associated symptoms include cough and wheezing. She states her symptoms have been ongoing for about 2 days and have worsened since onset. She states she has home O2, but only uses it when needed. She states she ran out of O2 at home and called Dr. Demian Zamora (Cardiology) who told her to come to the ED. Patient is also followed by Dr. Missy Peterson (Pulmonology). She had an appointment with Dr. Missy Peterson today but was unable to make it due to feeling short of breath and coming into the ED. She states she has been using her nebulizer and inhaler as well. She states her medication was changed a couple months ago due to cost and insurance coverage. She is taking aspirin but no other blood thinners. No other concerns or symptoms at this time. PCP: Gurmeet Bejarano MD    Chief Complaint: Shortness of breath  Duration:  2 days  Timing:  Worsening  Location: N/A  Quality: N/A  Severity: N/A  Modifying Factors: Using inhaler and nebulizer at home, ran out of O2 at home. Associated Symptoms: cough, wheezing    Current Outpatient Medications   Medication Sig Dispense Refill    valsartan (DIOVAN) 40 mg tablet Take 1 Tab by mouth daily. 30 Tab 6    clopidogreL (PLAVIX) 75 mg tab Take 1 Tab by mouth daily. 30 Tab 5    aspirin 81 mg chewable tablet Take 1 Tab by mouth daily. 30 Tab 0    spironolactone (ALDACTONE) 25 mg tablet Take 0.5 Tabs by mouth daily. 30 Tab 6    carvediloL (COREG) 6.25 mg tablet Take 1 Tab by mouth two (2) times daily (with meals).  60 Tab 6    Blood Pressure Test Kit-Large kit Take blood pressure as needed 1 Kit 0    atorvastatin (LIPITOR) 20 mg tablet Take 2 Tabs by mouth daily. 30 Tab 0    insulin glargine (LANTUS U-100 INSULIN) 100 unit/mL injection 20 Units by SubCUTAneous route daily. Indications: type 2 diabetes mellitus      OTHER       budesonide (PULMICORT) 0.25 mg/2 mL nbsp by Nebulization route.  inhalational spacing device 1 Each by Does Not Apply route as needed. 1 Device 0    insulin lispro protamin/lispro (HUMALOG MIX 75-25 KWIKPEN SC) by SubCUTAneous route two (2) times a day. Pt reported sliding scale         Past History     Past Medical History:  Past Medical History:   Diagnosis Date    CAD (coronary artery disease)     S/P Mid LAD 2.5 X 23 mm XIENCE MAHAD ()    Cardiomyopathy ([facility]     LVEF 25% ()    COPD (chronic obstructive pulmonary disease) ([facility]     Diabetes mellitus (HCC)     Emphysema of lung ([facility]     Hypertension     Lung cancer ([facility]        Past Surgical History:  Past Surgical History:   Procedure Laterality Date    ABDOMEN SURGERY PROC UNLISTED      HX APPENDECTOMY      HX TONSILLECTOMY         Family History:  History reviewed. No pertinent family history. Social History:  Social History     Tobacco Use    Smoking status: Former Smoker     Years: 24.00     Last attempt to quit: 1978     Years since quittin.6    Smokeless tobacco: Never Used   Substance Use Topics    Alcohol use: No    Drug use: No       Allergies: Allergies   Allergen Reactions    Lisinopril Cough         Review of Systems       Review of Systems   Constitutional: Negative for activity change, fatigue and fever. HENT: Negative for congestion and rhinorrhea. Eyes: Negative for visual disturbance. Respiratory: Positive for cough, shortness of breath and wheezing. Cardiovascular: Negative for chest pain and palpitations. Gastrointestinal: Negative for abdominal pain, diarrhea, nausea and vomiting.    Genitourinary: Negative for dysuria and hematuria. Musculoskeletal: Negative for back pain. Skin: Negative for rash. Neurological: Negative for dizziness, weakness and light-headedness. All other systems reviewed and are negative. Physical Exam     Visit Vitals  /86   Pulse 91   Temp 98.1 °F (36.7 °C)   Resp 22   Ht 5' 7\" (1.702 m)   Wt 84.4 kg (186 lb)   SpO2 100%   BMI 29.13 kg/m²         Physical Exam  Vitals signs and nursing note reviewed. Constitutional:       General: She is not in acute distress. Appearance: She is well-developed. HENT:      Head: Normocephalic and atraumatic. Right Ear: External ear normal.      Left Ear: External ear normal.      Nose: Nose normal.   Eyes:      General: No scleral icterus. Conjunctiva/sclera: Conjunctivae normal.      Pupils: Pupils are equal, round, and reactive to light. Neck:      Musculoskeletal: Normal range of motion and neck supple. Trachea: No tracheal deviation. Cardiovascular:      Rate and Rhythm: Normal rate and regular rhythm. Pulmonary:      Effort: Tachypnea present. Comments: Decreased breath sounds at bilateral bases  Chest:      Chest wall: No tenderness. Abdominal:      General: Bowel sounds are normal. There is no distension. Palpations: Abdomen is soft. Tenderness: There is no abdominal tenderness. There is no guarding or rebound. Musculoskeletal: Normal range of motion. General: No tenderness. Skin:     General: Skin is warm and dry. Neurological:      Mental Status: She is alert and oriented to person, place, and time. Cranial Nerves: No cranial nerve deficit. Coordination: Coordination normal.   Psychiatric:         Behavior: Behavior normal.         Thought Content:  Thought content normal.         Judgment: Judgment normal.           Diagnostic Study Results     Labs -  Recent Results (from the past 12 hour(s))   CBC WITH AUTOMATED DIFF    Collection Time: 03/09/20  4:16 PM   Result Value Ref Range WBC 7.8 4.6 - 13.2 K/uL    RBC 3.90 (L) 4.20 - 5.30 M/uL    HGB 10.6 (L) 12.0 - 16.0 g/dL    HCT 33.0 (L) 35.0 - 45.0 %    MCV 84.6 74.0 - 97.0 FL    MCH 27.2 24.0 - 34.0 PG    MCHC 32.1 31.0 - 37.0 g/dL    RDW 14.4 11.6 - 14.5 %    PLATELET 152 394 - 426 K/uL    MPV 11.0 9.2 - 11.8 FL    NEUTROPHILS 74 (H) 40 - 73 %    LYMPHOCYTES 16 (L) 21 - 52 %    MONOCYTES 6 3 - 10 %    EOSINOPHILS 4 0 - 5 %    BASOPHILS 0 0 - 2 %    ABS. NEUTROPHILS 5.7 1.8 - 8.0 K/UL    ABS. LYMPHOCYTES 1.2 0.9 - 3.6 K/UL    ABS. MONOCYTES 0.5 0.05 - 1.2 K/UL    ABS. EOSINOPHILS 0.3 0.0 - 0.4 K/UL    ABS. BASOPHILS 0.0 0.0 - 0.1 K/UL    DF AUTOMATED     METABOLIC PANEL, COMPREHENSIVE    Collection Time: 03/09/20  4:16 PM   Result Value Ref Range    Sodium 139 136 - 145 mmol/L    Potassium 4.2 3.5 - 5.5 mmol/L    Chloride 108 100 - 111 mmol/L    CO2 27 21 - 32 mmol/L    Anion gap 4 3.0 - 18 mmol/L    Glucose 227 (H) 74 - 99 mg/dL    BUN 14 7.0 - 18 MG/DL    Creatinine 0.64 0.6 - 1.3 MG/DL    BUN/Creatinine ratio 22 (H) 12 - 20      GFR est AA >60 >60 ml/min/1.73m2    GFR est non-AA >60 >60 ml/min/1.73m2    Calcium 8.9 8.5 - 10.1 MG/DL    Bilirubin, total 0.4 0.2 - 1.0 MG/DL    ALT (SGPT) 14 13 - 56 U/L    AST (SGOT) 15 10 - 38 U/L    Alk.  phosphatase 147 (H) 45 - 117 U/L    Protein, total 7.1 6.4 - 8.2 g/dL    Albumin 2.9 (L) 3.4 - 5.0 g/dL    Globulin 4.2 (H) 2.0 - 4.0 g/dL    A-G Ratio 0.7 (L) 0.8 - 1.7     NT-PRO BNP    Collection Time: 03/09/20  4:16 PM   Result Value Ref Range    NT pro-BNP 3,752 (H) 0 - 900 PG/ML   CARDIAC PANEL,(CK, CKMB & TROPONIN)    Collection Time: 03/09/20  4:16 PM   Result Value Ref Range    CK 68 26 - 192 U/L    CK - MB 1.8 <3.6 ng/ml    CK-MB Index 2.6 0.0 - 4.0 %    Troponin-I, QT 0.03 0.0 - 0.045 NG/ML   PROTHROMBIN TIME + INR    Collection Time: 03/09/20  4:16 PM   Result Value Ref Range    Prothrombin time 13.2 11.5 - 15.2 sec    INR 1.0 0.8 - 1.2     PTT    Collection Time: 03/09/20  4:16 PM   Result Value Ref Range    aPTT 28.5 23.0 - 36.4 SEC   EKG, 12 LEAD, INITIAL    Collection Time: 03/09/20  5:35 PM   Result Value Ref Range    Ventricular Rate 85 BPM    Atrial Rate 85 BPM    P-R Interval 152 ms    QRS Duration 84 ms    Q-T Interval 404 ms    QTC Calculation (Bezet) 480 ms    Calculated P Axis 39 degrees    Calculated R Axis -6 degrees    Calculated T Axis 71 degrees    Diagnosis       Normal sinus rhythm  Moderate voltage criteria for LVH, may be normal variant ( R in aVL , [place]   product )  Nonspecific T wave abnormality  Prolonged QT  Abnormal ECG  When compared with ECG of 18-DEC-2019 11:25,  Nonspecific T wave abnormality no longer evident in Anterior leads         Radiologic Studies -   XR CHEST PORT   Final Result   IMPRESSION:         1. Right IJ approach Mediport catheter in expected/stable position. 2. Volume loss in the right hemithorax with accompanying paramediastinal   parenchymal density likely in keeping with combination of atelectasis and   posttreatment fibrosis as seen on recent prior chest CT. Small right pleural   effusion. CTA CHEST W OR W WO CONT    (Results Pending)         Medical Decision Making     It should be noted that I, Jose De Jesus Licona DO will be the provider of record for this patient. I reviewed the vital signs, available nursing notes, past medical history, past surgical history, family history and social history. Vital Signs-Reviewed the patient's vital signs. Pulse Oximetry Analysis -  2L of O2 via NC , nml    Cardiac Monitor:  Rate: 88 BPM    EKG: Interpreted by the EP.    Time Interpreted: 17:35   Rate: 85 BPM   Rhythm: Sinus Rhythm   Interpretation: No acute ST changes    Records Reviewed: Nursing Notes and Old Medical Records (Time of Review: 5:19 PM)    Orders Placed This Encounter    XR CHEST PORT    CTA CHEST W OR W WO CONT    CBC WITH AUTOMATED DIFF    COMPREHENSIVE METABOLIC PANEL    PRO-BNP    CARDIAC PANEL,(CK, CKMB & TROPONIN)  PROTHROMBIN TIME + INR    PTT    [facility] CONTINUOUS    EKG, 12 LEAD, INITIAL    iopamidoL (ISOVUE-370) 76 % injection 100 mL    0.9% sodium chloride infusion 100 mL    IP CONSULT TO HOSPITALIST       ED Course: Progress Notes, Reevaluation, and Consults:  6:56 PM  Patient is now agreeable to letting us use her port as we have been unable to obtain peripheral access that she requested for CTA of chest    Provider Notes (Medical Decision Making):   Patient is a 78-year-old female with a history of lung cancer, COPD, and cardiomyopathy who comes in complaining of increased work of breathing over the last week but particularly today. Patient had used oxygen in the past but is not currently using oxygen at home. Today she has a 2 L requirement. No wheezing appreciated on exam.  Chest x-ray with pleural effusion that does not appear small. Patient has not had a CTA since last year and had a recent chest CT on 224 with question of cancer. She is afebrile with otherwise benign appearing blood work except for a elevated BNP. She was supposed to see her cardiologist tomorrow and pulmonologist apparently wanted to do a bronchoscopy. She would have gone to her outpatient appointments but she could not breathe without oxygen so she was told to go to the emergency department. Given new oxygen requirement, stable pleural effusion, and absence of wheezing, concern the patient has underlying PE. CTA ordered. There was some resistance of the patient using her port but she is now agreeable to that. Patient was also supposed to have an echo but that has not happened per her. States that because it was never scheduled. Regardless, given new hypoxia and oxygen requirement, will require admission today. 7:00 PM : Pt care transferred to [address] [place] [address] , hospitalist provider. History of patient complaint(s), available diagnostic reports and current treatment plan has been discussed thoroughly. Diagnosis     Clinical Impression:   1. Respiratory distress    2. Hypoxia        Disposition: Admit    Follow-up Information    None          Patient's Medications   Start Taking    No medications on file   Continue Taking    ASPIRIN 81 MG CHEWABLE TABLET    Take 1 Tab by mouth daily. ATORVASTATIN (LIPITOR) 20 MG TABLET    Take 2 Tabs by mouth daily. BLOOD PRESSURE TEST KIT-LARGE KIT    Take blood pressure as needed    BUDESONIDE (PULMICORT) 0.25 MG/2 ML NBSP    by Nebulization route. CARVEDILOL (COREG) 6.25 MG TABLET    Take 1 Tab by mouth two (2) times daily (with meals). CLOPIDOGREL (PLAVIX) 75 MG TAB    Take 1 Tab by mouth daily. INHALATIONAL SPACING DEVICE    1 Each by Does Not Apply route as needed. INSULIN GLARGINE (LANTUS U-100 INSULIN) 100 UNIT/ML INJECTION    20 Units by SubCUTAneous route daily. Indications: type 2 diabetes mellitus    INSULIN LISPRO PROTAMIN/LISPRO (HUMALOG MIX 75-25 KWIKPEN SC)    by SubCUTAneous route two (2) times a day. Pt reported sliding scale    OTHER        SPIRONOLACTONE (ALDACTONE) 25 MG TABLET    Take 0.5 Tabs by mouth daily. VALSARTAN (DIOVAN) 40 MG TABLET    Take 1 Tab by mouth daily. These Medications have changed    No medications on file   Stop Taking    PREDNISONE (DELTASONE) 5 MG TABLET    Take 5 mg by mouth. REVEFENACIN (YUPELRI) 175 MCG/3 ML NEBU NEBULIZER SOLUTION    175 mcg by Nebulization route daily. _______________________________       Vishal Zhao acting as a scribe for and in the presence of Hope Odom DO      March 09, 2020 at 7:20 PM       Provider Attestation:      I personally performed the services described in the documentation, reviewed the documentation, as recorded by the scribe in my presence, and it accurately and completely records my words and actions.  March 09, 2020 at 7:20 PM - Hope MCGINNIS DO       _______________________________

## 2020-03-09 NOTE — ED TRIAGE NOTES
Per EMS-\"Patient complains of shortness of breath and states she ran out of her oxygen at home. She feels much better after placing her oxygen. \"

## 2020-03-09 NOTE — ED NOTES
Patient agreed to have power port accessed. Port to R side of chest accessed. Blood return noted, line flushed. CT at bedside to take pt.

## 2020-03-09 NOTE — ED TRIAGE NOTES
EMS brought pt in sats 88% on RA at home. Placed on 2L sats now 100% on RA. Vitals stable, no acute signs of distress. Will continue to monitor.

## 2020-03-09 NOTE — TELEPHONE ENCOUNTER
Patient called and states she is have severe shortness of breath. She wants to go to ER but states she does not have a portable tank and she is afraid to leave the house without it. She seems to be in a great deal of distress when speaking with her.       Verbal order and read back per Laquita Rosales MD  Patient should call 911 to come get her and go to ER

## 2020-03-09 NOTE — ED NOTES
Patient does not want port accessed at this time. Dr Erlin Castaneda made aware of this. Ultra sound guided IV access being attempted at bedside. So that CT can be done.

## 2020-03-10 ENCOUNTER — APPOINTMENT (OUTPATIENT)
Dept: NON INVASIVE DIAGNOSTICS | Age: 65
DRG: 293 | End: 2020-03-10
Attending: HOSPITALIST
Payer: MEDICARE

## 2020-03-10 LAB
ANION GAP SERPL CALC-SCNC: 7 MMOL/L (ref 3–18)
ATRIAL RATE: 85 BPM
AV PEAK GRADIENT: 63.04 MMHG
BASOPHILS # BLD: 0.1 K/UL (ref 0–0.1)
BASOPHILS NFR BLD: 1 % (ref 0–2)
BUN SERPL-MCNC: 12 MG/DL (ref 7–18)
BUN/CREAT SERPL: 18 (ref 12–20)
CALCIUM SERPL-MCNC: 9.1 MG/DL (ref 8.5–10.1)
CALCULATED P AXIS, ECG09: 39 DEGREES
CALCULATED R AXIS, ECG10: -6 DEGREES
CALCULATED T AXIS, ECG11: 71 DEGREES
CHLORIDE SERPL-SCNC: 105 MMOL/L (ref 100–111)
CK MB CFR SERPL CALC: 2.7 % (ref 0–4)
CK MB CFR SERPL CALC: NORMAL % (ref 0–4)
CK MB SERPL-MCNC: 1.5 NG/ML (ref 5–25)
CK MB SERPL-MCNC: <1 NG/ML (ref 5–25)
CK SERPL-CCNC: 53 U/L (ref 26–192)
CK SERPL-CCNC: 55 U/L (ref 26–192)
CO2 SERPL-SCNC: 27 MMOL/L (ref 21–32)
CREAT SERPL-MCNC: 0.66 MG/DL (ref 0.6–1.3)
DIAGNOSIS, 93000: NORMAL
DIFFERENTIAL METHOD BLD: ABNORMAL
ECHO AV REGURGITANT PHT: 465.9 CM
ECHO IVC SNIFF: 1.68 CM
ECHO LV EDV A2C: 154.9 ML
ECHO LV EDV A4C: 157.2 ML
ECHO LV EDV BP: 157.4 ML (ref 56–104)
ECHO LV EDV INDEX A4C: 80.2 ML/M2
ECHO LV EDV INDEX BP: 80.3 ML/M2
ECHO LV EDV NDEX A2C: 79 ML/M2
ECHO LV EJECTION FRACTION A2C: 33 %
ECHO LV EJECTION FRACTION A4C: 30 %
ECHO LV EJECTION FRACTION BIPLANE: 31.9 % (ref 55–100)
ECHO LV ESV A2C: 103.5 ML
ECHO LV ESV A4C: 110.1 ML
ECHO LV ESV BP: 107.2 ML (ref 19–49)
ECHO LV ESV INDEX A2C: 52.8 ML/M2
ECHO LV ESV INDEX A4C: 56.2 ML/M2
ECHO LV ESV INDEX BP: 54.7 ML/M2
ECHO LVOT PEAK GRADIENT: 4.8 MMHG
ECHO LVOT PEAK VELOCITY: 109.59 CM/S
ECHO LVOT VTI: 21.23 CM
ECHO MV A VELOCITY: 113.22 CM/S
ECHO MV E DECELERATION TIME (DT): 150.9 MS
ECHO MV E VELOCITY: 47.7 CM/S
ECHO MV E/A RATIO: 0.42
ECHO PULMONARY ARTERY SYSTOLIC PRESSURE (PASP): 31.5 MMHG
ECHO RA MINOR AXIS: 3.37 CM
ECHO TV REGURGITANT MAX VELOCITY: 266.69 CM/S
ECHO TV REGURGITANT PEAK GRADIENT: 28.4 MMHG
EOSINOPHIL # BLD: 0.3 K/UL (ref 0–0.4)
EOSINOPHIL NFR BLD: 4 % (ref 0–5)
ERYTHROCYTE [DISTWIDTH] IN BLOOD BY AUTOMATED COUNT: 14.4 % (ref 11.6–14.5)
EST. AVERAGE GLUCOSE BLD GHB EST-MCNC: 266 MG/DL
GLUCOSE BLD STRIP.AUTO-MCNC: 211 MG/DL (ref 70–110)
GLUCOSE BLD STRIP.AUTO-MCNC: 300 MG/DL (ref 70–110)
GLUCOSE BLD STRIP.AUTO-MCNC: 321 MG/DL (ref 70–110)
GLUCOSE BLD STRIP.AUTO-MCNC: 354 MG/DL (ref 70–110)
GLUCOSE SERPL-MCNC: 184 MG/DL (ref 74–99)
HBA1C MFR BLD: 10.9 % (ref 4.2–5.6)
HCT VFR BLD AUTO: 32.9 % (ref 35–45)
HGB BLD-MCNC: 10.5 G/DL (ref 12–16)
LVOT MG: 2.93 MMHG
LVOT MV: 0.81 CM/S
LVSV (MOD BI): 25.14 ML
LVSV (MOD SINGLE 4C): 23.61 ML
LVSV (MOD SINGLE): 25.74 ML
LYMPHOCYTES # BLD: 1.2 K/UL (ref 0.9–3.6)
LYMPHOCYTES NFR BLD: 16 % (ref 21–52)
MCH RBC QN AUTO: 27.1 PG (ref 24–34)
MCHC RBC AUTO-ENTMCNC: 31.9 G/DL (ref 31–37)
MCV RBC AUTO: 84.8 FL (ref 74–97)
MONOCYTES # BLD: 0.7 K/UL (ref 0.05–1.2)
MONOCYTES NFR BLD: 10 % (ref 3–10)
MV DEC SLOPE: 3.16
NEUTS SEG # BLD: 5.4 K/UL (ref 1.8–8)
NEUTS SEG NFR BLD: 69 % (ref 40–73)
P-R INTERVAL, ECG05: 152 MS
PISA AR MAX VEL: 396.99 CM/S
PLATELET # BLD AUTO: 275 K/UL (ref 135–420)
PMV BLD AUTO: 11.1 FL (ref 9.2–11.8)
POTASSIUM SERPL-SCNC: 3.4 MMOL/L (ref 3.5–5.5)
Q-T INTERVAL, ECG07: 404 MS
QRS DURATION, ECG06: 84 MS
QTC CALCULATION (BEZET), ECG08: 480 MS
RBC # BLD AUTO: 3.88 M/UL (ref 4.2–5.3)
SODIUM SERPL-SCNC: 139 MMOL/L (ref 136–145)
TROPONIN I SERPL-MCNC: 0.02 NG/ML (ref 0–0.04)
TROPONIN I SERPL-MCNC: 0.03 NG/ML (ref 0–0.04)
VENTRICULAR RATE, ECG03: 85 BPM
WBC # BLD AUTO: 7.7 K/UL (ref 4.6–13.2)

## 2020-03-10 PROCEDURE — 65270000029 HC RM PRIVATE

## 2020-03-10 PROCEDURE — 74011250636 HC RX REV CODE- 250/636: Performed by: HOSPITALIST

## 2020-03-10 PROCEDURE — 74011636637 HC RX REV CODE- 636/637: Performed by: HOSPITALIST

## 2020-03-10 PROCEDURE — 82962 GLUCOSE BLOOD TEST: CPT

## 2020-03-10 PROCEDURE — 74011250637 HC RX REV CODE- 250/637: Performed by: HOSPITALIST

## 2020-03-10 PROCEDURE — 85025 COMPLETE CBC W/AUTO DIFF WBC: CPT

## 2020-03-10 PROCEDURE — 93308 TTE F-UP OR LMTD: CPT

## 2020-03-10 PROCEDURE — 94640 AIRWAY INHALATION TREATMENT: CPT

## 2020-03-10 PROCEDURE — 82550 ASSAY OF CK (CPK): CPT

## 2020-03-10 PROCEDURE — 74011000250 HC RX REV CODE- 250: Performed by: HOSPITALIST

## 2020-03-10 PROCEDURE — 80048 BASIC METABOLIC PNL TOTAL CA: CPT

## 2020-03-10 PROCEDURE — 77010033678 HC OXYGEN DAILY

## 2020-03-10 PROCEDURE — 83036 HEMOGLOBIN GLYCOSYLATED A1C: CPT

## 2020-03-10 PROCEDURE — 94761 N-INVAS EAR/PLS OXIMETRY MLT: CPT

## 2020-03-10 PROCEDURE — 36415 COLL VENOUS BLD VENIPUNCTURE: CPT

## 2020-03-10 RX ORDER — INSULIN LISPRO 100 [IU]/ML
3 INJECTION, SOLUTION INTRAVENOUS; SUBCUTANEOUS
Status: DISCONTINUED | OUTPATIENT
Start: 2020-03-10 | End: 2020-03-11 | Stop reason: HOSPADM

## 2020-03-10 RX ORDER — INSULIN GLARGINE 100 [IU]/ML
0.2 INJECTION, SOLUTION SUBCUTANEOUS DAILY
Status: DISCONTINUED | OUTPATIENT
Start: 2020-03-10 | End: 2020-03-10

## 2020-03-10 RX ORDER — POTASSIUM CHLORIDE 750 MG/1
40 TABLET, EXTENDED RELEASE ORAL
Status: COMPLETED | OUTPATIENT
Start: 2020-03-10 | End: 2020-03-10

## 2020-03-10 RX ORDER — FUROSEMIDE 10 MG/ML
40 INJECTION INTRAMUSCULAR; INTRAVENOUS 2 TIMES DAILY
Status: DISCONTINUED | OUTPATIENT
Start: 2020-03-10 | End: 2020-03-11

## 2020-03-10 RX ORDER — INSULIN GLARGINE 100 [IU]/ML
20 INJECTION, SOLUTION SUBCUTANEOUS DAILY
Status: DISCONTINUED | OUTPATIENT
Start: 2020-03-11 | End: 2020-03-11

## 2020-03-10 RX ADMIN — POTASSIUM CHLORIDE 40 MEQ: 750 TABLET, EXTENDED RELEASE ORAL at 08:19

## 2020-03-10 RX ADMIN — ENOXAPARIN SODIUM 40 MG: 40 INJECTION SUBCUTANEOUS at 23:29

## 2020-03-10 RX ADMIN — INSULIN LISPRO 8 UNITS: 100 INJECTION, SOLUTION INTRAVENOUS; SUBCUTANEOUS at 23:29

## 2020-03-10 RX ADMIN — CLOPIDOGREL BISULFATE 75 MG: 75 TABLET ORAL at 08:23

## 2020-03-10 RX ADMIN — INSULIN LISPRO 10 UNITS: 100 INJECTION, SOLUTION INTRAVENOUS; SUBCUTANEOUS at 12:04

## 2020-03-10 RX ADMIN — INSULIN LISPRO 3 UNITS: 100 INJECTION, SOLUTION INTRAVENOUS; SUBCUTANEOUS at 18:02

## 2020-03-10 RX ADMIN — ASPIRIN 81 MG 81 MG: 81 TABLET ORAL at 08:18

## 2020-03-10 RX ADMIN — VALSARTAN 40 MG: 40 TABLET, FILM COATED ORAL at 08:23

## 2020-03-10 RX ADMIN — INSULIN GLARGINE 17 UNITS: 100 INJECTION, SOLUTION SUBCUTANEOUS at 11:06

## 2020-03-10 RX ADMIN — FUROSEMIDE 40 MG: 10 INJECTION, SOLUTION INTRAMUSCULAR; INTRAVENOUS at 18:44

## 2020-03-10 RX ADMIN — ATORVASTATIN CALCIUM 40 MG: 20 TABLET, FILM COATED ORAL at 08:22

## 2020-03-10 RX ADMIN — FUROSEMIDE 40 MG: 10 INJECTION, SOLUTION INTRAMUSCULAR; INTRAVENOUS at 08:24

## 2020-03-10 RX ADMIN — BUDESONIDE 500 MCG: 0.5 SUSPENSION RESPIRATORY (INHALATION) at 07:15

## 2020-03-10 RX ADMIN — INSULIN LISPRO 4 UNITS: 100 INJECTION, SOLUTION INTRAVENOUS; SUBCUTANEOUS at 18:02

## 2020-03-10 RX ADMIN — CARVEDILOL 6.25 MG: 6.25 TABLET, FILM COATED ORAL at 08:23

## 2020-03-10 RX ADMIN — CARVEDILOL 6.25 MG: 6.25 TABLET, FILM COATED ORAL at 17:58

## 2020-03-10 RX ADMIN — POTASSIUM CHLORIDE 40 MEQ: 750 TABLET, EXTENDED RELEASE ORAL at 18:51

## 2020-03-10 RX ADMIN — INSULIN LISPRO 8 UNITS: 100 INJECTION, SOLUTION INTRAVENOUS; SUBCUTANEOUS at 07:59

## 2020-03-10 RX ADMIN — SPIRONOLACTONE 12.5 MG: 25 TABLET ORAL at 08:20

## 2020-03-10 RX ADMIN — BUDESONIDE 500 MCG: 0.5 SUSPENSION RESPIRATORY (INHALATION) at 20:09

## 2020-03-10 NOTE — PROGRESS NOTES
Respiratory Therapy Assessment Care Plan    Patient:  Denisse Merrill 75 y.o. female 3/10/2020 7:21 AM    Lung cancer ([facility] [C34.90]  CHF (congestive heart failure) ([facility] [I50.9]      Chest X-RAY:   Results from Hospital Encounter encounter on 03/09/20   XR CHEST PORT    Impression IMPRESSION:      1. Right IJ approach Mediport catheter in expected/stable position. 2. Volume loss in the right hemithorax with accompanying paramediastinal  parenchymal density likely in keeping with combination of atelectasis and  posttreatment fibrosis as seen on recent prior chest CT. Small right pleural  effusion. Results from [place] encounter on 04/24/19   XR CHEST PORT    Impression IMPRESSION:    No significant interval change given differences in technique. Continued  ill-defined right hilar and lower lobe airspace process, which is nonspecific  but given history may represent sequela of prior right lung cancer with  radiation therapy. Superimposed pneumonia is possible, although no definite  change is seen. Results from [place] encounter on 12/30/18   XR CHEST PA LAT    Impression IMPRESSION:    1. Shallow inspiration with interval increase of right lower lobe airspace  process. Follow-up is recommended. Patient has known history of neoplasm with  possible radiation treatment.                   Vital Signs:     Visit Vitals  /83 (BP 1 Location: Left arm, BP Patient Position: At rest)   Pulse 86   Temp 98.1 °F (36.7 °C)   Resp 17   Ht 5' 7\" (1.702 m)   Wt 84.4 kg (186 lb)   SpO2 97%   Breastfeeding No   BMI 29.13 kg/m²         Indications for treatment: COPD / CAD / Emphysema / Lung cancer / SOB      Plan of care: Pulmicort / Oxygen         Goal:  Patient with reduced SOB

## 2020-03-10 NOTE — ROUTINE PROCESS
Received on the unit from ED. Alert and oriented times four. Able to verbalize. Denies pain and discomfort at this time. Ambulated from stretcher to bed. Received call from Pharmacy regarding clarification of medications that the patient takes at home. Patient takes spironolactone 12.5 mg daily. She has spironolactone 25 mg tablets and she was instructed to take a half of a tablet daily. Patient also stated that her PCP changed her antihypertensive medication to Valsartan 40 mg. She previously was taking her blood pressure pill twice daily. Valsartan is prescribed as once daily. Patient states that she has been taking valsartan twice a day because she believed that her doctor had made a mistake. Patient educated that her valsartan 40 mg is to be taken once daily and this information was also conveyed to the pharmacy. Patient verbalized 100% understanding. PTA medications were given to the patient's spouse to take home upon leaving the unit. Primary nurse updated with information.     Terry Henry, RN, BSN

## 2020-03-10 NOTE — PROGRESS NOTES
conducted an initial consultation and Spiritual Assessment for Jovany Olson, who is a 75 y.o.,female. Patients Primary Language is: [place]. According to the patients EMR Latter-day Affiliation is: Non Evangelical.     The reason the Patient came to the hospital is:   Patient Active Problem List    Diagnosis Date Noted    CHF (congestive heart failure) ([facility] 03/09/2020    Lung cancer ([facility] 03/09/2020    Hyperlipidemia 03/09/2020    Throat cancer ([facility] 03/09/2020    CAD (coronary artery disease) 12/18/2019    Pneumonitis 11/26/2019    Arterial occlusion 11/26/2019    NSTEMI (non-ST elevated myocardial infarction) ([facility] 11/26/2019    COPD (chronic obstructive pulmonary disease) ([facility] 11/26/2019    Diabetes mellitus, type 2 ([facility] 11/26/2019    HTN (hypertension) 11/26/2019    Occlusion of artery 11/26/2019    Occlusion of vein 11/26/2019        The  provided the following Interventions:  Initiated a relationship of care and support with patient who is here simply for observation in room 2214. Listened empathically as she talked about her being here and how she had not had any surgery nor was she planning on having any in the near future. Patient is a woman of mar[place] as was apparent through her conversation. Provided information about Spiritual Care Services. Offered prayer and assurance of continued prayers on patients behalf. The following outcomes were achieved:  Patient shared limited information about her medical narrative and spiritual journey/beliefs. Patient processed feeling about current hospitalization. Patient expressed gratitude for pastoral care visit. Assessment:  Patient does not have any Evangelical/cultural needs that will affect patients preferences in health care. There are no further spiritual or Evangelical issues which require Spiritual Care Services interventions at this time.        Plan:  Chaplains will continue to follow and will provide pastoral care on an as needed/requested basis    . [facility]   Spiritual Care   [phone]

## 2020-03-10 NOTE — PROGRESS NOTES
Problem: Patient Education: Go to Patient Education Activity  Goal: Patient/Family Education  Outcome: Progressing Towards Goal     Problem: Heart Failure: Day 1  Goal: Off Pathway (Use only if patient is Off Pathway)  Outcome: Progressing Towards Goal  Goal: Activity/Safety  Outcome: Progressing Towards Goal  Goal: Consults, if ordered  Outcome: Progressing Towards Goal  Goal: Diagnostic Test/Procedures  Outcome: Progressing Towards Goal  Goal: Nutrition/Diet  Outcome: Progressing Towards Goal  Goal: Discharge Planning  Outcome: Progressing Towards Goal  Goal: Medications  Outcome: Progressing Towards Goal  Goal: Respiratory  Outcome: Progressing Towards Goal  Goal: Treatments/Interventions/Procedures  Outcome: Progressing Towards Goal  Goal: Psychosocial  Outcome: Progressing Towards Goal  Goal: *Oxygen saturation within defined limits  Outcome: Progressing Towards Goal  Goal: *Hemodynamically stable  Outcome: Progressing Towards Goal  Goal: *Optimal pain control at patient's stated goal  Outcome: Progressing Towards Goal  Goal: *Anxiety reduced or absent  Outcome: Progressing Towards Goal     Problem: Chronic Obstructive Pulmonary Disease (COPD)  Goal: *Oxygen saturation during activity within specified parameters  Outcome: Progressing Towards Goal  Goal: *Able to remain out of bed as prescribed  Outcome: Progressing Towards Goal  Goal: *Absence of hypoxia  Outcome: Progressing Towards Goal  Goal: *Optimize nutritional status  Outcome: Progressing Towards Goal     Problem: Diabetes Self-Management  Goal: *Disease process and treatment process  Description  Define diabetes and identify own type of diabetes; list 3 options for treating diabetes. Outcome: Progressing Towards Goal  Goal: *Incorporating nutritional management into lifestyle  Description  Describe effect of type, amount and timing of food on blood glucose; list 3 methods for planning meals.   Outcome: Progressing Towards Goal  Goal: *Incorporating physical activity into lifestyle  Description  State effect of exercise on blood glucose levels. Outcome: Progressing Towards Goal  Goal: *Developing strategies to promote health/change behavior  Description  Define the ABC's of diabetes; identify appropriate screenings, schedule and personal plan for screenings. Outcome: Progressing Towards Goal  Goal: *Using medications safely  Description  State effect of diabetes medications on diabetes; name diabetes medication taking, action and side effects. Outcome: Progressing Towards Goal  Goal: *Monitoring blood glucose, interpreting and using results  Description  Identify recommended blood glucose targets  and personal targets. Outcome: Progressing Towards Goal  Goal: *Prevention, detection, treatment of acute complications  Description  List symptoms of hyper- and hypoglycemia; describe how to treat low blood sugar and actions for lowering  high blood glucose level. Outcome: Progressing Towards Goal  Goal: *Prevention, detection and treatment of chronic complications  Description  Define the natural course of diabetes and describe the relationship of blood glucose levels to long term complications of diabetes.   Outcome: Progressing Towards Goal  Goal: *Developing strategies to address psychosocial issues  Description  Describe feelings about living with diabetes; identify support needed and support network  Outcome: Progressing Towards Goal     Problem: Patient Education: Go to Patient Education Activity  Goal: Patient/Family Education  Outcome: Progressing Towards Goal     Problem: Gas Exchange - Impaired  Goal: *Absence of hypoxia  Outcome: Progressing Towards Goal     Problem: Patient Education: Go to Patient Education Activity  Goal: Patient/Family Education  Outcome: Progressing Towards Goal

## 2020-03-10 NOTE — PROGRESS NOTES
Bedside and Verbal shift change report given to Kimberly [address] (oncoming nurse) by Tressa Rivera (offgoing nurse). Report included the following information SBAR, Kardex, Intake/Output, MAR, Accordion, Recent Results, Med Rec Status and Quality Measures.

## 2020-03-10 NOTE — H&P
History and Physical    Patient: Silke Christian               Sex: female          DOA: 3/9/2020       YOB: 1955      Age:  75 y.o.        LOS:  LOS: 0 days        HPI:     Silke Christian is a 75 y.o. female who presented to the ER with SOB and oxygen requirement. The patient has had oxygen at home in the past but has not required it in two months. She developed SOB at home and felt that she could not breath. She went and got her oxygen and it made her feel better. She felt that she could not leave the house without the oxygen. She did not have chest pain. She had coronary artery stent placed a couple of months ago. In the ER she is found to have fluid overload on CTA but no PE. She will be admitted for ongoing management. Past Medical History:   Diagnosis Date    CAD (coronary artery disease)     S/P Mid LAD 2.5 X 23 mm XIENCE MAHAD (12/19)    Cardiomyopathy ([facility]     LVEF 25% (11/19)    COPD (chronic obstructive pulmonary disease) ([facility]     Diabetes mellitus (HCC)     Emphysema of lung ([facility]     Hypertension     Lung cancer ([facility]        Social History:   Tobacco use:  Patient does not smoke now. She smoked for 30+ years but has since quit   Alcohol use:  Patient does not use alcohol   Patient lives with her `    Family History: Mother had DM and CAD   Father had CVA    Review of Systems    Constitutional:  No fever or weight loss  HEENT:  No headache or visual changes  Cardiovascular:  No chest pain or diaphoresis  Respiratory:  Shortness of breath as above, no cough  GI:  No nausea or vomitting.   No diarrhea  :  No hematuria or dysuria  Skin:  No rashes or moles  Neuro:  No seizures or syncope  Hematological:  No bruising or bleeding  Endocrine:  Known diabetes, no thyroid disease    Physical Exam:      Visit Vitals  BP (!) 155/97   Pulse 94   Temp 98.3 °F (36.8 °C)   Resp 20   Ht 5' 7\" (1.702 m)   Wt 84.4 kg (186 lb)   SpO2 100%   Breastfeeding No   BMI 29.13 kg/m² Physical Exam:    Gen:  No distress, alert  HEENT:  Normal cephalic atraumatic, extra-occular movements are intact. Neck:  Supple, No JVD  Lungs:  Clear bilaterally, no wheeze, no rales, normal effort  Heart:  Regular Rate and Rhythm, normal S1 and S2, no edema  Abdomen:  Soft, non tender, normal bowel sounds, no guarding. Extremities:  Well perfused, no cyanosis or edema  Neurological:  Awake and alert, CN's are intact, normal strength throughout extremities  Skin:  No rashes or moles  Mental Status:  Normal thought process, does not appear anxious    Laboratory Studies:    BMP:   Lab Results   Component Value Date/Time     03/09/2020 04:16 PM    K 4.2 03/09/2020 04:16 PM     03/09/2020 04:16 PM    CO2 27 03/09/2020 04:16 PM    AGAP 4 03/09/2020 04:16 PM     (H) 03/09/2020 04:16 PM    BUN 14 03/09/2020 04:16 PM    CREA 0.64 03/09/2020 04:16 PM    GFRAA >60 03/09/2020 04:16 PM    GFRNA >60 03/09/2020 04:16 PM     CBC:   Lab Results   Component Value Date/Time    WBC 7.8 03/09/2020 04:16 PM    HGB 10.6 (L) 03/09/2020 04:16 PM    HCT 33.0 (L) 03/09/2020 04:16 PM     03/09/2020 04:16 PM     All Cardiac Markers in the last 24 hours:   Lab Results   Component Value Date/Time    CPK 68 03/09/2020 04:16 PM    CKMB 1.8 03/09/2020 04:16 PM    CKND1 2.6 03/09/2020 04:16 PM    TROIQ 0.03 03/09/2020 04:16 PM       Assessment/Plan     Principal Problem:    CHF (congestive heart failure) ([facility] (3/9/2020)    Active Problems:    COPD (chronic obstructive pulmonary disease) ([facility] (11/26/2019)      Diabetes mellitus, type 2 ([facility] (11/26/2019)      HTN (hypertension) (11/26/2019)      Hyperlipidemia (3/9/2020)      [facility] (3/9/2020)        PLAN:    Will treat with diuresis  Echocardiogram  Follow cardiac enzymes  BS control  BP control  Follow respiratory status  Cardiology consult, patient sees Dr Darek Paz. Medical management for stent appears to be Plavix and ASA.   Discussed with patient and  at the bedside, got history from . DVT prophylaxis.

## 2020-03-10 NOTE — PROGRESS NOTES
Problem: Discharge Planning  Goal: *Discharge to safe environment  Outcome: Progressing Towards Goal    Home    Care Management Interventions  PCP Verified by CM: Brenda Acosta)  Transition of Care Consult (CM Consult): Discharge Planning  Current Support Network: Lives with Spouse  Confirm Follow Up Transport: Family  The Plan for Transition of Care is Related to the Following Treatment Goals : home  Discharge Location  Discharge Placement: Home     Reason for Admission:   CHF                   RUR Score:     16                Plan for utilizing home health:          PCP: Edelmira Fountain   Are you a current patient: Yes   Approximate date of last visit: month ago                    Current Advanced Directive/Advance Care Plan: not on file                          Transition of Care Plan:       She verified her demographics, insurance [facility] and PCP ( Dr. Alden Mijares ) as correct on the facesheet. Lives with spouse, independent with ADL's and uses no DME  Patient has designated _______spouse Dre Spring _[phone]______________ to participate in his/her discharge plan and to receive any needed information. Plan at this time is home family to transport.

## 2020-03-10 NOTE — PROGRESS NOTES
Patient received sitting at edge of bed. A&Ox4, denies pain and discomfort. No distress noted. Frequently use items within reach. Bed locked in low position. Call bell within reach and Patient verbalized understanding of use for assistance and needs. 8439- Patient's  S/S coverage given. Kate Faria RD was called; awaiting call back. [address], RD to floor made aware of BG and coverage given said will address. 1800- Dr. Pritesh Kwan was called made aware that Patient's am K 3.4 T. O. received for Potassium Cl 40 melania PO x1 and okay to give scheduled Lasix. (RBV). 1843- Patient made aware of ordered Potassium said that she had been given \"four pills this morning.  Noted to [facility] KCL 40 meq PO given at 08:19 this am. Dr. Pritesh Kwan was called made aware said okay to give additional KCL 40 meq as ordered. (RBV)

## 2020-03-10 NOTE — DIABETES MGMT
NUTRITIONAL ASSESSMENT GLYCEMIC CONTROL/ PLAN OF CARE     Amanda Alvarez           75 y.o.           3/9/2020               CHF   Active Problems:   COPD , Diabetes mellitus, type 2 ,HTN , Hyperlipidemia  Throat cancer , Lung Cancer     INTERVENTIONS/PLAN:   Recommend adding  Lantus to 20 units/day,3 units lispro with meals plus corrective lispro resistant scale. Pt has been prescribed 24 units/d at home which he reports she doesn't always take. Rec 2g Na 0959-2314 calorie consistent CHO diet to promote glycemic control.    Pt and sister encouraged to attend outpatient DM education classes at [facility]. ASSESSMENT:   Nutritional Status:  Pt is 147% ideal weight. She has gained 11 lbs since 12/19 despite Cancer treatments.     Nutrition Diagnoses: Altered nutrition related labs due to T2DM as evidenced by A1C of 10.9% equivalent  to ave Blood Glucose of 267mg/dl for 2-3 months prior to admission  Obesity due to excess energy intake as evidenced by BMI of 30 kg/m2. Medication knowledge deficit as evidenced by pt confused Lantus with rapid acting humalog and was skipping Lantus doses.   Diabetes Management:        Recent blood glucose: Within target range (non-ICU: <140; ICU<180): []? Yes   [x]? No   Current Insulin regimen: To be increased to Lantus 20 units/day with 3 units meal time lispro plus corrective lispro, very insulin resistant dosing ACHS  Home medication/insulin regimen:Based on last [facility] fill 11/25/2019:  Lantus 40 units/day  Humalog 75/25 26 units TID at meals  HbA1c: 10.2% - ave BG ~ 243 mg/dL over past 3 months. Adequate glycemic control PTA:  []? Yes  [x]? No     SUBJECTIVE/OBJECTIVE:   Information obtained from:  pt  Diet: 2000 greg.consistent CHO , reduced calories to 1600 , 2g Na     Medications: [x]?                 Reviewed   Labs:   Lab Results   Component Value Date/Time     Hemoglobin A1c 10.1 (H) 11/26/2019 04:05 PM       Hemoglobin A1c 10.9 (H) 03/10/2020 03:50 AM     Lab Results   Component Value Date/Time    Sodium 139 03/10/2020 03:50 AM    Potassium 3.4 (L) 03/10/2020 03:50 AM    Chloride 105 03/10/2020 03:50 AM    CO2 27 03/10/2020 03:50 AM    Anion gap 7 03/10/2020 03:50 AM    Glucose 184 (H) 03/10/2020 03:50 AM    BUN 12 03/10/2020 03:50 AM    Creatinine 0.66 03/10/2020 03:50 AM    BUN/Creatinine ratio 18 03/10/2020 03:50 AM    GFR est AA >60 03/10/2020 03:50 AM    GFR est non-AA >60 03/10/2020 03:50 AM    Calcium 9.1 03/10/2020 03:50 AM        Anthropometrics: IBW : 59 kg (130 lb), % IBW (Calculated): 143 %, BMI (calculated): 30.kg/m2        Wt Readings from Last 3 Encounters:   03/10/20 84.4 kg (186 lb)   01/27/20 85.7 kg (189 lb)   12/18/19 79.4 kg (175 lb)         Ht Readings from Last 1 Encounters:    5' 6\" (1.676 m)      Estimated Nutrition Needs:  1700 Kcals/day, Protein (g): 70 g Fluid (ml): 1800 ml  Based on:   [x]? Actual BW      []? ABW     []? Adjusted BW         Nutrition Interventions:  DM self mgt education  7539-6458 calorie consistent CHO diet to promote glycemic and weight control. Goal:   Blood glucose will be within target range of  mg/dL by by 3/13/20. Weight maintenance (+/- 1-2 kg) by 3/20/20. Nutrition Monitoring and Evaluation       [x]? Monitor po intake on meal rounds  [x]? Continue inpatient monitoring and intervention  []? Other:     Nutrition Risk:  []? High                 [x]?   Moderate                 []?  Minimal/Uncompromised

## 2020-03-10 NOTE — PROGRESS NOTES
Medicine Progress Note    Patient: Luis Rushing   Age:  75 y.o.  DOA: 3/9/2020   Admit Dx / CC: [facility] [C34.90]  CHF (congestive heart failure) ([facility] [I50.9]  LOS:  LOS: 1 day     Assessment/Plan   Principal Problem:    CHF (congestive heart failure) ([facility] (3/9/2020)    Active Problems:    COPD (chronic obstructive pulmonary disease) ([facility] (11/26/2019)      Diabetes mellitus, type 2 ([facility] (11/26/2019)      HTN (hypertension) (11/26/2019)      Hyperlipidemia (3/9/2020)      Throat cancer ([facility] (3/9/2020)        Additional Plan notes     1)  Acute on chronic systolic HF   - strict I/o    - tele monitor   - FR 1200cc   - Lasix BID   - Echo pending   - likely dc in 1-2 days      DISPO     Anticipated Date of Discharge: 1-2 days  Anticipated Disposition (home, SNF) : home    Subjective:   Patient seen and examined. No complaints, NAD  SOB much improved    Objective:     Visit Vitals  /77   Pulse 96   Temp 97.9 °F (36.6 °C)   Resp 18   Ht 5' 7\" (1.702 m)   Wt 84.4 kg (186 lb)   SpO2 99%   Breastfeeding No   BMI 29.13 kg/m²       Physical Exam:  General appearance: alert, cooperative, no distress, appears stated age  Head: Normocephalic, without obvious abnormality, atraumatic  Neck: supple, trachea midline  Lungs: clear to auscultation bilaterally  Heart: regular rate and rhythm, S1, S2 normal, no murmur, click, rub or gallop  Abdomen: soft, non-tender. Bowel sounds normal. No masses,  no organomegaly  Extremities: extremities normal, atraumatic, no cyanosis or edema  Skin: Skin color, texture, turgor normal. No rashes or lesions  Neurologic: Grossly normal    Intake and Output:  Current Shift:  No intake/output data recorded.   Last three shifts:  03/08 1901 - 03/10 0700  In: -   Out: 1000 [Urine:1000]    Lab/Data Reviewed:  CMP:   Lab Results   Component Value Date/Time     03/10/2020 03:50 AM    K 3.4 (L) 03/10/2020 03:50 AM     03/10/2020 03:50 AM    CO2 27 03/10/2020 03:50 AM    AGAP 7 03/10/2020 03:50 AM     (H) 03/10/2020 03:50 AM    BUN 12 03/10/2020 03:50 AM    CREA 0.66 03/10/2020 03:50 AM    GFRAA >60 03/10/2020 03:50 AM    GFRNA >60 03/10/2020 03:50 AM    CA 9.1 03/10/2020 03:50 AM    ALB 2.9 (L) 03/09/2020 04:16 PM    TP 7.1 03/09/2020 04:16 PM    GLOB 4.2 (H) 03/09/2020 04:16 PM    AGRAT 0.7 (L) 03/09/2020 04:16 PM    SGOT 15 03/09/2020 04:16 PM    ALT 14 03/09/2020 04:16 PM     CBC:   Lab Results   Component Value Date/Time    WBC 7.7 03/10/2020 03:50 AM    HGB 10.5 (L) 03/10/2020 03:50 AM    HCT 32.9 (L) 03/10/2020 03:50 AM     03/10/2020 03:50 AM     All Cardiac Markers in the last 24 hours:   Lab Results   Component Value Date/Time    CPK 55 03/10/2020 03:50 AM    CPK 61 03/09/2020 10:00 PM    CPK 68 03/09/2020 04:16 PM    CKMB 1.5 03/10/2020 03:50 AM    CKMB 1.9 03/09/2020 10:00 PM    CKMB 1.8 03/09/2020 04:16 PM    CKND1 2.7 03/10/2020 03:50 AM    CKND1 3.1 03/09/2020 10:00 PM    CKND1 2.6 03/09/2020 04:16 PM    TROIQ 0.03 03/10/2020 03:50 AM    TROIQ 0.03 03/09/2020 10:00 PM    TROIQ 0.03 03/09/2020 04:16 PM       Medications Reviewed:  Current Facility-Administered Medications   Medication Dose Route Frequency    furosemide (LASIX) injection 40 mg  40 mg IntraVENous BID    insulin glargine (LANTUS) injection 17 Units  0.2 Units/kg SubCUTAneous DAILY    aspirin chewable tablet 81 mg  81 mg Oral DAILY    atorvastatin (LIPITOR) tablet 40 mg  40 mg Oral DAILY    budesonide (PULMICORT) 500 mcg/2 ml nebulizer suspension  500 mcg Nebulization BID RT    carvediloL (COREG) tablet 6.25 mg  6.25 mg Oral BID WITH MEALS    clopidogreL (PLAVIX) tablet 75 mg  75 mg Oral DAILY    spironolactone (ALDACTONE) tablet 12.5 mg  12.5 mg Oral DAILY    valsartan (DIOVAN) tablet 40 mg  40 mg Oral DAILY    zolpidem (AMBIEN) tablet 5 mg  5 mg Oral QHS PRN    enoxaparin (LOVENOX) injection 40 mg  40 mg SubCUTAneous Q24H    insulin lispro (HUMALOG) injection   SubCUTAneous AC&HS    glucose chewable tablet 16 g  16 g Oral PRN    glucagon (GLUCAGEN) injection 1 mg  1 mg IntraMUSCular PRN    dextrose 10 % infusion 125-250 mL  125-250 mL IntraVENous PRN       Aiea MD Timoteo    March 10, 2020

## 2020-03-11 VITALS
OXYGEN SATURATION: 96 % | HEART RATE: 87 BPM | RESPIRATION RATE: 20 BRPM | DIASTOLIC BLOOD PRESSURE: 53 MMHG | SYSTOLIC BLOOD PRESSURE: 115 MMHG | TEMPERATURE: 97.6 F | BODY MASS INDEX: 29.19 KG/M2 | HEIGHT: 67 IN | WEIGHT: 186 LBS

## 2020-03-11 LAB
ANION GAP SERPL CALC-SCNC: 6 MMOL/L (ref 3–18)
BUN SERPL-MCNC: 19 MG/DL (ref 7–18)
BUN/CREAT SERPL: 26 (ref 12–20)
CALCIUM SERPL-MCNC: 8.8 MG/DL (ref 8.5–10.1)
CHLORIDE SERPL-SCNC: 107 MMOL/L (ref 100–111)
CO2 SERPL-SCNC: 27 MMOL/L (ref 21–32)
CREAT SERPL-MCNC: 0.74 MG/DL (ref 0.6–1.3)
GLUCOSE BLD STRIP.AUTO-MCNC: 210 MG/DL (ref 70–110)
GLUCOSE BLD STRIP.AUTO-MCNC: 267 MG/DL (ref 70–110)
GLUCOSE SERPL-MCNC: 215 MG/DL (ref 74–99)
POTASSIUM SERPL-SCNC: 4.4 MMOL/L (ref 3.5–5.5)
SODIUM SERPL-SCNC: 140 MMOL/L (ref 136–145)

## 2020-03-11 PROCEDURE — 74011250636 HC RX REV CODE- 250/636: Performed by: HOSPITALIST

## 2020-03-11 PROCEDURE — 74011000250 HC RX REV CODE- 250: Performed by: HOSPITALIST

## 2020-03-11 PROCEDURE — 94640 AIRWAY INHALATION TREATMENT: CPT

## 2020-03-11 PROCEDURE — 80048 BASIC METABOLIC PNL TOTAL CA: CPT

## 2020-03-11 PROCEDURE — 74011636637 HC RX REV CODE- 636/637: Performed by: HOSPITALIST

## 2020-03-11 PROCEDURE — 94761 N-INVAS EAR/PLS OXIMETRY MLT: CPT

## 2020-03-11 PROCEDURE — 74011250637 HC RX REV CODE- 250/637: Performed by: HOSPITALIST

## 2020-03-11 PROCEDURE — 82962 GLUCOSE BLOOD TEST: CPT

## 2020-03-11 PROCEDURE — 36415 COLL VENOUS BLD VENIPUNCTURE: CPT

## 2020-03-11 RX ORDER — BUMETANIDE 0.5 MG/1
0.5 TABLET ORAL DAILY
Qty: 30 TAB | Refills: 0 | Status: SHIPPED | OUTPATIENT
Start: 2020-03-11 | End: 2020-03-16 | Stop reason: SDUPTHER

## 2020-03-11 RX ORDER — INSULIN GLARGINE 100 [IU]/ML
25 INJECTION, SOLUTION SUBCUTANEOUS DAILY
Status: DISCONTINUED | OUTPATIENT
Start: 2020-03-12 | End: 2020-03-11 | Stop reason: HOSPADM

## 2020-03-11 RX ORDER — INSULIN GLARGINE 100 [IU]/ML
5 INJECTION, SOLUTION SUBCUTANEOUS ONCE
Status: COMPLETED | OUTPATIENT
Start: 2020-03-11 | End: 2020-03-11

## 2020-03-11 RX ORDER — FUROSEMIDE 10 MG/ML
60 INJECTION INTRAMUSCULAR; INTRAVENOUS 2 TIMES DAILY
Status: DISCONTINUED | OUTPATIENT
Start: 2020-03-11 | End: 2020-03-11 | Stop reason: HOSPADM

## 2020-03-11 RX ADMIN — INSULIN GLARGINE 5 UNITS: 100 INJECTION, SOLUTION SUBCUTANEOUS at 10:30

## 2020-03-11 RX ADMIN — INSULIN LISPRO 3 UNITS: 100 INJECTION, SOLUTION INTRAVENOUS; SUBCUTANEOUS at 08:00

## 2020-03-11 RX ADMIN — VALSARTAN 40 MG: 40 TABLET, FILM COATED ORAL at 09:15

## 2020-03-11 RX ADMIN — INSULIN LISPRO 3 UNITS: 100 INJECTION, SOLUTION INTRAVENOUS; SUBCUTANEOUS at 12:24

## 2020-03-11 RX ADMIN — SPIRONOLACTONE: 25 TABLET ORAL at 09:16

## 2020-03-11 RX ADMIN — CLOPIDOGREL BISULFATE 75 MG: 75 TABLET ORAL at 09:15

## 2020-03-11 RX ADMIN — INSULIN GLARGINE 20 UNITS: 100 INJECTION, SOLUTION SUBCUTANEOUS at 09:08

## 2020-03-11 RX ADMIN — FUROSEMIDE 60 MG: 10 INJECTION, SOLUTION INTRAMUSCULAR; INTRAVENOUS at 09:17

## 2020-03-11 RX ADMIN — CARVEDILOL 6.25 MG: 6.25 TABLET, FILM COATED ORAL at 08:00

## 2020-03-11 RX ADMIN — INSULIN LISPRO 6 UNITS: 100 INJECTION, SOLUTION INTRAVENOUS; SUBCUTANEOUS at 12:25

## 2020-03-11 RX ADMIN — ATORVASTATIN CALCIUM 40 MG: 20 TABLET, FILM COATED ORAL at 09:15

## 2020-03-11 RX ADMIN — INSULIN LISPRO 9 UNITS: 100 INJECTION, SOLUTION INTRAVENOUS; SUBCUTANEOUS at 08:01

## 2020-03-11 RX ADMIN — ASPIRIN 81 MG 81 MG: 81 TABLET ORAL at 09:15

## 2020-03-11 RX ADMIN — BUDESONIDE 500 MCG: 0.5 SUSPENSION RESPIRATORY (INHALATION) at 09:14

## 2020-03-11 NOTE — DISCHARGE SUMMARY
Discharge Summary    Patient: Marie Brown               Sex: female          DOA: 3/9/2020         YOB: 1955      Age:  75 y.o.        LOS:  LOS: 2 days                Admit Date: 3/9/2020    Discharge Date: 3/11/2020    Discharge Medications:     Current Discharge Medication List      START taking these medications    Details   bumetanide (BUMEX) 0.5 mg tablet Take 1 Tab by mouth daily. Qty: 30 Tab, Refills: 0         CONTINUE these medications which have NOT CHANGED    Details   valsartan (DIOVAN) 40 mg tablet Take 1 Tab by mouth daily. Qty: 30 Tab, Refills: 6      spironolactone (ALDACTONE) 25 mg tablet Take 0.5 Tabs by mouth daily. Qty: 30 Tab, Refills: 6      carvediloL (COREG) 6.25 mg tablet Take 1 Tab by mouth two (2) times daily (with meals). Qty: 60 Tab, Refills: 6      Blood Pressure Test Kit-Large kit Take blood pressure as needed  Qty: 1 Kit, Refills: 0      atorvastatin (LIPITOR) 20 mg tablet Take 2 Tabs by mouth daily. Qty: 30 Tab, Refills: 0      aspirin 81 mg chewable tablet Take 1 Tab by mouth daily. Qty: 30 Tab, Refills: 0      insulin glargine (LANTUS U-100 INSULIN) 100 unit/mL injection 20 Units by SubCUTAneous route daily. Indications: type 2 diabetes mellitus      inhalational spacing device 1 Each by Does Not Apply route as needed. Qty: 1 Device, Refills: 0      insulin lispro protamin/lispro (HUMALOG MIX 75-25 KWIKPEN SC) by SubCUTAneous route Before breakfast, lunch, and dinner. Pt reported sliding scale      clopidogreL (PLAVIX) 75 mg tab Take 1 Tab by mouth daily. Qty: 30 Tab, Refills: 5      OTHER       budesonide (PULMICORT) 0.25 mg/2 mL nbsp by Nebulization route.              Follow-up: PCP, cardiology    Discharge Condition: Stable    Activity: Activity as tolerated    Diet: Cardiac Diet    Labs:  Labs: Results:       Chemistry Recent Labs     03/11/20  0730 03/10/20  0350 03/09/20  1616   * 184* 227*    139 139   K 4.4 3.4* 4.2    105 108 CO2 27 27 27   BUN 19* 12 14   CREA 0.74 0.66 0.64   CA 8.8 9.1 8.9   AGAP 6 7 4   BUCR 26* 18 22*   AP  --   --  147*   TP  --   --  7.1   ALB  --   --  2.9*   GLOB  --   --  4.2*   AGRAT  --   --  0.7*      CBC w/Diff Recent Labs     03/10/20  0350 03/09/20  1616   WBC 7.7 7.8   RBC 3.88* 3.90*   HGB 10.5* 10.6*   HCT 32.9* 33.0*    267   GRANS 69 74*   LYMPH 16* 16*   EOS 4 4      Cardiac Enzymes Recent Labs     03/10/20  1041 03/10/20  0350   CPK 53 55   CKND1 CALCULATION NOT PERFORMED WHEN RESULT IS BELOW LINEAR LIMIT 2.7      Coagulation Recent Labs     03/09/20  1616   PTP 13.2   INR 1.0   APTT 28.5       Lipid Panel Lab Results   Component Value Date/Time    Cholesterol, total 128 12/19/2019 07:27 AM    HDL Cholesterol 51 12/19/2019 07:27 AM    LDL, calculated 54.4 12/19/2019 07:27 AM    VLDL, calculated 22.6 12/19/2019 07:27 AM    Triglyceride 113 12/19/2019 07:27 AM    CHOL/HDL Ratio 2.5 12/19/2019 07:27 AM      BNP No results for input(s): BNPP in the last 72 hours. Liver Enzymes Recent Labs     03/09/20  1616   TP 7.1   ALB 2.9*   *   SGOT 15      Thyroid Studies Lab Results   Component Value Date/Time    TSH 2.05 11/26/2019 04:05 PM          Imaging:    Ct Chest W Cont    Result Date: 2/24/2020  EXAM: CT chest INDICATION: Malignant neoplasm of lower lobe of right lung. COMPARISON: CTA chest on 11/26/2019, CT chest on 5/56/9898 TECHNIQUE: Helical volumetric scanning from the thoracic inlet through the diaphragm with nonionic intravenous contrast. Axial, sagittal and coronal reconstructions were generated. One or more dose reduction techniques were used on this CT: automated exposure control, adjustment of the mAs and/or kVp according to patient size, and iterative reconstruction techniques. The specific techniques used on this CT exam have been documented in the patient's electronic medical record.   Digital Imaging and Communications in Medicine (DICOM) format image data are available to nonaffiliated external healthcare facilities or entities on a secure, media free, reciprocally searchable basis with patient authorization for at least a 12-month period after this study. _______________ FINDINGS: LUNGS: Consolidation with air bronchograms is redemonstrated in the medial aspect suprahilar right upper lobe extending to the hilus, inferiorly into the medial right middle lobe and to the posterior aspect of the right hilus within the lower lobe extending caudally into the basal segments of the right lower lobe. While a definite underlying mass is not seen with differing density, a break in the right infrahilar air bronchogram pattern measures about 1.9 mm and further inferiorly in the periphery of the right lower lobe, a break in the air bronchogram pattern measures 2.7 x 1.4 cm, possibly corresponding with neoplasm on the oldest prior study. The degree of volume loss of the right lower lobe has improved slightly since the previous CTA. Pleural-based nodules in the periphery of the right middle lobe are stable since the oldest prior study, at 8 and 9 mm each (axial image 119, 121). Several 2-3 mm left lung nodules are also stable (53, 91, 117, 148). A couple intrapulmonary lymph nodes are redemonstrated within the left major fissure (81, 71). No new or increasing pulmonary nodules. PLEURA/CHEST WALL: Interval improvement of right pleural effusion since the CT, not quite small. AIRWAY: No central airway filling. The bronchus intermedius and right lower lobe bronchus are narrowed, as previously seen. MEDIASTINUM: Mild cardiomegaly. Minimal amount of pericardial fluid. Calcific atherosclerosis is fairly prominent involving the coronary arteries. LYMPH NODES: No enlarged lymph nodes. UPPER ABDOMEN: Small hiatal hernia is present. Tiny lateral left renal low densities probably a benign cyst. OTHER: Old fractures of the right seventh and eighth ribs posteriorly are again noted.  No suspicious osseous lesions. _______________     IMPRESSION: 1. Radiation pneumonitis/fibrosis and cicatrizing atelectasis in the central right lung involving right upper lobe, right middle lobe and more prominently right lower lobe. While masses of differing density are not definitively seen amongst of this process, focal lack in the air bronchograms as measured above in the right infrahilar region and posterior right lower lobe raises the possibility of neoplasm. 2. Stable bilateral lung nodules are 20 months. Recommend continued follow-up per oncology protocol. 3. Small right pleural effusion, improved since the most recent prior study. Cta Chest W Or W Wo Cont    Result Date: 3/10/2020  EXAM: CTA CHEST W OR W WO CONT CLINICAL INDICATION/HISTORY: Progressively worsening shortness of breath, dyspnea COMPARISON: Chest radiograph same day; chest CT 2/24/2020 TECHNIQUE: Thin section CT was performed through the chest during pulmonary arterial enhancement. MIP 3D reconstructions were generated to increase sensitivity in the detection of pulmonary emboli. One or more dose reduction techniques were used on this CT: automated exposure control, adjustment of the mAs and/or kVp according to patient size, and iterative reconstruction techniques. The specific techniques used on this CT exam have been documented in the patient's electronic medical record. Digital Imaging and Communications in Medicine (DICOM) format image data are available to nonaffiliated external healthcare facilities or entities on a secure, media free, reciprocally searchable basis with patient authorization for at least a 12-month period after this study. --- EXAM QUALITY --- 1. Overall exam quality- satisfactory: adequate 2. Adequacy of pulmonary enhancement-adequate: sufficient enhancement for diagnosis down to and including subsegmental vessels. Main PA density 190-250 HU 3. Adequacy of breath hold- adequate: sufficient enough to render diagnosis 4.   There are no significant noise, beam hardening, streak artifacts affecting scan quality. _______________ FINDINGS: ---  PULMONARY CT ANGIOGRAM  --- PULMONARY VASCULATURE: The right and left central, primary segmental and subsegmental pulmonary arteries appear patent. There is no convincing evidence of intraluminal filling defect identified to suggest pulmonary embolism. THORACIC AORTA: Normal caliber thoracic aorta. No aortic aneurysm, intramural hematoma or dissection. ---  CT CHEST --- LUNG PARENCHYMA:   Groundglass opacity is seen throughout posterior and dependent distributions of the left lung. There is left lower lobe basilar segment atelectasis adjacent to the small left pleural effusion. More dense consolidation and volume loss are seen throughout the majority of the right lower and right middle lobes. There is also groundglass opacity throughout perihilar and dependent portions of the right upper lobe. Right perihilar air bronchograms appears similar to prior study. PLEURAL SPACES:   Tiny left and small right pleural effusions. No pneumothorax. MEDIASTINUM:   There is inferior reflux of contrast into the hepatic cava. Global cardiomegaly with scant pericardial fluid. Tortuous, normal caliber thoracic aorta. Mild central pulmonary vascular congestion. Main pulmonary artery is normal in caliber. Right jugular Mediport with tip in the region of the cavoatrial junction. Thyroid and thoracic esophagus appear within normal limits. Small hiatal hernia. OSSEOUS STRUCTURES: Unchanged posterior right seventh and eighth rib fractures. No acute osseous abnormality. Moderate thoracic degenerative disk disease. UPPER ABDOMEN: No acute abnormality. _______________     IMPRESSION: 1. No evidence of pulmonary thromboembolic disease.   2. Global cardiomegaly with central vascular congestion, reflux of contrast inferiorly into the hepatic cava, dependent groundglass opacity, and right greater than left pleural effusions, suggestive of volume overload. 3. Right perihilar opacity with central air bronchograms; indeterminate to what extent these findings represent residual tumor versus treatment-related findings given obscuration related to acute pulmonary edema. Initial preliminary report was provided to the ED by the on-call radiology resident. Xr Chest Port    Result Date: 3/9/2020  EXAM: XR CHEST PORT CLINICAL INDICATION/HISTORY: SOB -Additional: Malignant neoplasm of the right lower lobe. COMPARISON: CT chest performed 2/24/2020 TECHNIQUE: Frontal view of the chest _______________ FINDINGS: HEART AND MEDIASTINUM: Right IJ approach Mediport catheter with tip projecting in stable position. Cardiac size is mildly enlarged. Mediastinal contours are stable. LUNGS AND PLEURAL SPACES: There is volume loss in the right hemithorax again noted with paramediastinal right lower lung opacity noted. Small pleural effusion. No pneumothorax. Left lung clear as visualized. BONY THORAX AND SOFT TISSUES: No acute osseous abnormality _______________     IMPRESSION: 1. Right IJ approach Mediport catheter in expected/stable position. 2. Volume loss in the right hemithorax with accompanying paramediastinal parenchymal density likely in keeping with combination of atelectasis and posttreatment fibrosis as seen on recent prior chest CT. Small right pleural effusion. Consults: None    Treatment Team: Treatment Team: Attending Provider: Zoraida Lawrence MD; Primary Nurse: Sary Claire;  Consulting Provider: Zoraida Lawrence MD; Utilization Review: Efrem Evangelista RN; Charge Nurse: Ashley Sanchez RN; Primary Nurse: Ghassan Cannon    Significant Diagnostic Studies: labs:   Recent Results (from the past 24 hour(s))   GLUCOSE, POC    Collection Time: 03/10/20 11:56 AM   Result Value Ref Range    Glucose (POC) 354 (H) 70 - 110 mg/dL   GLUCOSE, POC    Collection Time: 03/10/20  5:24 PM   Result Value Ref Range    Glucose (POC) 211 (H) 70 - 110 mg/dL   GLUCOSE, POC    Collection Time: 03/10/20  9:56 PM   Result Value Ref Range    Glucose (POC) 321 (H) 70 - 318 mg/dL   METABOLIC PANEL, BASIC    Collection Time: 03/11/20  7:30 AM   Result Value Ref Range    Sodium 140 136 - 145 mmol/L    Potassium 4.4 3.5 - 5.5 mmol/L    Chloride 107 100 - 111 mmol/L    CO2 27 21 - 32 mmol/L    Anion gap 6 3.0 - 18 mmol/L    Glucose 215 (H) 74 - 99 mg/dL    BUN 19 (H) 7.0 - 18 MG/DL    Creatinine 0.74 0.6 - 1.3 MG/DL    BUN/Creatinine ratio 26 (H) 12 - 20      GFR est AA >60 >60 ml/min/1.73m2    GFR est non-AA >60 >60 ml/min/1.73m2    Calcium 8.8 8.5 - 10.1 MG/DL   GLUCOSE, POC    Collection Time: 03/11/20  7:54 AM   Result Value Ref Range    Glucose (POC) 267 (H) 70 - 110 mg/dL   GLUCOSE, POC    Collection Time: 03/11/20 11:28 AM   Result Value Ref Range    Glucose (POC) 210 (H) 70 - 110 mg/dL         Discharge diagnoses:    Problem List as of 3/11/2020 Date Reviewed: 11/26/2019          Codes Class Noted - Resolved    * (Principal) CHF (congestive heart failure) ([facility] ICD-10-CM: I50.9  ICD-9-CM: 428.0  3/9/2020 - Present        Lung cancer ([facility] ICD-10-CM: C34.90  ICD-9-CM: 162.9  3/9/2020 - Present        Hyperlipidemia ICD-10-CM: E78.5  ICD-9-CM: 272.4  3/9/2020 - Present        Throat cancer ([facility] ICD-10-CM: C14.0  ICD-9-CM: 149.0  3/9/2020 - Present        CAD (coronary artery disease) ICD-10-CM: I25.10  ICD-9-CM: 414.00  12/18/2019 - Present        Pneumonitis ICD-10-CM: J18.9  ICD-9-CM: 436  11/26/2019 - Present    Overview Signed 11/26/2019  7:28 PM by Erica Border, DO     Post-Obstructive Pneumonia vs. Radiation Pneumonitis             Arterial occlusion ICD-10-CM: I70.90  ICD-9-CM: 444.9  11/26/2019 - Present    Overview Signed 11/26/2019  7:29 PM by Erica Border, DO     Right Lower Lobe Artery Occlusion and Right Inferior Pulmonary Vein Occlusion possibly due to compression by Lung Mass.              NSTEMI (non-ST elevated myocardial infarction) ([facility] ICD-10-CM: I21.4  ICD-9-CM: 410.70  11/26/2019 - Present        COPD (chronic obstructive pulmonary disease) ([facility] ICD-10-CM: J44.9  ICD-9-CM: 496  11/26/2019 - Present        Diabetes mellitus, type 2 ([facility] ICD-10-CM: E11.9  ICD-9-CM: 250.00  11/26/2019 - Present        HTN (hypertension) ICD-10-CM: I10  ICD-9-CM: 401.9  11/26/2019 - Present        Occlusion of artery ICD-10-CM: I70.90  ICD-9-CM: 444.9  11/26/2019 - Present        Occlusion of vein ICD-10-CM: I82.90  ICD-9-CM: 453.9  11/26/2019 - Present            Procedures:  none    Hospital Course:  Major issues addressed during hospitalization outlined  below. Nano Sanchez is a 75 y.o. female who presented to the ER with SOB and oxygen requirement. The patient has had oxygen at home in the past but has not required it in two months. She developed SOB at home and felt that she could not breath. She went and got her oxygen and it made her feel better. She felt that she could not leave the house without the oxygen. She did not have chest pain. She had coronary artery stent placed a couple of months ago. In the ER she is found to have fluid overload on CTA but no PE. She will be admitted for ongoing management. Patient was diuresed with IV lasix. Patient improved symptomatically, patient's I/o were inaccurate. Patient has 02 at home and can continue this at the rate determined by walk test that will be done before discharge. Patient to be discharged on po bumex as above.     Mel Brown MD  March 11, 2020        Total time spent 40 minutes

## 2020-03-11 NOTE — ROUTINE PROCESS
I have reviewed discharge instructions with the patient. The patient verbalized understanding.   Waiting on  for

## 2020-03-11 NOTE — DIABETES MGMT
Glycemic Control:  Recommend increasing Lantus to 25 units q 24 hrs with 5 units of Lantus now.  Kyree DUNHAM

## 2020-03-11 NOTE — PHYSICIAN ADVISORY
Letter of Admission Status Determination    Velasquez Marina   Age: 75 y.o. MRN: [id number]    Date of hospitalization: 3/9/2020    I have reviewed this case as it involves a patient admitted as Inpatient that does not meet payor's criteria for Inpatient admission. The patient is stable for discharge today. Therefore, I recommend changing the admission status to OBSERVATION. The final decision regarding the patient's hospitalization status depends on the attending physician's judgment.       Nicki Downey MD, PATEL, [address] [facility], [address] [place] [address]  [phone]

## 2020-03-11 NOTE — ANCILLARY DISCHARGE INSTRUCTIONS
Core Measures Patient, RRAT Score is 16, Patient has transitional care follow up with Dr. Zenia Brooks on 3/19/2020 at 11:30 am.

## 2020-03-11 NOTE — PROGRESS NOTES
0730.Bedside and Verbal shift change report given to this nurse Connie Alatorre (oncoming nurse) by Marcelo Ho (offgoing nurse). Report included the following information SBAR, Kardex and MAR.

## 2020-03-11 NOTE — ROUTINE PROCESS
Bedside and Verbal shift change report given to Myles Giraldo RN (oncoming nurse) by [place] [id number] (offgoing nurse). Report included the following information SBAR, Intake/Output, MAR and Recent Results. Orders for telemetry, FR 1200 mL added per Yoon Arias MD note. NSR. SSI changed to Very Resistant dosing due to >2 BG in excess of 200. Bedside and Verbal shift change report given to Mony RN (oncoming nurse) by Myles Giraldo RN (offgoing nurse). Report included the following information SBAR, Intake/Output, MAR, Recent Results and Cardiac Rhythm NSR.

## 2020-03-11 NOTE — ROUTINE PROCESS
Bedside and Verbal shift change report given to Robert Licona RN (oncoming nurse) by Joe Correa RN, BSN (offgoing nurse). Report given with SBAR, Kardex, Intake/Output, MAR and Recent Results.

## 2020-03-11 NOTE — PROGRESS NOTES
Spoke with pt, she REFUSES hh. States she already has home O2. States cannot recall name of provider. Called spouse with her permission, left message. Pt called \"[employer]\" he is transporting home, name of Provider for O2 is [employer] [facility] [employer], [phone]. they just delivered port O2 tanks to pt's home this am. [employer] will bring port tank when he picks her up.      Care Management Interventions  PCP Verified by CM: Patricia Galvez)  Transition of Care Consult (CM Consult): Discharge Planning  Current Support Network: Lives with Spouse  Confirm Follow Up Transport: Family  The Plan for Transition of Care is Related to the Following Treatment Goals : home  Discharge Location  Discharge Placement: Home

## 2020-03-11 NOTE — PROGRESS NOTES
Problem: Gas Exchange - Impaired  Goal: *Absence of hypoxia  Outcome: Progressing Towards Goal   Respiratory Therapy Assessment Care Plan    Patient:  Silke Christian 75 y.o. female 3/11/2020 9:20 AM    Lung cancer ([facility] [C34.90]  CHF (congestive heart failure) ([facility] [I50.9]      Chest X-RAY:   Results from Hospital Encounter encounter on 03/09/20   XR CHEST PORT    Impression IMPRESSION:      1. Right IJ approach Mediport catheter in expected/stable position. 2. Volume loss in the right hemithorax with accompanying paramediastinal  parenchymal density likely in keeping with combination of atelectasis and  posttreatment fibrosis as seen on recent prior chest CT. Small right pleural  effusion. Results from [place] encounter on 04/24/19   XR CHEST PORT    Impression IMPRESSION:    No significant interval change given differences in technique. Continued  ill-defined right hilar and lower lobe airspace process, which is nonspecific  but given history may represent sequela of prior right lung cancer with  radiation therapy. Superimposed pneumonia is possible, although no definite  change is seen. Results from [place] encounter on 12/30/18   XR CHEST PA LAT    Impression IMPRESSION:    1. Shallow inspiration with interval increase of right lower lobe airspace  process. Follow-up is recommended. Patient has known history of neoplasm with  possible radiation treatment.                   Vital Signs:   Visit Vitals  /53 (BP 1 Location: Left arm, BP Patient Position: At rest)   Pulse 89   Temp 99 °F (37.2 °C)   Resp 18   Ht 5' 7\" (1.702 m)   Wt 84.4 kg (186 lb)   SpO2 96%   Breastfeeding No   BMI 29.13 kg/m²         Indications for treatment: sob      Plan of care: PULMICORT bid        Goal: IMPROVE WOB

## 2020-03-11 NOTE — DISCHARGE INSTRUCTIONS
DISCHARGE SUMMARY from Nurse    PATIENT INSTRUCTIONS:    After general anesthesia or intravenous sedation, for 24 hours or while taking prescription Narcotics:  · Limit your activities  · Do not drive and operate hazardous machinery  · Do not make important personal or business decisions  · Do  not drink alcoholic beverages  · If you have not urinated within 8 hours after discharge, please contact your surgeon on call. Report the following to your surgeon:  · Excessive pain, swelling, redness or odor of or around the surgical area  · Temperature over 100.5  · Nausea and vomiting lasting longer than 4 hours or if unable to take medications  · Any signs of decreased circulation or nerve impairment to extremity: change in color, persistent  numbness, tingling, coldness or increase pain  · Any questions    What to do at Home:    *  Please give a list of your current medications to your Primary Care Provider. *  Please update this list whenever your medications are discontinued, doses are      changed, or new medications (including over-the-counter products) are added. *  Please carry medication information at all times in case of emergency situations. These are general instructions for a healthy lifestyle:    No smoking/ No tobacco products/ Avoid exposure to second hand smoke  Surgeon General's Warning:  Quitting smoking now greatly reduces serious risk to your health. Obesity, smoking, and sedentary lifestyle greatly increases your risk for illness    A healthy diet, regular physical exercise & weight monitoring are important for maintaining a healthy lifestyle    You may be retaining fluid if you have a history of heart failure or if you experience any of the following symptoms:  Weight gain of 3 pounds or more overnight or 5 pounds in a week, increased swelling in our hands or feet or shortness of breath while lying flat in bed.   Please call your doctor as soon as you notice any of these symptoms; do not wait until your next office visit. The discharge information has been reviewed with the patient. The patient verbalized understanding. Discharge medications reviewed with the patient and appropriate educational materials and side effects teaching were provided. ___________________________________________________________________________________________________________________________________    Your A1C  was   Lab Results   Component Value Date/Time    Hemoglobin A1c 10.9 (H) 03/10/2020 03:50 AM   .      This lab test reflects that your blood sugar was slightly elevated over the past 3 months and should be evaluated by your primary care provider. t.    This lab test reflects that your blood sugar averaged 267  over the past 3 months. It is important to follow up with your provider on a routine basis to continue to evaluate your blood sugar discuss any necessary changes in treatment.

## 2020-03-16 ENCOUNTER — OFFICE VISIT (OUTPATIENT)
Dept: CARDIOLOGY CLINIC | Age: 65
End: 2020-03-16

## 2020-03-16 VITALS
SYSTOLIC BLOOD PRESSURE: 122 MMHG | OXYGEN SATURATION: 97 % | BODY MASS INDEX: 29.13 KG/M2 | DIASTOLIC BLOOD PRESSURE: 77 MMHG | WEIGHT: 186 LBS | HEART RATE: 89 BPM

## 2020-03-16 DIAGNOSIS — I10 ESSENTIAL HYPERTENSION WITH GOAL BLOOD PRESSURE LESS THAN 140/90: ICD-10-CM

## 2020-03-16 DIAGNOSIS — I42.9 CARDIOMYOPATHY, UNSPECIFIED TYPE (HCC): Primary | ICD-10-CM

## 2020-03-16 DIAGNOSIS — I25.83 CORONARY ARTERY DISEASE DUE TO LIPID RICH PLAQUE: ICD-10-CM

## 2020-03-16 DIAGNOSIS — I25.10 CORONARY ARTERY DISEASE DUE TO LIPID RICH PLAQUE: ICD-10-CM

## 2020-03-16 DIAGNOSIS — E78.00 PURE HYPERCHOLESTEROLEMIA: ICD-10-CM

## 2020-03-16 RX ORDER — BUMETANIDE 0.5 MG/1
0.5 TABLET ORAL DAILY
Qty: 90 TAB | Refills: 5 | Status: SHIPPED | OUTPATIENT
Start: 2020-03-16 | End: 2020-04-17 | Stop reason: SDUPTHER

## 2020-03-16 NOTE — PROGRESS NOTES
1. Have you been to the ER, urgent care clinic since your last visit? Hospitalized since your last visit? yes    2. Have you seen or consulted any other health care providers outside of the [address] [place] [address] since your last visit? Include any pap smears or colon screening.   No

## 2020-03-16 NOTE — PROGRESS NOTES
[facility]    Ms. Anmol Cordoba is 75 y.o. female with a history of lung cancer, CAD, cardiomyopathy, COPD, diabetes, hypertension    Patient is here today for follow-up appointment. Patient was admitted to the hospital recently with CHF exacerbation. She said that 2 weeks prior to her presentation to the emergency department, she was slowly starting to feel short of breath however she was not aware of why. She went to hospital and was given IV diuretics. Since she has gone home she is feeling much better she denies edema or lower extremity swelling. She uses 2 pillows at night. She denies any chest pain or chest tightness to be concern of angina. Denies any nausea, vomiting, abdominal pain, fever, chills, sputum production. No hematuria or other bleeding complaints    Past Medical History:   Diagnosis Date    CAD (coronary artery disease)     S/P Mid LAD 2.5 X 23 mm XIENCE MAHAD (12/19)    Cardiomyopathy ([facility]     LVEF 25% (11/19)    COPD (chronic obstructive pulmonary disease) ([facility]     Diabetes mellitus (HCC)     Emphysema of lung (HCC)     Hypertension     Lung cancer ([facility]        Past Surgical History:   Procedure Laterality Date    ABDOMEN SURGERY PROC UNLISTED  1972    HX APPENDECTOMY      HX TONSILLECTOMY         Current Outpatient Medications   Medication Sig    bumetanide (BUMEX) 0.5 mg tablet Take 1 Tab by mouth daily.  valsartan (DIOVAN) 40 mg tablet Take 1 Tab by mouth daily.  spironolactone (ALDACTONE) 25 mg tablet Take 0.5 Tabs by mouth daily.  clopidogreL (PLAVIX) 75 mg tab Take 1 Tab by mouth daily.  carvediloL (COREG) 6.25 mg tablet Take 1 Tab by mouth two (2) times daily (with meals).  Blood Pressure Test Kit-Large kit Take blood pressure as needed    atorvastatin (LIPITOR) 20 mg tablet Take 2 Tabs by mouth daily.  (Patient taking differently: Take 20 mg by mouth daily.)    aspirin 81 mg chewable tablet Take 1 Tab by mouth daily.  insulin glargine (LANTUS U-100 INSULIN) 100 unit/mL injection 20 Units by SubCUTAneous route daily. Indications: type 2 diabetes mellitus    OTHER     budesonide (PULMICORT) 0.25 mg/2 mL nbsp by Nebulization route.  inhalational spacing device 1 Each by Does Not Apply route as needed.  insulin lispro protamin/lispro (HUMALOG MIX 75-25 KWIKPEN SC) by SubCUTAneous route Before breakfast, lunch, and dinner. Pt reported sliding scale     No current facility-administered medications for this visit. Allergies and Sensitivities:  Allergies   Allergen Reactions    Lisinopril Cough       Family History:  No family history on file. Social History:  Social History     Tobacco Use    Smoking status: Former Smoker     Years: 24.00     Last attempt to quit: 1978     Years since quittin.7    Smokeless tobacco: Never Used   Substance Use Topics    Alcohol use: No    Drug use: No     She  reports that she quit smoking about 41 years ago. She quit after 24.00 years of use. She has never used smokeless tobacco.  She  reports no history of alcohol use. Review of Systems:  Cardiac symptoms as noted above in HPI. All others negative. Denies fatigue, malaise, skin rash, joint pain, blurring vision, photophobia, neck pain, hemoptysis, chronic cough, nausea, vomiting, hematuria, burning micturition, BRBPR, chronic headaches. Physical Exam:  BP Readings from Last 3 Encounters:   20 122/77   20 115/53   20 147/78         Pulse Readings from Last 3 Encounters:   20 89   20 87   20 97          Wt Readings from Last 3 Encounters:   20 186 lb (84.4 kg)   03/10/20 186 lb (84.4 kg)   20 189 lb (85.7 kg)       Constitutional: Oriented to person, place, and time. HENT: Head: Normocephalic and atraumatic. Neck: No JVD present. Cardiovascular: Regular rhythm.    No murmur, gallop or rubs appreciated  Lung: Breath sounds normal. No respiratory distress. No ronchi or rales appreciated  Abdominal: No tenderness. No rebound and no guarding. Musculoskeletal: There is no lower extremity edema. No cynosis    Review of Data  LABS:   Lab Results   Component Value Date/Time    Sodium 140 03/11/2020 07:30 AM    Potassium 4.4 03/11/2020 07:30 AM    Chloride 107 03/11/2020 07:30 AM    CO2 27 03/11/2020 07:30 AM    Glucose 215 (H) 03/11/2020 07:30 AM    BUN 19 (H) 03/11/2020 07:30 AM    Creatinine 0.74 03/11/2020 07:30 AM     Lipids Latest Ref Rng & Units 12/19/2019 11/27/2019   Chol, Total <200 MG/ 206(H)   HDL 40 - 60 MG/DL 51 68(H)   LDL 0 - 100 MG/DL 54.4 109. 8(H)   Trig <150 MG/ 141   Chol/HDL Ratio 0 - 5.0   2.5 3.0   Some recent data might be hidden     Lab Results   Component Value Date/Time    ALT (SGPT) 14 03/09/2020 04:16 PM     Lab Results   Component Value Date/Time    Hemoglobin A1c 10.9 (H) 03/10/2020 03:50 AM       EKG    ECHO (12/19)  Left Ventricle Normal cavity size and wall thickness. Severe systolic dysfunction. The estimated ejection fraction is 26 - 30%. Visually measured ejection fraction. Global hypokinesis observed. There is severe (grade 3) left ventricular diastolic dysfunction. Elevated left ventricular diastolic pressure. Wall Scoring The left ventricular wall motion is globally hypokinetic. Left Atrium Mildly dilated left atrium. Left Atrium volume index is 40.14 mL/m2. Right Ventricle Mildly dilated right ventricle. Reduced systolic function. Right Atrium Normal cavity size. Aortic Valve Trileaflet valve structure and no stenosis. Mild aortic valve regurgitation. Mitral Valve No stenosis. Mitral annular dilatation. Moderate regurgitation. Tricuspid Valve No stenosis. Non-specific thickening. Mild tricuspid valve regurgitation. Pulmonary arterial systolic pressure is 90.8 mmHg. Mild pulmonary hypertension.      STRESS TEST (12/19)  · Baseline ECG: Normal EKG, non-specific ST-T wave abnormalities. · Gated SPECT: Left ventricular function post-stress was abnormal. Calculated ejection fraction is 30%. · Left ventricular perfusion is equivocal.  · There is small area of mildly intense reversible defect that involves distal anterior wall. This could represent mild ischemia or artifact. Clinical correlation is recommended    CATHETERIZATION (12/19)  Left Main   The vessel is large. The vessel is angiographically normal.   Left Anterior Descending   Mid 20-30% narrowing. Eccentric 70% tubular stenosis at D2 followed by eccentric 90% another tubular lesion at D3. Diagonal branches are small caliber vessels Status post PCI and stenting of mid LAD stenosis using 2.5 x 23 mm resolute MAHAD   Mid LAD lesion 85% stenosed. . Lesion is the culprit lesion. The lesion is serial. There is moderate plaque burden detected. Ramus Intermedius   Mid 50% tubular narrowing. Far distal 90-95% tubular stenosis. Left Circumflex   The vessel exhibits minimal luminal irregularities. OM: Angiographically normal. Inferior branch has 30-40% tubular lesion   Right Coronary Artery   Mid vessel 30-40% luminal irregularities. Posterolateral branch small caliber vessel with far distal long sequential 90-95% lesions (not suitable for PCI) RPDA without any obstructive disease     IMPRESSION & PLAN:  Ms. Banda Payment 75 y.o. female with multiple medical problem    Cardiomyopathy:  Patient has been diagnosed with severe LV dysfunction with EF 25% in December 2019. Last echo with EF of 30-35% in March 2020. Currently on Diovan, spironolactone, Coreg and Bumex. Unable to afford Entresto in past  No evidence of fluid overload on exam.  NYHA class II  EF has improved slightly. We will continue medical management and repeat echo in 3 months  Today I discussed with her the diagnosis of CHF and the management. Importance of salt and fluid restriction was discussed in detail today.   She verbalizes to understand    CAD  Patient had non-STEMI in December 2019 during hospitalization with elevated troponin. Nuclear stress test showed perfusion defect. Cardiac cath showed CAD and she had LAD stent in December 2019  Continue aspirin lifelong. Brilinta was switched to Plavix in February 2020 because it was expensive. Continue Plavix for a year  Continue beta-blocker and statin    Hypertension:  Blood pressure is normal continue current medications    Hyperlipidemia:  Started on atorvastatin 20 mg daily. Continue same. Last LDL 54    Diabetes:  Goal hemoglobin A1c less than 7 is recommended. Defer to PCP. Last A1c 10.1    Lung cancer:  Defer to primary team and heme-onc team.    This plan was discussed with patient who is in agreement. Thank you for allowing me to participate in patient care. Please feel free to call me if you have any question or concern. Elizabeth Land MD  Please note: This document has been produced using voice recognition software. Unrecognized errors in transcription may be present.

## 2020-04-14 ENCOUNTER — TELEPHONE (OUTPATIENT)
Dept: CARDIOLOGY CLINIC | Age: 65
End: 2020-04-14

## 2020-04-14 NOTE — TELEPHONE ENCOUNTER
Incoming from pt. Two patient Identifiers confirmed. Pt stated she has gained 5lbs in a month. Advised pt that the guidelines are 5 lbs within a week or 3 lbs within a day. Pt stated \" ok so I just been eating too much. \" Advised pt to try to exercise in the home by walking more in the home if she can and to stay safe. Pt verbalized understanding.

## 2020-04-17 RX ORDER — CARVEDILOL 6.25 MG/1
6.25 TABLET ORAL 2 TIMES DAILY WITH MEALS
Qty: 60 TAB | Refills: 6 | Status: SHIPPED | OUTPATIENT
Start: 2020-04-17 | End: 2020-10-29 | Stop reason: DRUGHIGH

## 2020-04-17 RX ORDER — BUMETANIDE 0.5 MG/1
0.5 TABLET ORAL DAILY
Qty: 90 TAB | Refills: 5 | Status: SHIPPED | OUTPATIENT
Start: 2020-04-17 | End: 2021-04-09

## 2020-04-17 RX ORDER — SPIRONOLACTONE 25 MG/1
12.5 TABLET ORAL DAILY
Qty: 90 TAB | Refills: 3 | Status: SHIPPED | OUTPATIENT
Start: 2020-04-17 | End: 2021-06-04

## 2020-04-17 RX ORDER — VALSARTAN 40 MG/1
40 TABLET ORAL DAILY
Qty: 90 TAB | Refills: 3 | Status: SHIPPED | OUTPATIENT
Start: 2020-04-17 | End: 2021-02-02

## 2020-04-22 RX ORDER — CLOPIDOGREL BISULFATE 75 MG/1
75 TABLET ORAL DAILY
Qty: 90 TAB | Refills: 3 | Status: SHIPPED | OUTPATIENT
Start: 2020-04-22 | End: 2021-02-08

## 2020-06-04 ENCOUNTER — DOCUMENTATION ONLY (OUTPATIENT)
Dept: CARDIOLOGY CLINIC | Age: 65
End: 2020-06-04

## 2020-06-04 NOTE — PROGRESS NOTES
Prior auth to hold plavix for 5 days received from [facility] for 6/9/20 EGD. Verbal order and read back per Efrain Stewart MD  Pt needs to stay on plavix due to having stints placed only 6 months ago unless EGD is urgent. Called [facility] and made aware. Called patient to make aware.

## 2020-06-12 ENCOUNTER — ANESTHESIA EVENT (OUTPATIENT)
Dept: ENDOSCOPY | Age: 65
End: 2020-06-12
Payer: MEDICARE

## 2020-06-15 ENCOUNTER — ANESTHESIA (OUTPATIENT)
Dept: ENDOSCOPY | Age: 65
End: 2020-06-15
Payer: MEDICARE

## 2020-06-15 ENCOUNTER — HOSPITAL ENCOUNTER (OUTPATIENT)
Age: 65
Setting detail: OUTPATIENT SURGERY
Discharge: HOME | End: 2020-06-15
Attending: INTERNAL MEDICINE | Admitting: INTERNAL MEDICINE
Payer: MEDICARE

## 2020-06-15 VITALS
SYSTOLIC BLOOD PRESSURE: 131 MMHG | OXYGEN SATURATION: 98 % | RESPIRATION RATE: 17 BRPM | BODY MASS INDEX: 30.92 KG/M2 | TEMPERATURE: 98 F | DIASTOLIC BLOOD PRESSURE: 73 MMHG | HEART RATE: 88 BPM | HEIGHT: 67 IN | WEIGHT: 197 LBS

## 2020-06-15 LAB
GLUCOSE BLD STRIP.AUTO-MCNC: 186 MG/DL (ref 70–110)
GLUCOSE BLD STRIP.AUTO-MCNC: 252 MG/DL (ref 70–110)

## 2020-06-15 PROCEDURE — 82962 GLUCOSE BLOOD TEST: CPT

## 2020-06-15 PROCEDURE — 74011250636 HC RX REV CODE- 250/636: Performed by: NURSE ANESTHETIST, CERTIFIED REGISTERED

## 2020-06-15 PROCEDURE — 77030037186 HC VLV ENDOSC STRL DEFENDO DISP MVAT -A: Performed by: INTERNAL MEDICINE

## 2020-06-15 PROCEDURE — 76040000019: Performed by: INTERNAL MEDICINE

## 2020-06-15 PROCEDURE — 76060000031 HC ANESTHESIA FIRST 0.5 HR: Performed by: INTERNAL MEDICINE

## 2020-06-15 PROCEDURE — 74011636637 HC RX REV CODE- 636/637: Performed by: NURSE ANESTHETIST, CERTIFIED REGISTERED

## 2020-06-15 RX ORDER — INSULIN LISPRO 100 [IU]/ML
INJECTION, SOLUTION INTRAVENOUS; SUBCUTANEOUS ONCE
Status: COMPLETED | OUTPATIENT
Start: 2020-06-15 | End: 2020-06-15

## 2020-06-15 RX ORDER — SODIUM CHLORIDE, SODIUM LACTATE, POTASSIUM CHLORIDE, CALCIUM CHLORIDE 600; 310; 30; 20 MG/100ML; MG/100ML; MG/100ML; MG/100ML
50 INJECTION, SOLUTION INTRAVENOUS CONTINUOUS
Status: DISCONTINUED | OUTPATIENT
Start: 2020-06-15 | End: 2020-06-15 | Stop reason: HOSPADM

## 2020-06-15 RX ORDER — PROPOFOL 10 MG/ML
INJECTION, EMULSION INTRAVENOUS AS NEEDED
Status: DISCONTINUED | OUTPATIENT
Start: 2020-06-15 | End: 2020-06-15 | Stop reason: HOSPADM

## 2020-06-15 RX ADMIN — PROPOFOL 20 MG: 10 INJECTION, EMULSION INTRAVENOUS at 11:35

## 2020-06-15 RX ADMIN — PROPOFOL 10 MG: 10 INJECTION, EMULSION INTRAVENOUS at 11:42

## 2020-06-15 RX ADMIN — PROPOFOL 20 MG: 10 INJECTION, EMULSION INTRAVENOUS at 11:36

## 2020-06-15 RX ADMIN — PROPOFOL 30 MG: 10 INJECTION, EMULSION INTRAVENOUS at 11:34

## 2020-06-15 RX ADMIN — PROPOFOL 50 MG: 10 INJECTION, EMULSION INTRAVENOUS at 11:32

## 2020-06-15 RX ADMIN — INSULIN LISPRO 9 UNITS: 100 INJECTION, SOLUTION INTRAVENOUS; SUBCUTANEOUS at 10:31

## 2020-06-15 RX ADMIN — SODIUM CHLORIDE, SODIUM LACTATE, POTASSIUM CHLORIDE, AND CALCIUM CHLORIDE 50 ML/HR: 600; 310; 30; 20 INJECTION, SOLUTION INTRAVENOUS at 10:36

## 2020-06-15 RX ADMIN — PROPOFOL 20 MG: 10 INJECTION, EMULSION INTRAVENOUS at 11:38

## 2020-06-15 RX ADMIN — PROPOFOL 20 MG: 10 INJECTION, EMULSION INTRAVENOUS at 11:40

## 2020-06-15 RX ADMIN — PROPOFOL 30 MG: 10 INJECTION, EMULSION INTRAVENOUS at 11:33

## 2020-06-15 NOTE — PERIOP NOTES
Patient arrived to Phase 2. Vitals signs monitoring initiated. Patient oriented to Person , Place, Time, Situation. Patient denies complaints of nausea and dizziness. Patient tolerated PO fluids. Patient ambulated from stretcher to chair with minimal assistance. IV removed. Patient dressed with home clothes. Reviewed discharge instructions. *Point  ([address] contact  given verbal instructions via phone due to COVID procedures. Patient transported to vehicle via wheel chair.

## 2020-06-15 NOTE — PROCEDURES
Endoscopy Procedure Note    Patient: Aamir Farah MRN: [id number]  SSN: [id number]    YOB: 1955  Age: 77 y.o. Sex: female      Date/Time:  6/15/2020 11:47 AM    Esophagogastroduodenoscopy (EGD) Procedure Note    Procedure: Esophagogastroduodenoscopy with esophageal dilation    IMPRESSION:   1. Esophageal ring at GE junction dilated with a 54 Fr Savary over a guidewire. 2. Sliding hiatus hernia. 3. Otherwise negative EGD. RECOMMENDATIONS:  1. -Continue acid suppression. 2. -Outpatient follow-up. 3. -Resume Clopidogrel today. Indication: Dysphagia/odynophagia  :  aMuricio Fatima MD  Referring Provider:   Robert Larry MD  History: The history and physical exam were reviewed and updated. Endoscope: [id number]  Extent of Exam: second portion of the duodenum  ASA: Per Anesthesia  Anethesia/Sedation:  MAC anesthesia    Description of the procedure: The procedure was discussed with the patient including risks, benefits, alternatives including risks of iv sedation, bleeding, perforation and aspiration. A safety timeout was performed. The patient was placed in the left lateral decubitus position. A bite block was placed. The patient was given incremental doses of intravenous sedation until moderate sedation was achieved. The patients vital signs were monitored at all times including heart rate/rhythm, blood pressure and oxygen saturation. The endoscope was then passed under direct visualization to the second portion of the duodenum. The endoscope was then slowly withdrawn while visualizing the mucosa. In the stomach a retroflexion was performed and gastric fundus and cardia visualized. The endoscope was then slowly withdrawn. The patient was then transferred to recovery in stable condition. Findings:    Esophagus: The esophageal mucosa without ulcers, erosions, inflammatory changes, changes of Ponce's esophagus, or mass lesions.  A ring was noted at the GE junction with a diameter of 16 mm. It was dilated with a 54 Fr Savary over a guidewire. Stomach: The gastric mucosa was without ulcers, erosions, vascular, or mass lesions. The gastric folds were normal. There was no retained food in the stomach. The pylorus was not deformed. Duodenum: The duodenum mucosa was without ulcers, erosions, inflammatory changes, vascular or mass lesions. Therapies:  esophageal dilation with savary size 54 Fr    Specimens: * No specimens in log *            Complications:   None; patient tolerated the procedure well.     EBL:Minimal    Discharge disposition:  Home in the company of  when able to ambulate    Hasmukh Fitzgerald MD  Ngozi 15, 2020  11:47 AM

## 2020-06-15 NOTE — ANESTHESIA POSTPROCEDURE EVALUATION
Procedure(s):  ESOPHAGOGASTRODUODENOSCOPY (EGD).     general, MAC    Anesthesia Post Evaluation      Multimodal analgesia: multimodal analgesia not used between 6 hours prior to anesthesia start to PACU discharge  Patient location during evaluation: bedside  Patient participation: complete - patient participated  Level of consciousness: awake  Pain score: 0  Pain management: adequate  Airway patency: patent  Anesthetic complications: no  Cardiovascular status: acceptable  Respiratory status: acceptable  Hydration status: acceptable  Post anesthesia nausea and vomiting:  none  Final Post Anesthesia Temperature Assessment:  Normothermia (36.0-37.5 degrees C)      INITIAL Post-op Vital signs:   Vitals Value Taken Time   /65 6/15/2020 11:47 AM   Temp 36.7 °C (98 °F) 6/15/2020 11:47 AM   Pulse 78 6/15/2020 11:47 AM   Resp 16 6/15/2020 11:47 AM   SpO2 100 % 6/15/2020 11:47 AM

## 2020-06-15 NOTE — PERIOP NOTES
Pre-Op Summary    Pt arrived via car with family/friend and is oriented to time, place, person and situation. Patient with steady gait with none assistive devices. Visit Vitals  /78 (BP 1 Location: Right arm, BP Patient Position: Sitting)   Pulse 96   Temp 99 °F (37.2 °C)   Resp 20   Ht 5' 7\" (1.702 m)   Wt 89.4 kg (197 lb)   SpO2 97%   BMI 30.85 kg/m²       Peripheral IV located on Right antecubital .    Patients belongings are located [facility]. Patient's point of contact is  Sophia Diehl  and their contact number is: [phone]. They will be leaving and coming back. They are able to receive medication information. They will be their ride home.

## 2020-06-15 NOTE — H&P
History and Physical    Nando Lanier  1955  [id number]  [id number]    Pre-Procedure Diagnosis:  R13.10 Dysphagia      Evaluation of past illnesses, surgeries, or injuries:   YES  Past Medical History:   Diagnosis Date    CAD (coronary artery disease)     S/P Mid LAD 2.5 X 23 mm XIENCE MAHAD ()    Cardiomyopathy ([facility]     LVEF 30% () 25% ()    [facility]     Diabetes mellitus (HCC)     Em[facility]ma [facility] lung ([facility]     Hypertension     Lung cancer ([facility]      Past Surgical History:   Procedure Laterality Date    ABDOMEN SURGERY PROC UNLISTED      HX APPENDECTOMY      HX TONSILLECTOMY         Allergies: Allergies   Allergen Reactions    Lisinopril Cough       Previous reactions to sedation/analgesia? NO    Review of current medications, supplement, herbals and nutraceuticals complete:  YES  Current Facility-Administered Medications   Medication Dose Route Frequency Provider Last Rate Last Dose    lactated Ringers infusion  50 mL/hr IntraVENous CONTINUOUS Stephanie Rubio CRNA 50 mL/hr at 06/15/20 1036 50 mL/hr at 06/15/20 1036          Pertinent labs reviewed? YES    History of substance abuse? NO  History reviewed. No pertinent family history.   Social History     Socioeconomic History    Marital status:      Spouse name: Not on file    Number of children: Not on file    Years of education: Not on file    Highest education level: Not on file   Occupational History    Not on file   Social Needs    Financial resource strain: Not on file    Food insecurity     Worry: Not on file     Inability: Not on file    Transportation needs     Medical: Not on file     Non-medical: Not on file   Tobacco Use    Smoking status: Former Smoker     Years: 24.00     Last attempt to quit: 1978     Years since quittin.9    Smokeless tobacco: Never Used   Substance and Sexual Activity    Alcohol use: No    Drug use: No    Sexual activity: Not on file   Lifestyle    Physical activity     Days per week: Not on file     Minutes per session: Not on file    Stress: Not on file   Relationships    Social connections     Talks on phone: Not on file     Gets together: Not on file     Attends Oriental orthodox service: Not on file     Active member of club or organization: Not on file     Attends meetings of clubs or organizations: Not on file     Relationship status: Not on file    Intimate partner violence     Fear of current or ex partner: Not on file     Emotionally abused: Not on file     Physically abused: Not on file     Forced sexual activity: Not on file   Other Topics Concern    Not on file   Social History Narrative    Not on file       Cardiac Status:  WNL    Mental Status:  WNL     Pulmonary Status:  WNL    NPO:  5-8    Assessment/Impression: Dysphagia.      Plan of treatment: MANSOOR Ortiz MD  6/15/2020  11:26 AM

## 2020-06-15 NOTE — ANESTHESIA PREPROCEDURE EVALUATION
Relevant Problems   No relevant active problems       Anesthetic History               Review of Systems / Medical History  Patient summary reviewed and nursing notes reviewed    Pulmonary    COPD               Neuro/Psych              Cardiovascular    Hypertension          CAD and cardiac stents         GI/Hepatic/Renal                Endo/Other    Diabetes         Other Findings   Comments: Diabetes mellitus ([facility] (E11.9)    Hypertension (I10)    COPD (chronic obstructive pulmonary disease) ([facility]) (J44.9)    Emphysema of lung ([facility]) (J43.9)    Cardiomyopathy ([facility] (I42.9)  LVEF 30% (03/20) 25% (11/19)  Lung cancer ([facility]) (C34.90)    CAD (coronary artery disease) (I25.10)  S/P Mid LAD 2.5 X 23 mm XIENCE MAHAD (12/19)             Physical Exam    Airway  Mallampati: III  TM Distance: < 4 cm    Mouth opening: Diminished (comment)     Cardiovascular    Rhythm: regular  Rate: normal         Dental    Dentition: Edentulous     Pulmonary                 Abdominal  Abdominal exam normal       Other Findings            Anesthetic Plan    ASA: 3  Anesthesia type: general and MAC          Induction: Intravenous  Anesthetic plan and risks discussed with: Patient

## 2020-06-15 NOTE — DISCHARGE INSTRUCTIONS
For the next 12 hours you should not:   1. drive   2. drink alcohol   3. operate any machinery   4. engage in activities that require mental sharpness or manual dexterity such as     cooking   5. take any drugs other than those prescribed by a physician   6. make any legal or financial decisions  Call your doctor's office immediately, if:   1. increased and continuing chest pain   2. Vomiting   3. increased abdominal pain    Take it easy today and resume normal activity tomorrow. Resume previous diet. RESUME PLAVIX. DISCHARGE SUMMARY from Nurse    PATIENT INSTRUCTIONS:    After general anesthesia or intravenous sedation, for 24 hours or while taking prescription Narcotics:  · Limit your activities  · Do not drive and operate hazardous machinery  · Do not make important personal or business decisions  · Do  not drink alcoholic beverages  · If you have not urinated within 8 hours after discharge, please contact your surgeon on call. Report the following to your surgeon:  · Excessive pain, swelling, redness or odor of or around the surgical area  · Temperature over 100.5  · Nausea and vomiting lasting longer than 4 hours or if unable to take medications  · Any signs of decreased circulation or nerve impairment to extremity: change in color, persistent  numbness, tingling, coldness or increase pain  · Any questions        These are general instructions for a healthy lifestyle:    No smoking/ No tobacco products/ Avoid exposure to second hand smoke  Surgeon General's Warning:  Quitting smoking now greatly reduces serious risk to your health.     Obesity, smoking, and sedentary lifestyle greatly increases your risk for illness    A healthy diet, regular physical exercise & weight monitoring are important for maintaining a healthy lifestyle    You may be retaining fluid if you have a history of heart failure or if you experience any of the following symptoms:  Weight gain of 3 pounds or more overnight or 5 pounds in a week, increased swelling in our hands or feet or shortness of breath while lying flat in bed. Please call your doctor as soon as you notice any of these symptoms; do not wait until your next office visit. The discharge information has been reviewed with the patient. The patient verbalized understanding. Discharge medications reviewed with the patient and appropriate educational materials and side effects teaching were provided. ___________________________________________________________________________________________________________________________________     Patient armband removed and given to patient to take home. Patient was informed of the privacy risks if armband lost or stolen    Patient Education        Learning About Coronavirus [phone])  Coronavirus [phone]): Overview  What is coronavirus (COVID-19)? The coronavirus disease (COVID-19) is caused by a virus. It is an illness that was first found in [place], [place], in December 2019. It has since spread worldwide. The virus can cause fever, cough, and trouble breathing. In severe cases, it can cause pneumonia and make it hard to breathe without help. It can cause death. Coronaviruses are a large group of viruses. They cause the common cold. They also cause more serious illnesses like Middle East respiratory syndrome (MERS) and severe acute respiratory syndrome (SARS). COVID-19 is caused by a novel coronavirus. That means it's a new type that has not been seen in people before. This virus spreads person-to-person through droplets from coughing and sneezing. It can also spread when you are close to someone who is infected. And it can spread when you touch something that has the virus on it, such as a doorknob or a tabletop. What can you do to protect yourself from coronavirus (COVID-19)? The best way to protect yourself from getting sick is to:  · Avoid areas where there is an outbreak.   · Avoid contact with people who may be infected. · Wash your hands often with soap or alcohol-based hand sanitizers. · Avoid crowds and try to stay at least 6 feet away from other people. · Wash your hands often, especially after you cough or sneeze. Use soap and water, and scrub for at least 20 seconds. If soap and water aren't available, use an alcohol-based hand . · Avoid touching your mouth, nose, and eyes. What can you do to avoid spreading the virus to others? To help avoid spreading the virus to others:  · Cover your mouth with a tissue when you cough or sneeze. Then throw the tissue in the trash. · Use a disinfectant to clean things that you touch often. · Wear a cloth face cover if you have to go to public areas. · Stay home if you are sick or have been exposed to the virus. Don't go to school, work, or public areas. And don't use public transportation, ride-shares, or taxis unless you have no choice. · If you are sick:  ? Leave your home only if you need to get medical care. But call the doctor's office first so they know you're coming. And wear a face cover. ? Wear the face cover whenever you're around other people. It can help stop the spread of the virus when you cough or sneeze. ? Clean and disinfect your home every day. Use household  and disinfectant wipes or sprays. Take special care to clean things that you grab with your hands. These include doorknobs, remote controls, phones, and handles on your refrigerator and microwave. And don't forget countertops, tabletops, bathrooms, and computer keyboards. When to call for help  [id number] anytime you think you may need emergency care. For example, call if:  · You have severe trouble breathing. (You can't talk at all.)  · You have constant chest pain or pressure. · You are severely dizzy or lightheaded. · You are confused or can't think clearly. · Your face and lips have a blue color.   · You pass out (lose consciousness) or are very hard to wake up.  Call your doctor now if you develop symptoms such as:  · Shortness of breath. · Fever. · Cough. If you need to get care, call ahead to the doctor's office for instructions before you go. Make sure you wear a face cover to prevent exposing other people to the virus. Where can you get the latest information? The following health organizations are tracking and studying this virus. Their websites contain the most up-to-date information. [place] also learn what to do if you think you may have been exposed to the virus. · U.S. Centers for Disease Control and Prevention (CDC): The CDC provides updated news about the disease and travel advice. The website also tells you how to prevent the spread of infection. www.cdc.gov  · World Health Organization [facility]): WHO offers information about the virus outbreaks. WHO also has travel advice. www.who.int  Current as of: May 8, 2020               Content Version: 12.5  © 4352-9261 Healthwise, Incorporated. Care instructions adapted under license by [employer] (which disclaims liability or warranty for this information). If you have questions about a medical condition or this instruction, always ask your healthcare professional. [address] any warranty or liability for your use of this information.

## 2020-06-18 ENCOUNTER — OFFICE VISIT (OUTPATIENT)
Dept: CARDIOLOGY CLINIC | Age: 65
End: 2020-06-18

## 2020-06-18 VITALS
OXYGEN SATURATION: 97 % | RESPIRATION RATE: 18 BRPM | DIASTOLIC BLOOD PRESSURE: 77 MMHG | BODY MASS INDEX: 30.86 KG/M2 | HEART RATE: 98 BPM | TEMPERATURE: 98 F | WEIGHT: 196.6 LBS | SYSTOLIC BLOOD PRESSURE: 128 MMHG | HEIGHT: 67 IN

## 2020-06-18 DIAGNOSIS — I50.9 CONGESTIVE HEART FAILURE, UNSPECIFIED HF CHRONICITY, UNSPECIFIED HEART FAILURE TYPE (HCC): Primary | ICD-10-CM

## 2020-06-18 DIAGNOSIS — R06.09 DOE (DYSPNEA ON EXERTION): ICD-10-CM

## 2020-06-18 NOTE — PROGRESS NOTES
Migdalia Johnston presents today for   Chief Complaint   Patient presents with    Follow-up       Migdalia Boonespan preferred language for health care discussion is english/other. Is someone accompanying this pt? n    Is the patient using any DME equipment during [address]? n    Depression Screening:  3 most recent PHQ Screens 3/16/2020   Little interest or pleasure in doing things Not at all   Feeling down, depressed, irritable, or hopeless Not at all   Total Score PHQ 2 0       Learning Assessment:  No flowsheet data found. Abuse Screening:  No flowsheet data found. Fall Risk  Fall Risk Assessment, last 12 mths 3/16/2020   Able to walk? Yes   Fall in past 12 months? No       Pt currently taking Anticoagulant therapy? y    Coordination of Care:  1. Have you been to the ER, urgent care clinic since your last visit? Hospitalized since your last visit? n    2. Have you seen or consulted any other health care providers outside of the [address] [place] [address] since your last visit? Include any pap smears or colon screening.  n

## 2020-06-18 NOTE — PATIENT INSTRUCTIONS
Echo-  Please call central scheduling at [phone]      All testing/lab work is completed at [facility] [address], [facility]

## 2020-06-18 NOTE — PROGRESS NOTES
[facility]    Ms. Maegan Worthy is 77 y.o. female with a history of lung cancer, CAD, cardiomyopathy, COPD, diabetes, hypertension    Patient is here today for follow-up appointment. Patient underwent EGD recently and had a esophageal dilation for the esophageal ring. This was done because of dysphagia. She is feeling better. She is able to eat without any problem. She denies any chest pain or chest tightness. She denies any PND. She denies any lower extremity swelling. She is taking all her medications regularly. Overall she has no specific cardiac complaint. Denies any nausea, vomiting, abdominal pain, fever, chills, sputum production. No hematuria or other bleeding complaints    Past Medical History:   Diagnosis Date    CAD (coronary artery disease)     S/P Mid LAD 2.5 X 23 mm XIENCE MAHAD (12/19)    Cardiomyopathy [facility]     LVEF 30% (03/20) 25% (11/19)    [facility]ronic obstructive pulmonary disease) [facility]     Diabetes mellitus (HCC)     Emphysema of lung ([facility]     Hypertension     [facility]        Past Surgical History:   Procedure Laterality Date    ABDOMEN SURGERY PROC UNLISTED  1972    HX APPENDECTOMY      HX TONSILLECTOMY         Current Outpatient Medications   Medication Sig    clopidogreL (Plavix) 75 mg tab Take 1 Tab by mouth daily.  spironolactone (Aldactone) 25 mg tablet Take 0.5 Tabs by mouth daily.  valsartan (DIOVAN) 40 mg tablet Take 1 Tab by mouth daily.  bumetanide (BUMEX) 0.5 mg tablet Take 1 Tab by mouth daily.  carvediloL (COREG) 6.25 mg tablet Take 1 Tab by mouth two (2) times daily (with meals).  Blood Pressure Test Kit-Large kit Take blood pressure as needed    atorvastatin (LIPITOR) 20 mg tablet Take 2 Tabs by mouth daily. (Patient taking differently: Take 20 mg by mouth daily.)    aspirin 81 mg chewable tablet Take 1 Tab by mouth daily.     insulin glargine (LANTUS U-100 INSULIN) 100 unit/mL injection 20 Units by SubCUTAneous route daily. Indications: type 2 diabetes mellitus    OTHER     budesonide (PULMICORT) 0.25 mg/2 mL nbsp by Nebulization route.  inhalational spacing device 1 Each by Does Not Apply route as needed.  insulin lispro protamin/lispro (HUMALOG MIX 75-25 KWIKPEN SC) by SubCUTAneous route Before breakfast, lunch, and dinner. Pt reported sliding scale     No current facility-administered medications for this visit. Allergies and Sensitivities:  Allergies   Allergen Reactions    Lisinopril Cough       Family History:  No family history on file. Social History:  Social History     Tobacco Use    Smoking status: Former Smoker     Years: 24.00     Last attempt to quit: 1978     Years since quittin.9    Smokeless tobacco: Never Used   Substance Use Topics    Alcohol use: No    Drug use: No     She  reports that she quit smoking about 41 years ago. She quit after 24.00 years of use. She has never used smokeless tobacco.  She  reports no history of alcohol use. Review of Systems:  Cardiac symptoms as noted above in HPI. All others negative. Denies fatigue, malaise, skin rash, joint pain, blurring vision, photophobia, neck pain, hemoptysis, chronic cough, nausea, vomiting, hematuria, burning micturition, BRBPR, chronic headaches. Physical Exam:  BP Readings from Last 3 Encounters:   06/15/20 131/73   20 122/77   20 115/53         Pulse Readings from Last 3 Encounters:   06/15/20 88   20 89   20 87          Wt Readings from Last 3 Encounters:   06/15/20 197 lb (89.4 kg)   20 186 lb (84.4 kg)   03/10/20 186 lb (84.4 kg)       Constitutional: Oriented to person, place, and time. HENT: Head: Normocephalic and atraumatic. Neck: No JVD present. Cardiovascular: Regular rhythm. No murmur, gallop or rubs appreciated  Lung: Breath sounds normal. No respiratory distress.  No ronchi or rales appreciated  Abdominal: No tenderness. No rebound and no guarding. Musculoskeletal: There is no lower extremity edema. No cynosis    Review of Data  LABS:   Lab Results   Component Value Date/Time    Sodium 140 03/11/2020 07:30 AM    Potassium 4.4 03/11/2020 07:30 AM    Chloride 107 03/11/2020 07:30 AM    CO2 27 03/11/2020 07:30 AM    Glucose 215 (H) 03/11/2020 07:30 AM    BUN 19 (H) 03/11/2020 07:30 AM    Creatinine 0.74 03/11/2020 07:30 AM     Lipids Latest Ref Rng & Units 12/19/2019 11/27/2019   Chol, Total <200 MG/ 206(H)   HDL 40 - 60 MG/DL 51 68(H)   LDL 0 - 100 MG/DL 54.4 109. 8(H)   Trig <150 MG/ 141   Chol/HDL Ratio 0 - 5.0   2.5 3.0   Some recent data might be hidden     Lab Results   Component Value Date/Time    ALT (SGPT) 14 03/09/2020 04:16 PM     Lab Results   Component Value Date/Time    Hemoglobin A1c 10.9 (H) 03/10/2020 03:50 AM       EKG    ECHO (12/19)  Left Ventricle Normal cavity size and wall thickness. Severe systolic dysfunction. The estimated ejection fraction is 26 - 30%. Visually measured ejection fraction. Global hypokinesis observed. There is severe (grade 3) left ventricular diastolic dysfunction. Elevated left ventricular diastolic pressure. Wall Scoring The left ventricular wall motion is globally hypokinetic. Left Atrium Mildly dilated left atrium. Left Atrium volume index is 40.14 mL/m2. Right Ventricle Mildly dilated right ventricle. Reduced systolic function. Right Atrium Normal cavity size. Aortic Valve Trileaflet valve structure and no stenosis. Mild aortic valve regurgitation. Mitral Valve No stenosis. Mitral annular dilatation. Moderate regurgitation. Tricuspid Valve No stenosis. Non-specific thickening. Mild tricuspid valve regurgitation. Pulmonary arterial systolic pressure is 61.3 mmHg. Mild pulmonary hypertension. STRESS TEST (12/19)  · Baseline ECG: Normal EKG, non-specific ST-T wave abnormalities.   · Gated SPECT: Left ventricular function post-stress was abnormal. Calculated ejection fraction is 30%. · Left ventricular perfusion is equivocal.  · There is small area of mildly intense reversible defect that involves distal anterior wall. This could represent mild ischemia or artifact. Clinical correlation is recommended    CATHETERIZATION (12/19)  Left Main   The vessel is large. The vessel is angiographically normal.   Left Anterior Descending   Mid 20-30% narrowing. Eccentric 70% tubular stenosis at D2 followed by eccentric 90% another tubular lesion at D3. Diagonal branches are small caliber vessels Status post PCI and stenting of mid LAD stenosis using 2.5 x 23 mm resolute MAHAD   Mid LAD lesion 85% stenosed. . Lesion is the culprit lesion. The lesion is serial. There is moderate plaque burden detected. Ramus Intermedius   Mid 50% tubular narrowing. Far distal 90-95% tubular stenosis. Left Circumflex   The vessel exhibits minimal luminal irregularities. OM: Angiographically normal. Inferior branch has 30-40% tubular lesion   Right Coronary Artery   Mid vessel 30-40% luminal irregularities. Posterolateral branch small caliber vessel with far distal long sequential 90-95% lesions (not suitable for PCI) RPDA without any obstructive disease     IMPRESSION & PLAN:  Ms. Bronson John 77 y.o. female with multiple medical problem    Cardiomyopathy:  Patient has been diagnosed with severe LV dysfunction with EF 25% in December 2019. Last echo with EF of 30-35% in March 2020. Currently on Diovan, spironolactone, Coreg and Bumex. Unable to afford Entresto in past  No evidence of fluid overload on exam.  NYHA class II  We will continue medical management and repeat echo in 3 months      CAD  Patient had non-STEMI in December 2019 during hospitalization with elevated troponin. Nuclear stress test showed perfusion defect. Cardiac cath showed CAD and she had LAD stent in December 2019  Continue aspirin lifelong.   Brilinta was switched to Plavix in February 2020 because it was expensive. Continue Plavix for a year  Continue beta-blocker and statin    Hypertension:  Blood pressure is normal continue current medications. BP today 128/77 mm Hg. Hyperlipidemia:  Started on atorvastatin 20 mg daily. Continue same. Last LDL 54    Diabetes:  Goal hemoglobin A1c less than 7 is recommended. Defer to PCP. Last A1c 10.1    Lung cancer:  Defer to primary team and heme-onc team.    This plan was discussed with patient who is in agreement. Thank you for allowing me to participate in patient care. Please feel free to call me if you have any question or concern. Mona Rogers MD  Please note: This document has been produced using voice recognition software. Unrecognized errors in transcription may be present.

## 2020-07-13 ENCOUNTER — HOSPITAL ENCOUNTER (OUTPATIENT)
Dept: CT IMAGING | Age: 65
Discharge: HOME | End: 2020-07-13
Payer: MEDICARE

## 2020-07-13 DIAGNOSIS — C34.11 MALIGNANT NEOPLASM OF UPPER LOBE OF RIGHT LUNG (HCC): ICD-10-CM

## 2020-07-13 LAB — CREAT UR-MCNC: 0.8 MG/DL (ref 0.6–1.3)

## 2020-07-13 PROCEDURE — 71260 CT THORAX DX C+: CPT

## 2020-07-13 PROCEDURE — 82565 ASSAY OF CREATININE: CPT

## 2020-07-13 PROCEDURE — 74011636320 HC RX REV CODE- 636/320: Performed by: RADIOLOGY

## 2020-07-13 RX ADMIN — IOPAMIDOL 91 ML: 612 INJECTION, SOLUTION INTRAVENOUS at 11:06

## 2020-08-12 ENCOUNTER — TELEPHONE (OUTPATIENT)
Dept: CARDIOLOGY CLINIC | Age: 65
End: 2020-08-12

## 2020-08-12 ENCOUNTER — HOSPITAL ENCOUNTER (OUTPATIENT)
Dept: PREADMISSION TESTING | Age: 65
Discharge: HOME | End: 2020-08-12
Payer: MEDICARE

## 2020-08-12 DIAGNOSIS — Z01.812 BLOOD TESTS PRIOR TO TREATMENT OR PROCEDURE: ICD-10-CM

## 2020-08-12 DIAGNOSIS — Z20.828 EXPOSURE TO SARS-ASSOCIATED CORONAVIRUS: ICD-10-CM

## 2020-08-12 DIAGNOSIS — B34.2 CORONAVIRUS INFECTION, UNSPECIFIED: ICD-10-CM

## 2020-08-12 PROCEDURE — 87635 SARS-COV-2 COVID-19 AMP PRB: CPT

## 2020-08-12 NOTE — TELEPHONE ENCOUNTER
Patient called to get instructions for holding plavix for upcoming bronchoscopy procedure Monday 8/17/20. Verbal order and read back per Evon Mcmullen MD  May hold plavix starting 8/13/20 until procedure but keep taking ASA 81 mg. Patient had stents placed 12/19. This has been fully explained to the patient, who indicates understanding.

## 2020-08-13 LAB — SARS-COV-2, COV2NT: NOT DETECTED

## 2020-08-14 ENCOUNTER — ANESTHESIA EVENT (OUTPATIENT)
Dept: ENDOSCOPY | Age: 65
End: 2020-08-14
Payer: MEDICARE

## 2020-08-17 ENCOUNTER — APPOINTMENT (OUTPATIENT)
Dept: GENERAL RADIOLOGY | Age: 65
End: 2020-08-17
Attending: INTERNAL MEDICINE
Payer: MEDICARE

## 2020-08-17 ENCOUNTER — HOSPITAL ENCOUNTER (OUTPATIENT)
Age: 65
Setting detail: OUTPATIENT SURGERY
Discharge: HOME | End: 2020-08-17
Attending: INTERNAL MEDICINE | Admitting: INTERNAL MEDICINE
Payer: MEDICARE

## 2020-08-17 ENCOUNTER — ANESTHESIA (OUTPATIENT)
Dept: ENDOSCOPY | Age: 65
End: 2020-08-17
Payer: MEDICARE

## 2020-08-17 VITALS
TEMPERATURE: 97.7 F | RESPIRATION RATE: 16 BRPM | SYSTOLIC BLOOD PRESSURE: 127 MMHG | BODY MASS INDEX: 26.18 KG/M2 | WEIGHT: 166.8 LBS | HEIGHT: 67 IN | HEART RATE: 86 BPM | DIASTOLIC BLOOD PRESSURE: 53 MMHG | OXYGEN SATURATION: 98 %

## 2020-08-17 PROBLEM — R91.8 LUNG MASS: Status: ACTIVE | Noted: 2020-08-17

## 2020-08-17 LAB
BUN BLD-MCNC: 14 MG/DL (ref 7–18)
CHLORIDE BLD-SCNC: 102 MMOL/L (ref 100–108)
GLUCOSE BLD STRIP.AUTO-MCNC: 160 MG/DL (ref 70–110)
GLUCOSE BLD STRIP.AUTO-MCNC: 319 MG/DL (ref 74–106)
HCT VFR BLD CALC: 34 % (ref 36–49)
HGB BLD-MCNC: 11.6 G/DL (ref 12–16)
POTASSIUM BLD-SCNC: 3.5 MMOL/L (ref 3.5–5.5)
SODIUM BLD-SCNC: 138 MMOL/L (ref 136–145)

## 2020-08-17 PROCEDURE — 74011000258 HC RX REV CODE- 258: Performed by: NURSE ANESTHETIST, CERTIFIED REGISTERED

## 2020-08-17 PROCEDURE — 77030008683 HC TU ET CUF COVD -A: Performed by: ANESTHESIOLOGY

## 2020-08-17 PROCEDURE — 71046 X-RAY EXAM CHEST 2 VIEWS: CPT

## 2020-08-17 PROCEDURE — 77030013140 HC MSK NEB VYRM -A: Performed by: INTERNAL MEDICINE

## 2020-08-17 PROCEDURE — 74011250636 HC RX REV CODE- 250/636: Performed by: NURSE ANESTHETIST, CERTIFIED REGISTERED

## 2020-08-17 PROCEDURE — 88305 TISSUE EXAM BY PATHOLOGIST: CPT

## 2020-08-17 PROCEDURE — 77030008680 HC TU ET BRONCH COOK -C: Performed by: INTERNAL MEDICINE

## 2020-08-17 PROCEDURE — 76210000063 HC OR PH I REC FIRST 0.5 HR: Performed by: INTERNAL MEDICINE

## 2020-08-17 PROCEDURE — 77030018712 HC DEV BLLN INFL BSC -B: Performed by: INTERNAL MEDICINE

## 2020-08-17 PROCEDURE — 77030012699 HC VLV SUC CNTRL OCOA -A: Performed by: INTERNAL MEDICINE

## 2020-08-17 PROCEDURE — 87070 CULTURE OTHR SPECIMN AEROBIC: CPT

## 2020-08-17 PROCEDURE — 88112 CYTOPATH CELL ENHANCE TECH: CPT

## 2020-08-17 PROCEDURE — 82962 GLUCOSE BLOOD TEST: CPT

## 2020-08-17 PROCEDURE — 87077 CULTURE AEROBIC IDENTIFY: CPT

## 2020-08-17 PROCEDURE — 87186 SC STD MICRODIL/AGAR DIL: CPT

## 2020-08-17 PROCEDURE — 76060000032 HC ANESTHESIA 0.5 TO 1 HR: Performed by: INTERNAL MEDICINE

## 2020-08-17 PROCEDURE — 74011250637 HC RX REV CODE- 250/637: Performed by: NURSE ANESTHETIST, CERTIFIED REGISTERED

## 2020-08-17 PROCEDURE — 74011636637 HC RX REV CODE- 636/637: Performed by: NURSE ANESTHETIST, CERTIFIED REGISTERED

## 2020-08-17 PROCEDURE — 82947 ASSAY GLUCOSE BLOOD QUANT: CPT

## 2020-08-17 PROCEDURE — 76040000007: Performed by: INTERNAL MEDICINE

## 2020-08-17 PROCEDURE — 77030008477 HC STYL SATN SLP COVD -A: Performed by: ANESTHESIOLOGY

## 2020-08-17 PROCEDURE — 74011000250 HC RX REV CODE- 250: Performed by: NURSE ANESTHETIST, CERTIFIED REGISTERED

## 2020-08-17 RX ORDER — SODIUM CHLORIDE 0.9 % (FLUSH) 0.9 %
5-40 SYRINGE (ML) INJECTION AS NEEDED
Status: DISCONTINUED | OUTPATIENT
Start: 2020-08-17 | End: 2020-08-17 | Stop reason: HOSPADM

## 2020-08-17 RX ORDER — SODIUM CHLORIDE 0.9 % (FLUSH) 0.9 %
5-40 SYRINGE (ML) INJECTION AS NEEDED
Status: CANCELLED | OUTPATIENT
Start: 2020-08-17

## 2020-08-17 RX ORDER — INSULIN LISPRO 100 [IU]/ML
INJECTION, SOLUTION INTRAVENOUS; SUBCUTANEOUS ONCE
Status: COMPLETED | OUTPATIENT
Start: 2020-08-17 | End: 2020-08-17

## 2020-08-17 RX ORDER — SODIUM CHLORIDE, SODIUM LACTATE, POTASSIUM CHLORIDE, CALCIUM CHLORIDE 600; 310; 30; 20 MG/100ML; MG/100ML; MG/100ML; MG/100ML
25 INJECTION, SOLUTION INTRAVENOUS CONTINUOUS
Status: DISCONTINUED | OUTPATIENT
Start: 2020-08-17 | End: 2020-08-17 | Stop reason: HOSPADM

## 2020-08-17 RX ORDER — MAGNESIUM SULFATE 100 %
4 CRYSTALS MISCELLANEOUS AS NEEDED
Status: DISCONTINUED | OUTPATIENT
Start: 2020-08-17 | End: 2020-08-17 | Stop reason: HOSPADM

## 2020-08-17 RX ORDER — FENTANYL CITRATE 50 UG/ML
INJECTION, SOLUTION INTRAMUSCULAR; INTRAVENOUS AS NEEDED
Status: DISCONTINUED | OUTPATIENT
Start: 2020-08-17 | End: 2020-08-17 | Stop reason: HOSPADM

## 2020-08-17 RX ORDER — INSULIN LISPRO 100 [IU]/ML
INJECTION, SOLUTION INTRAVENOUS; SUBCUTANEOUS ONCE
Status: DISCONTINUED | OUTPATIENT
Start: 2020-08-17 | End: 2020-08-17 | Stop reason: HOSPADM

## 2020-08-17 RX ORDER — LIDOCAINE HYDROCHLORIDE 10 MG/ML
50 INJECTION INFILTRATION; PERINEURAL ONCE
Status: CANCELLED | OUTPATIENT
Start: 2020-08-17 | End: 2020-08-17

## 2020-08-17 RX ORDER — SODIUM CHLORIDE 0.9 % (FLUSH) 0.9 %
5-40 SYRINGE (ML) INJECTION EVERY 8 HOURS
Status: DISCONTINUED | OUTPATIENT
Start: 2020-08-17 | End: 2020-08-17 | Stop reason: HOSPADM

## 2020-08-17 RX ORDER — SUCCINYLCHOLINE CHLORIDE 100 MG/5ML
SYRINGE (ML) INTRAVENOUS AS NEEDED
Status: DISCONTINUED | OUTPATIENT
Start: 2020-08-17 | End: 2020-08-17 | Stop reason: HOSPADM

## 2020-08-17 RX ORDER — SODIUM CHLORIDE 0.9 % (FLUSH) 0.9 %
5-40 SYRINGE (ML) INJECTION EVERY 8 HOURS
Status: CANCELLED | OUTPATIENT
Start: 2020-08-17

## 2020-08-17 RX ORDER — LIDOCAINE HYDROCHLORIDE 20 MG/ML
INJECTION, SOLUTION EPIDURAL; INFILTRATION; INTRACAUDAL; PERINEURAL AS NEEDED
Status: DISCONTINUED | OUTPATIENT
Start: 2020-08-17 | End: 2020-08-17 | Stop reason: HOSPADM

## 2020-08-17 RX ORDER — SODIUM CHLORIDE 9 MG/ML
25 INJECTION, SOLUTION INTRAVENOUS CONTINUOUS
Status: CANCELLED | OUTPATIENT
Start: 2020-08-17

## 2020-08-17 RX ORDER — FAMOTIDINE 20 MG/1
20 TABLET, FILM COATED ORAL ONCE
Status: COMPLETED | OUTPATIENT
Start: 2020-08-17 | End: 2020-08-17

## 2020-08-17 RX ORDER — ONDANSETRON 2 MG/ML
INJECTION INTRAMUSCULAR; INTRAVENOUS AS NEEDED
Status: DISCONTINUED | OUTPATIENT
Start: 2020-08-17 | End: 2020-08-17 | Stop reason: HOSPADM

## 2020-08-17 RX ORDER — DEXTROSE MONOHYDRATE 100 MG/ML
125-250 INJECTION, SOLUTION INTRAVENOUS AS NEEDED
Status: DISCONTINUED | OUTPATIENT
Start: 2020-08-17 | End: 2020-08-17 | Stop reason: HOSPADM

## 2020-08-17 RX ORDER — LIDOCAINE HYDROCHLORIDE 20 MG/ML
JELLY TOPICAL ONCE
Status: CANCELLED | OUTPATIENT
Start: 2020-08-17 | End: 2020-08-17

## 2020-08-17 RX ORDER — ONDANSETRON 2 MG/ML
4 INJECTION INTRAMUSCULAR; INTRAVENOUS
Status: DISCONTINUED | OUTPATIENT
Start: 2020-08-17 | End: 2020-08-17 | Stop reason: HOSPADM

## 2020-08-17 RX ORDER — SODIUM CHLORIDE, SODIUM LACTATE, POTASSIUM CHLORIDE, CALCIUM CHLORIDE 600; 310; 30; 20 MG/100ML; MG/100ML; MG/100ML; MG/100ML
50 INJECTION, SOLUTION INTRAVENOUS CONTINUOUS
Status: DISCONTINUED | OUTPATIENT
Start: 2020-08-17 | End: 2020-08-17 | Stop reason: HOSPADM

## 2020-08-17 RX ORDER — PROPOFOL 10 MG/ML
INJECTION, EMULSION INTRAVENOUS AS NEEDED
Status: DISCONTINUED | OUTPATIENT
Start: 2020-08-17 | End: 2020-08-17 | Stop reason: HOSPADM

## 2020-08-17 RX ORDER — DEXAMETHASONE SODIUM PHOSPHATE 4 MG/ML
INJECTION, SOLUTION INTRA-ARTICULAR; INTRALESIONAL; INTRAMUSCULAR; INTRAVENOUS; SOFT TISSUE AS NEEDED
Status: DISCONTINUED | OUTPATIENT
Start: 2020-08-17 | End: 2020-08-17 | Stop reason: HOSPADM

## 2020-08-17 RX ADMIN — PROPOFOL 150 MG: 10 INJECTION, EMULSION INTRAVENOUS at 07:52

## 2020-08-17 RX ADMIN — SODIUM CHLORIDE, SODIUM LACTATE, POTASSIUM CHLORIDE, AND CALCIUM CHLORIDE 50 ML/HR: 600; 310; 30; 20 INJECTION, SOLUTION INTRAVENOUS at 06:49

## 2020-08-17 RX ADMIN — FAMOTIDINE 20 MG: 20 TABLET ORAL at 06:49

## 2020-08-17 RX ADMIN — LIDOCAINE HYDROCHLORIDE 60 MG: 20 INJECTION, SOLUTION INTRAVENOUS at 07:52

## 2020-08-17 RX ADMIN — FENTANYL CITRATE 100 MCG: 50 INJECTION, SOLUTION INTRAMUSCULAR; INTRAVENOUS at 07:51

## 2020-08-17 RX ADMIN — PHENYLEPHRINE HYDROCHLORIDE 100 MCG: 10 INJECTION INTRAVENOUS at 08:12

## 2020-08-17 RX ADMIN — DEXAMETHASONE SODIUM PHOSPHATE 4 MG: 4 INJECTION, SOLUTION INTRA-ARTICULAR; INTRALESIONAL; INTRAMUSCULAR; INTRAVENOUS; SOFT TISSUE at 08:08

## 2020-08-17 RX ADMIN — ONDANSETRON 4 MG: 2 SOLUTION INTRAMUSCULAR; INTRAVENOUS at 08:08

## 2020-08-17 RX ADMIN — Medication 100 MG: at 07:52

## 2020-08-17 RX ADMIN — INSULIN LISPRO 12 UNITS: 100 INJECTION, SOLUTION INTRAVENOUS; SUBCUTANEOUS at 07:23

## 2020-08-17 NOTE — PROCEDURES
[facility]  PROCEDURE NOTE    Name:  Elie Osborne  MR#:   [id number]  :  1955  ACCOUNT #:  [de-identified]  DATE OF SERVICE:  2020    REFERRING PHYSICIAN:  Dr. Kiel Mora:  The patient is a known patient with non-small-cell lung cancer, already treated multimodality treatment presents with further chest x-ray abnormalities and chronic cough and the concern is to rule out recurrent lung cancer. POSTOPERATIVE DIAGNOSES:  1. Diffuse tracheobronchitis. 2.  Stigmata of chronic bronchial disease. 3.  Friable mucosa. 4.  Changes possibly radiation changes and no endobronchial lesions were noted. 5.  There was minimal oozing from the areas of friability of the mucosa. PROCEDURE PERFORMED:  Bronchoscopy. SURGEON:  Sara Braswell MD    ASSISTANT:  Robert Aaron RN and Eliseo Alvarez, endoscopy tech and anesthesia team (MD and CRNA). ANESTHESIA:  Premedications and anesthesia were delivered by the anesthesia team (MD and CRNA). They provided patient with translaryngeal intubation, anesthesia, mechanical ventilation during their procedure. For specific medications used in this case, please review their notes as they were both present during the procedure. ENDOSCOPE USED:  Olympus [id number]    ESTIMATED BLOOD LOSS:  Minimal.    SPECIMENS REMOVED:  1. From the right mainstem bronchus, anterior and posterior wall, we did obtain cytology. Specimen for the bronchial genomics . 2.  From the right middle lobe, we did obtain cytology brushings x4 for a total of 8 slides. 3.  No biopsies. 4.  We obtained bronchoalveolar lavage from this area. 5.  From the right lower lobe, we did obtain bronchoalveolar lavage followed by cytology brushings x4 for a total of 8 slides. No biopsies were attempted at this area. Please note that the patient is on Plavix. COMPLICATIONS:  None. IMPLANTS:  None.     PROCEDURE:  The patient was brought to endoscopy suite #3 after signing her informed consent for anesthesia and bronchoscopy, the anesthesia team provided her with translaryngeal intubation, mechanical ventilation, and anesthesia. Once the ET tube was secured in her airway, we applied a swivel valve and after calling for a \"time-out,\" we entered into her airway finding the lower trachea to be erythematosus and there were no endotracheal lesions and the piter was sharp, movable and central.  The ET tube was slightly directed to the right bronchial system, pulled it back a little bit, and then we entered the right mainstem bronchus right at the entrance of the right mainstem bronchus, obtained anterior and posterior wall cytology for the bronchial genomics . 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 Obtained from the right middle lobe, bronchoalveolar lavage, followed by cytology brushings x4 for a total of 8 slides. No biopsies (Plavix). Then entered the right lower lobe and on direct visualization fluoroscopic control obtained bronchoalveolar lavage followed by cytology brushings x4 for a total of 8 slides. No biopsies (Plavix). Returned to the piter into the left mainstem bronchus, sequentially inspected the mainstem bronchus, LC2, left upper lobe, superior and inferior division, left lower lobe including LB6 finding no endobronchial lesion. No specimens were obtained from the left. At this point, we did terminate the procedure. The patient was monitored with EKG, heart rate, pulse oximetry and blood pressure as well as end-tidal CO2 per ASA guidelines. A stat chest x-ray has been requested to rule out pneumothorax.   She is going to be observed in the PACU and discharged once she meets anesthesia criteria. I have called her , Mr. Zehra Morales, telephone number [phone] and informed him of my findings and that that we will call him when she is ready to be discharged. She has a followup in our office next week. Appreciate very much from the referring physician allowing us to participate in her.       Toby Horta MD      [id number]  D:  08/17/2020 8:36  T:  08/17/2020 12:48  JOB #:  [id number]  CC:  MD Katlin Acuña MD

## 2020-08-17 NOTE — PROCEDURES
IMMEDIATE POST PROCEDURE/SURGERY NOTE      Surgeon/Physician:  Konrad Nova MD    Assistants:   Bossman Mathur, RN and Anneliese Francisco ET ?  Anesthesia team: MD + CRNA     Pres Procedure/Operative Diagnosis:  Lung CA / R/o Recurrence / chronic cough / wheezing    Post-Procedure/Operative Diagnosis:  Same     Procedure Performed:  bronch    Description of each procedure finding: Lower trachea shows chronic appearing inflammation / diffuse tracheoronchitis and stigmata of chronic bronchial disease / no EB lesions / friable mucosa    Specimens Removed:  RMSB: Ant and post walls cytology for the Broncial Genomics   RML : BALF + Cytology x 4 - 8 slides   RLL: Cytology x  4 = 8 slides + BALF  NO BIOPSIES: Plavix    EBL: very minimal from friable mucosa/ self stopping  Dictated: [id number]  Konrad Nova MD    8/17/2020   8:21 AM

## 2020-08-17 NOTE — DISCHARGE INSTRUCTIONS
Patient Education   Patient Education   Learning About Coronavirus (JONT-47)  Coronavirus [phone]): Overview  What is coronavirus (HBIQQ-03)? The coronavirus disease (COVID-19) is caused by a virus. It is an illness that was first found in [place], [place], in December 2019. It has since spread worldwide. The virus can cause fever, cough, and trouble breathing. In severe cases, it can cause pneumonia and make it hard to breathe without help. It can cause death. Coronaviruses are a large group of viruses. They cause the common cold. They also cause more serious illnesses like Middle East respiratory syndrome (MERS) and severe acute respiratory syndrome (SARS). COVID-19 is caused by a novel coronavirus. That means it's a new type that has not been seen in people before. This virus spreads person-to-person through droplets from coughing and sneezing. It can also spread when you are close to someone who is infected. And it can spread when you touch something that has the virus on it, such as a doorknob or a tabletop. What can you do to protect yourself from coronavirus (COVID-19)? The best way to protect yourself from getting sick is to:  · Avoid areas where there is an outbreak. · Avoid contact with people who may be infected. · Wash your hands often with soap or alcohol-based hand sanitizers. · Avoid crowds and try to stay at least 6 feet away from other people. · Wash your hands often, especially after you cough or sneeze. Use soap and water, and scrub for at least 20 seconds. If soap and water aren't available, use an alcohol-based hand . · Avoid touching your mouth, nose, and eyes. What can you do to avoid spreading the virus to others? To help avoid spreading the virus to others:  · Cover your mouth with a tissue when you cough or sneeze. Then throw the tissue in the trash. · Use a disinfectant to clean things that you touch often.   · Stay home if you are sick or have been exposed to the virus. Don't go to school, work, or public areas. And don't use public transportation. · If you are sick:  ? Leave your home only if you need to get medical care. But call the doctor's office first so they know you're coming. And wear a face mask, if you have one.  ? If you have a face mask, wear it whenever you're around other people. It can help stop the spread of the virus when you cough or sneeze. ? Clean and disinfect your home every day. Use household  and disinfectant wipes or sprays. Take special care to clean things that you grab with your hands. These include doorknobs, remote controls, phones, and handles on your refrigerator and microwave. And don't forget countertops, tabletops, bathrooms, and computer keyboards. When to call for help  Call 911 anytime you think you may need emergency care. For example, call if:  · You have severe trouble breathing. (You can't talk at all.)  · You have constant chest pain or pressure. · You are severely dizzy or lightheaded. · You are confused or can't think clearly. · Your face and lips have a blue color. · You pass out (lose consciousness) or are very hard to wake up. Call your doctor now if you develop symptoms such as:  · Shortness of breath. · Fever. · Cough. If you need to get care, call ahead to the doctor's office for instructions before you go. Make sure you wear a face mask, if you have one, to prevent exposing other people to the virus. Where can you get the latest information? The following health organizations are tracking and studying this virus. Their websites contain the most up-to-date information. Kolby Palmer also learn what to do if you think you may have been exposed to the virus. · U.S. Centers for Disease Control and Prevention (CDC): The CDC provides updated news about the disease and travel advice. The website also tells you how to prevent the spread of infection.  www.cdc.gov  · World Health Organization [facility]): WHO offers information about the virus outbreaks. WHO also has travel advice. www.who.int  Current as of: April 1, 2020               Content Version: 12.4  © 2006-2020 Healthwise, [employer]. Care instructions adapted under license by your healthcare professional. If you have questions about a medical condition or this instruction, always ask your healthcare professional. [address] any warranty or liability for your use of this information. DISCHARGE SUMMARY from Nurse    PATIENT INSTRUCTIONS:    After general anesthesia or intravenous sedation, for 24 hours or while taking prescription Narcotics:  · Limit your activities  · Do not drive and operate hazardous machinery  · Do not make important personal or business decisions  · Do  not drink alcoholic beverages  · If you have not urinated within 8 hours after discharge, please contact your surgeon on call. Report the following to your surgeon:  · Excessive pain, swelling, redness or odor of or around the surgical area  · Temperature over 100.5  · Nausea and vomiting lasting longer than 4 hours or if unable to take medications        *  Please give a list of your current medications to your Primary Care Provider. *  Please update this list whenever your medications are discontinued, doses are      changed, or new medications (including over-the-counter products) are added. *  Please carry medication information at all times in case of emergency situations. These are general instructions for a healthy lifestyle:    No smoking/ No tobacco products/ Avoid exposure to second hand smoke  Surgeon General's Warning:  Quitting smoking now greatly reduces serious risk to your health.     Obesity, smoking, and sedentary lifestyle greatly increases your risk for illness    A healthy diet, regular physical exercise & weight monitoring are important for maintaining a healthy lifestyle    You may be retaining fluid if you have a history of heart failure or if you experience any of the following symptoms:  Weight gain of 3 pounds or more overnight or 5 pounds in a week, increased swelling in our hands or feet or shortness of breath while lying flat in bed. Please call your doctor as soon as you notice any of these symptoms; do not wait until your next office visit. Patient armband removed and shredded    The discharge information has been reviewed with the patient. The patient verbalized understanding. Discharge medications reviewed with the patient and appropriate educational materials and side effects teaching were provided. ___________________________________________________________________________________________________________________________________     Bronchoscopy: What to Expect at Home  Your Recovery     Bronchoscopy lets your doctor look at your airway through a tube called a bronchoscope. Afterward, you may feel tired for 1 or 2 days. Your mouth may feel very dry for several hours after the procedure. You may also have a sore throat and a hoarse voice for a few days. Sucking on throat lozenges or gargling with warm salt water may help soothe your sore throat. If a sample of tissue (biopsy) was taken, you may spit up a small amount of blood or have bloody saliva. This is normal.  Do not drive for at least 8 hours after the procedure. Do not smoke for at least 24 hours. This care sheet gives you a general idea about how long it will take for you to recover. But each person recovers at a different pace. Follow the steps below to get better as quickly as possible. How can you care for yourself at home? Activity  · Do not eat anything for 2 hours after the procedure. · Rest when you feel tired. Getting enough sleep will help you recover. · Avoid strenuous activities, such as bicycle riding, jogging, weight lifting, or aerobic exercise, until your doctor says it is okay. · Ask your doctor when you can drive again.   Diet  · You can eat your normal diet. If your stomach is upset, try bland, low-fat foods like plain rice, broiled chicken, toast, and yogurt. · If it is painful to swallow, start out with cold drinks, flavored ice pops, and ice cream. Next, try soft foods like pudding, yogurt, canned or cooked fruit, scrambled eggs, and mashed potatoes. Avoid eating hard or scratchy foods like chips or raw vegetables. Avoid orange or tomato juice and other acidic foods that can sting the throat. · Drink plenty of fluids to avoid becoming dehydrated (unless your doctor tells you not to). Medicines  · Take pain medicines exactly as directed. ? If the doctor gave you a prescription medicine for pain, take it as prescribed. ? If you are not taking a prescription pain medicine, ask your doctor if you can take an over-the-counter medicine. · If you think your pain medicine is making you sick to your stomach:  ? Take your medicine after meals (unless your doctor has told you not to). ? Ask your doctor for a different pain medicine. · If your doctor prescribed antibiotics, take them as directed. Do not stop taking them just because you feel better. You need to take the full course of antibiotics. Follow-up care is a key part of your treatment and safety. Be sure to make and go to all appointments, and call your doctor if you are having problems. It's also a good idea to know your test results and keep a list of the medicines you take. When should you call for help? [id number] anytime you think you may need emergency care. For example, call if:  · You passed out (lost consciousness). · You have sudden chest pain and shortness of breath. · You cough up large amounts of bright red blood. · You have severe pain in your chest.  · You have severe trouble breathing. Call your doctor now or seek immediate medical care if:  · You cough up more than a few tablespoons of blood.   · You have pain that does not get better after you take pain medicine. · You have a fever over 100°F.  · You still sound hoarse after a few days. · You have bubbles under the skin around the collarbone. These may crackle and pop when you press on them. Watch closely for changes in your health, and be sure to contact your doctor if you have any problems. Where can you learn more? Go to http://www.gray.com/  Enter H631 in the search box to learn more about \"Bronchoscopy: What to Expect at Home. \"  Current as of: February 24, 2020               Content Version: 12.5  © 5618-0347 Healthwise, [employer]. Care instructions adapted under license by [employer] (which disclaims liability or warranty for this information). If you have questions about a medical condition or this instruction, always ask your healthcare professional. [address] any warranty or liability for your use of this information.

## 2020-08-17 NOTE — PERIOP NOTES
. 
Pre-Op Summary Pt arrived via car with family/friend and is oriented to time, place, person and situation. Patient with steady gait with none assistive devices. Visit Vitals /88 (BP 1 Location: Left arm, BP Patient Position: Sitting) Pulse 92 Temp 97.6 °F (36.4 °C) Resp 18 Ht 5' 7\" (1.702 m) Wt 75.7 kg (166 lb 12.8 oz) SpO2 98% Breastfeeding No  
BMI 26.12 kg/m² Peripheral IV located on {RIGHT/LEFT:19623} {site:19935}. Patients belongings are located ***. Patient's point of contact is Keith Stark [phone] Wei Cleary They will be leaving and coming back. They are able to receive medication information. They will be their ride home.

## 2020-08-20 LAB
BACTERIA SPEC CULT: ABNORMAL
GRAM STN SPEC: ABNORMAL
SERVICE CMNT-IMP: ABNORMAL
SERVICE CMNT-IMP: ABNORMAL

## 2020-09-23 ENCOUNTER — HOSPITAL ENCOUNTER (OUTPATIENT)
Dept: CT IMAGING | Age: 65
Discharge: HOME | End: 2020-09-23
Attending: INTERNAL MEDICINE
Payer: MEDICARE

## 2020-09-23 DIAGNOSIS — C34.11 MALIGNANT NEOPLASM OF UPPER LOBE OF RIGHT LUNG (HCC): ICD-10-CM

## 2020-09-23 LAB — CREAT UR-MCNC: 0.7 MG/DL (ref 0.6–1.3)

## 2020-09-23 PROCEDURE — 82565 ASSAY OF CREATININE: CPT

## 2020-09-23 PROCEDURE — 74011000636 HC RX REV CODE- 636: Performed by: INTERNAL MEDICINE

## 2020-09-23 PROCEDURE — 71260 CT THORAX DX C+: CPT

## 2020-09-23 RX ADMIN — IOPAMIDOL 80 ML: 612 INJECTION, SOLUTION INTRAVENOUS at 09:40

## 2020-09-24 ENCOUNTER — HOSPITAL ENCOUNTER (OUTPATIENT)
Dept: NON INVASIVE DIAGNOSTICS | Age: 65
Discharge: HOME | End: 2020-09-24
Attending: INTERNAL MEDICINE
Payer: MEDICARE

## 2020-09-24 VITALS
WEIGHT: 166 LBS | BODY MASS INDEX: 26.06 KG/M2 | DIASTOLIC BLOOD PRESSURE: 53 MMHG | SYSTOLIC BLOOD PRESSURE: 127 MMHG | HEIGHT: 67 IN

## 2020-09-24 DIAGNOSIS — R06.09 DOE (DYSPNEA ON EXERTION): ICD-10-CM

## 2020-09-24 DIAGNOSIS — I50.9 CONGESTIVE HEART FAILURE, UNSPECIFIED HF CHRONICITY, UNSPECIFIED HEART FAILURE TYPE (HCC): ICD-10-CM

## 2020-09-24 LAB
ECHO AR MAX VEL PISA: 440.83 CM/S
ECHO AV PEAK GRADIENT: 77.73 MMHG
ECHO AV REGURGITANT PHT: 0.54 S
ECHO IVC PROX: 1.52 CM
ECHO LA AREA 4C: 19.46 CM2
ECHO LA VOL 2C: 63.58 ML (ref 22–52)
ECHO LA VOL 4C: 54.05 ML (ref 22–52)
ECHO LA VOL BP: 66.33 ML (ref 22–52)
ECHO LA VOL/BSA BIPLANE: 35.51 ML/M2 (ref 16–28)
ECHO LA VOLUME INDEX A2C: 34.03 ML/M2 (ref 16–28)
ECHO LA VOLUME INDEX A4C: 28.93 ML/M2 (ref 16–28)
ECHO LV E' LATERAL VELOCITY: 5.83 CM/S
ECHO LV E' SEPTAL VELOCITY: 4.22 CM/S
ECHO LV EDV A2C: 168.16 ML
ECHO LV EDV A4C: 160.82 ML
ECHO LV EDV BP: 164.38 ML (ref 56–104)
ECHO LV EDV INDEX A4C: 86.1 ML/M2
ECHO LV EDV INDEX BP: 88 ML/M2
ECHO LV EDV NDEX A2C: 90 ML/M2
ECHO LV EJECTION FRACTION A2C: 49 PERCENT
ECHO LV EJECTION FRACTION A4C: 53 PERCENT
ECHO LV EJECTION FRACTION BIPLANE: 49.5 PERCENT (ref 55–100)
ECHO LV ESV A2C: 85.96 ML
ECHO LV ESV A4C: 75.6 ML
ECHO LV ESV BP: 83.08 ML (ref 19–49)
ECHO LV ESV INDEX A2C: 46 ML/M2
ECHO LV ESV INDEX A4C: 40.5 ML/M2
ECHO LV ESV INDEX BP: 44.5 ML/M2
ECHO LVOT PEAK GRADIENT: 3.79 MMHG
ECHO LVOT VTI: 16.16 CM
ECHO MV A VELOCITY: 101.09 CM/S
ECHO MV E DECELERATION TIME (DT): 0.15 S
ECHO MV E VELOCITY: 55.07 CM/S
ECHO MV E/A RATIO: 0.54
ECHO MV E/E' LATERAL: 9.45
ECHO MV E/E' RATIO (AVERAGED): 11.25
ECHO MV E/E' SEPTAL: 13.05
ECHO TV REGURGITANT MAX VELOCITY: 231.34 CM/S
ECHO TV REGURGITANT PEAK GRADIENT: 21.41 MMHG
LVOT MG: 2.01 MMHG

## 2020-09-24 PROCEDURE — 93325 DOPPLER ECHO COLOR FLOW MAPG: CPT

## 2020-09-25 ENCOUNTER — TELEPHONE (OUTPATIENT)
Dept: CARDIOLOGY CLINIC | Age: 65
End: 2020-09-25

## 2020-09-25 NOTE — TELEPHONE ENCOUNTER
Contacted pt at [facility] number. Two patient Identifiers confirmed. Advised pt per Dr Teodoro Caballero. Pt verbalized understanding.

## 2020-09-25 NOTE — TELEPHONE ENCOUNTER
Contacted pt at [facility] number. Two patient Identifiers confirmed. Advised she has bleeding her lungs and ws advised by Dr Nieves Jacques that he did not want her on plavix or aspirin. She stated he advised no blood thinners until she completed her antibiotics which she believes is 7 days. Will advise Dr Akosua Gaviria.

## 2020-09-25 NOTE — TELEPHONE ENCOUNTER
Verbal order and read back per Huey Salinas MD  Spoke with Dr Alf Noel . Pt is to continue aspirin while off plavix.

## 2020-09-25 NOTE — TELEPHONE ENCOUNTER
Patient called to report that her Pulmonologist Dr Barb Pérez suspended her Anticoaglant due to bleeding in her lungs. Dr Barb Pérez ordered it be held till treatment course is complete.

## 2020-10-06 ENCOUNTER — TELEPHONE (OUTPATIENT)
Dept: CARDIOLOGY CLINIC | Age: 65
End: 2020-10-06

## 2020-10-06 NOTE — TELEPHONE ENCOUNTER
Contacted pt at [facility] number. Two patient Identifiers confirmed. Advised pt provider not in office but I would review with him once in the office. Pt verbalized understanding.

## 2020-10-08 NOTE — TELEPHONE ENCOUNTER
Attempted to contact pt at  number, no answer. Lvm for pt to return call to office at [phone]. Will continue to try to contact pt.

## 2020-10-12 NOTE — TELEPHONE ENCOUNTER
Contacted pt at [facility] number. Two patient Identifiers confirmed. Advised pt per Dr Carolyn Calvert. Pt verbalized understanding.

## 2020-10-29 ENCOUNTER — OFFICE VISIT (OUTPATIENT)
Dept: CARDIOLOGY CLINIC | Age: 65
End: 2020-10-29
Payer: MEDICARE

## 2020-10-29 VITALS
OXYGEN SATURATION: 99 % | HEIGHT: 67 IN | SYSTOLIC BLOOD PRESSURE: 146 MMHG | TEMPERATURE: 97.6 F | WEIGHT: 196.4 LBS | DIASTOLIC BLOOD PRESSURE: 75 MMHG | HEART RATE: 72 BPM | RESPIRATION RATE: 16 BRPM | BODY MASS INDEX: 30.83 KG/M2

## 2020-10-29 DIAGNOSIS — I42.9 CARDIOMYOPATHY, UNSPECIFIED TYPE (HCC): ICD-10-CM

## 2020-10-29 DIAGNOSIS — I50.9 CONGESTIVE HEART FAILURE, UNSPECIFIED HF CHRONICITY, UNSPECIFIED HEART FAILURE TYPE (HCC): Primary | ICD-10-CM

## 2020-10-29 PROCEDURE — G8754 DIAS BP LESS 90: HCPCS | Performed by: INTERNAL MEDICINE

## 2020-10-29 PROCEDURE — G8400 PT W/DXA NO RESULTS DOC: HCPCS | Performed by: INTERNAL MEDICINE

## 2020-10-29 PROCEDURE — G8753 SYS BP > OR = 140: HCPCS | Performed by: INTERNAL MEDICINE

## 2020-10-29 PROCEDURE — 99214 OFFICE O/P EST MOD 30 MIN: CPT | Performed by: INTERNAL MEDICINE

## 2020-10-29 PROCEDURE — G8536 NO DOC ELDER MAL SCRN: HCPCS | Performed by: INTERNAL MEDICINE

## 2020-10-29 PROCEDURE — G8510 SCR DEP NEG, NO PLAN REQD: HCPCS | Performed by: INTERNAL MEDICINE

## 2020-10-29 PROCEDURE — G8428 CUR MEDS NOT DOCUMENT: HCPCS | Performed by: INTERNAL MEDICINE

## 2020-10-29 PROCEDURE — G8417 CALC BMI ABV UP PARAM F/U: HCPCS | Performed by: INTERNAL MEDICINE

## 2020-10-29 RX ORDER — CARVEDILOL 12.5 MG/1
12.5 TABLET ORAL 2 TIMES DAILY WITH MEALS
Qty: 180 TAB | Refills: 3 | Status: SHIPPED | OUTPATIENT
Start: 2020-10-29 | End: 2021-01-07 | Stop reason: SDUPTHER

## 2020-10-29 NOTE — PROGRESS NOTES
Corene Base presents today for   Chief Complaint   Patient presents with    Follow-up     Post test       Corene Base preferred language for health care discussion is english/other. Personal Protective Equipment:   Personal Protective Equipment was used including: mask-surgical and hands-gloves. Patient was placed on no precaution(s). Patient was masked. Is someone accompanying this pt? No    Is the patient using any DME equipment during OV? No    Depression Screening:  3 most recent PHQ Screens 10/29/2020   Little interest or pleasure in doing things Not at all   Feeling down, depressed, irritable, or hopeless Not at all   Total Score PHQ 2 0       Learning Assessment:  No flowsheet data found. Abuse Screening:  Abuse Screening Questionnaire 10/29/2020   Do you ever feel afraid of your partner? N   Are you in a relationship with someone who physically or mentally threatens you? N   Is it safe for you to go home? Y       Fall Risk  Fall Risk Assessment, last 12 mths 10/29/2020   Able to walk? Yes   Fall in past 12 months? No       Pt currently taking Anticoagulant therapy? No    Coordination of Care:  1. Have you been to the ER, urgent care clinic since your last visit? Hospitalized since your last visit? No    2. Have you seen or consulted any other health care providers outside of the [address] [place] [address] since your last visit? Include any pap smears or colon screening.  No

## 2020-10-29 NOTE — PROGRESS NOTES
[facility]    Ms. Brittnee Leblanc is 77 y.o. female with a history of lung cancer, CAD, cardiomyopathy, COPD, diabetes, hypertension    Patient is here today for follow-up appointment. She underwent echocardiogram recently without any issues. She denies any chest pain or chest tightness. She is using 2 pillows at night. She is taking medications regularly. She does not report any symptoms that is concerning for angina or heart failure. She denies any edema  Denies any nausea, vomiting, abdominal pain, fever, chills, sputum production. No hematuria or other bleeding complaints    Past Medical History:   Diagnosis Date    CAD (coronary artery disease)     S/P Mid LAD 2.5 X 23 mm XIENCE MAHAD (12/19)    Cardiomyopathy ([facility]     LVEF 30% (03/20) 25% (11/19)    COPD (chronic obstructive pulmonary disease) [facility]     Diabetes mellitus (HCC)     Emphysema of lung (HCC)     Esophageal ring     S/P EGD and dilation (06/20)    Hypertension     Lung cancer ([facility]        Past Surgical History:   Procedure Laterality Date    ABDOMEN SURGERY PROC UNLISTED  1972    HX APPENDECTOMY      HX CORONARY STENT PLACEMENT      HX TONSILLECTOMY      HX VASCULAR ACCESS      mediport       Current Outpatient Medications   Medication Sig    clopidogreL (Plavix) 75 mg tab Take 1 Tab by mouth daily.  spironolactone (Aldactone) 25 mg tablet Take 0.5 Tabs by mouth daily.  valsartan (DIOVAN) 40 mg tablet Take 1 Tab by mouth daily.  bumetanide (BUMEX) 0.5 mg tablet Take 1 Tab by mouth daily.  carvediloL (COREG) 6.25 mg tablet Take 1 Tab by mouth two (2) times daily (with meals).  Blood Pressure Test Kit-Large kit Take blood pressure as needed    atorvastatin (LIPITOR) 20 mg tablet Take 2 Tabs by mouth daily. (Patient taking differently: Take 20 mg by mouth daily.)    aspirin 81 mg chewable tablet Take 1 Tab by mouth daily.     insulin glargine (LANTUS U-100 INSULIN) 100 unit/mL injection 20 Units by SubCUTAneous route daily. Indications: type 2 diabetes mellitus    OTHER     budesonide (PULMICORT) 0.25 mg/2 mL nbsp by Nebulization route.  inhalational spacing device 1 Each by Does Not Apply route as needed.  insulin lispro protamin/lispro (HUMALOG MIX 75-25 KWIKPEN SC) by SubCUTAneous route Before breakfast, lunch, and dinner. Pt reported sliding scale     No current facility-administered medications for this visit. Allergies and Sensitivities:  Allergies   Allergen Reactions    Lisinopril Cough       Family History:  No family history on file. Social History:  Social History     Tobacco Use    Smoking status: Former Smoker     Years: 24.00     Last attempt to quit: 1978     Years since quittin.3    Smokeless tobacco: Never Used   Substance Use Topics    Alcohol use: No    Drug use: No     She  reports that she quit smoking about 42 years ago. She quit after 24.00 years of use. She has never used smokeless tobacco.  She  reports no history of alcohol use. Review of Systems:  Cardiac symptoms as noted above in HPI. All others negative. Denies fatigue, malaise, skin rash, joint pain, blurring vision, photophobia, neck pain, hemoptysis, chronic cough, nausea, vomiting, hematuria, burning micturition, BRBPR, chronic headaches. Physical Exam:  BP Readings from Last 3 Encounters:   10/29/20 (!) 146/75   20 (!) 127/53   20 127/53         Pulse Readings from Last 3 Encounters:   10/29/20 72   20 86   20 98          Wt Readings from Last 3 Encounters:   10/29/20 196 lb 6.4 oz (89.1 kg)   20 166 lb (75.3 kg)   20 166 lb 12.8 oz (75.7 kg)       Constitutional: Oriented to person, place, and time. HENT: Head: Normocephalic and atraumatic. Neck: No JVD present. Cardiovascular: Regular rhythm. No murmur, gallop or rubs appreciated  Lung: Breath sounds normal. No respiratory distress.  No ronchi or rales appreciated  Abdominal: No tenderness. No rebound and no guarding. Musculoskeletal: There is no lower extremity edema. No cynosis    Review of Data  LABS:   Lab Results   Component Value Date/Time    Sodium 140 03/11/2020 07:30 AM    Potassium 4.4 03/11/2020 07:30 AM    Chloride 107 03/11/2020 07:30 AM    CO2 27 03/11/2020 07:30 AM    Glucose 215 (H) 03/11/2020 07:30 AM    BUN 19 (H) 03/11/2020 07:30 AM    Creatinine 0.74 03/11/2020 07:30 AM     Lipids Latest Ref Rng & Units 12/19/2019 11/27/2019   Chol, Total <200 MG/ 206(H)   HDL 40 - 60 MG/DL 51 68(H)   LDL 0 - 100 MG/DL 54.4 109. 8(H)   Trig <150 MG/ 141   Chol/HDL Ratio 0 - 5.0   2.5 3.0   Some recent data might be hidden     Lab Results   Component Value Date/Time    ALT (SGPT) 14 03/09/2020 04:16 PM     Lab Results   Component Value Date/Time    Hemoglobin A1c 10.9 (H) 03/10/2020 03:50 AM       EKG    ECHO (12/19)  Left Ventricle Normal cavity size and wall thickness. Severe systolic dysfunction. The estimated ejection fraction is 26 - 30%. Visually measured ejection fraction. Global hypokinesis observed. There is severe (grade 3) left ventricular diastolic dysfunction. Elevated left ventricular diastolic pressure. Wall Scoring The left ventricular wall motion is globally hypokinetic. ECHO (09/20)  Left Ventricle  Wall motion: normal. The estimated EF is 45 - 50%. Visually measured ejection fraction. Mildly reduced systolic function. There is mild (grade 1) left ventricular diastolic dysfunction. Wall Scoring  The left ventricular wall motion is normal.             Left Atrium  Mildly dilated left atrium. Left Atrium volume index is 35.47 mL/m2. Right Ventricle  Normal cavity size and global systolic function. Right Atrium  Mildly dilated right atrium. Aortic Valve  No stenosis. Probably trileaflet aortic valve. Mild aortic valve regurgitation. Mitral Valve  No stenosis.  Mitral valve non-specific thickening. Trace regurgitation. Tricuspid Valve  Normal valve structure and no stenosis. Mild regurgitation. Pulmonary Artery  Pulmonary arterial systolic pressure (PASP) is 24 mmHg. Pulmonary hypertension not suggested by Doppler findings. STRESS TEST (12/19)  · Baseline ECG: Normal EKG, non-specific ST-T wave abnormalities. · Gated SPECT: Left ventricular function post-stress was abnormal. Calculated ejection fraction is 30%. · Left ventricular perfusion is equivocal.  · There is small area of mildly intense reversible defect that involves distal anterior wall. This could represent mild ischemia or artifact. Clinical correlation is recommended    CATHETERIZATION (12/19)  Left Main   The vessel is large. The vessel is angiographically normal.   Left Anterior Descending   Mid 20-30% narrowing. Eccentric 70% tubular stenosis at D2 followed by eccentric 90% another tubular lesion at D3. Diagonal branches are small caliber vessels Status post PCI and stenting of mid LAD stenosis using 2.5 x 23 mm resolute MAHAD   Mid LAD lesion 85% stenosed. . Lesion is the culprit lesion. The lesion is serial. There is moderate plaque burden detected. Ramus Intermedius   Mid 50% tubular narrowing. Far distal 90-95% tubular stenosis. Left Circumflex   The vessel exhibits minimal luminal irregularities. OM: Angiographically normal. Inferior branch has 30-40% tubular lesion   Right Coronary Artery   Mid vessel 30-40% luminal irregularities. Posterolateral branch small caliber vessel with far distal long sequential 90-95% lesions (not suitable for PCI) RPDA without any obstructive disease     IMPRESSION & PLAN:  Ms. Norman Pérez 77 y.o. female with multiple medical problem    Cardiomyopathy:  Patient has been diagnosed with severe LV dysfunction with EF 25% in December 2019. Last echo with EF of 30-35% in March 2020. LVEF 45-50% in September 2020  Currently on Diovan, spironolactone, Coreg and Bumex.   Unable to afford [facility] in past  No evidence of fluid overload on exam.  NYHA class II  There is no evidence of fluid overload, have asked patient to take Bumex every other day or as needed    CAD  Patient had non-STEMI in December 2019 during hospitalization with elevated troponin. Nuclear stress test showed perfusion defect. Cardiac cath showed CAD and she had LAD stent in December 2019  Continue aspirin lifelong. Brilinta was switched to Plavix in February 2020 because it was expensive. Continue Plavix for a year  Continue beta-blocker and statin    Hypertension:  Blood pressure is elevated at home lately. Will increase Coreg dose to 12.5 mg twice daily. Repeat blood pressure check in 2 weeks    Hyperlipidemia:  Started on atorvastatin 20 mg daily. Continue same. Last LDL 54    Diabetes:  Goal hemoglobin A1c less than 7 is recommended. Defer to PCP. Last A1c 10.1    Lung cancer:  Defer to primary team and heme-onc team.    This plan was discussed with patient who is in agreement. Thank you for allowing me to participate in patient care. Please feel free to call me if you have any question or concern. Guera Garland MD  Please note: This document has been produced using voice recognition software. Unrecognized errors in transcription may be present.

## 2020-11-16 ENCOUNTER — TELEPHONE (OUTPATIENT)
Dept: CARDIOLOGY CLINIC | Age: 65
End: 2020-11-16

## 2020-11-16 NOTE — TELEPHONE ENCOUNTER
Patient calls to report BP reading of 150/70 with pulse of 91 last 3 vitals are below: Wt Readings from Last 3 Encounters:   10/29/20 196 lb 6.4 oz (89.1 kg)   09/24/20 166 lb (75.3 kg)   08/17/20 166 lb 12.8 oz (75.7 kg)     Temp Readings from Last 3 Encounters:   10/29/20 97.6 °F (36.4 °C) (Temporal)   08/17/20 97.7 °F (36.5 °C)   06/18/20 98 °F (36.7 °C)     BP Readings from Last 3 Encounters:   10/29/20 (!) 146/75   09/24/20 (!) 127/53   08/17/20 127/53     Pulse Readings from Last 3 Encounters:   10/29/20 72   08/17/20 86   06/18/20 98       Patient recently started on Coreg 12.5 mg BID and Bumex every other day, she states she is taking her medications as prescribed. Will share above with Dr. Rosanna Emery and call patient back with instructions.

## 2020-11-18 NOTE — TELEPHONE ENCOUNTER
Contacted pt at [facility] number . Two patient Identifiers confirmed. Advised pt per Dr Sy Kilpatrick. Pt verbalized understanding.

## 2020-12-11 ENCOUNTER — TRANSCRIBE ORDER (OUTPATIENT)
Dept: SCHEDULING | Age: 65
End: 2020-12-11

## 2020-12-11 DIAGNOSIS — C34.31 MALIGNANT NEOPLASM OF LOWER LOBE BRONCHUS, RIGHT (HCC): Primary | ICD-10-CM

## 2020-12-18 ENCOUNTER — TELEPHONE (OUTPATIENT)
Dept: CARDIOLOGY CLINIC | Age: 65
End: 2020-12-18

## 2020-12-18 NOTE — TELEPHONE ENCOUNTER
Patient reports increased lower extremity swelling and 3 lbs weight gain in last several days. Patient would like call back from clinical staff.

## 2020-12-18 NOTE — TELEPHONE ENCOUNTER
Verbal order and read back per Ed Bee MD  Take 2 (0.5 mg) tabs for 3 days and call the office on Tuesday if symptoms do not improve. This has been fully explained to the patient, who indicates understanding.

## 2020-12-18 NOTE — TELEPHONE ENCOUNTER
Will discuss with Dr. Curry Topete when her is in the office and return call to patient with instructions.

## 2021-01-06 ENCOUNTER — TRANSCRIBE ORDER (OUTPATIENT)
Dept: SCHEDULING | Age: 66
End: 2021-01-06

## 2021-01-06 DIAGNOSIS — C34.11 MALIGNANT NEOPLASM OF UPPER LOBE OF RIGHT LUNG (HCC): Primary | ICD-10-CM

## 2021-01-07 NOTE — TELEPHONE ENCOUNTER
Contacted pt at [facility] number. Two patient Identifiers confirmed. Advised her swelling has been better since taking the coreg 12.5 mg tab( 1.5 tab) bid. Advised pt per TC 12/18/20 she was only to do increase for 3 days. Pt stated she thought she was supposed to only take it three days but was unsure and the swelling did go down some so she continued taking 1.5 tab of the coreg bid. Advised I would review with Dr Kirk Christine and advise.

## 2021-01-07 NOTE — TELEPHONE ENCOUNTER
Verbal order and read back per Jaison Casper MD  Have pt check BP. If BP is steady have pt continue the same.

## 2021-01-07 NOTE — TELEPHONE ENCOUNTER
Patient requires new prescription for Carvedilol. Patient has been taking 2 0.5 tabs per day as directed in last telcon. Patient unable to get refill till 1/12/21 unless new prescription is sent to pharmacy. Patient is out of medication.

## 2021-01-07 NOTE — TELEPHONE ENCOUNTER
Contacted pt at [facility] number. Two patient Identifiers confirmed. Advised pt per Dr Mireya Mcclelland. Pt stated she has to go upstairs to to get BP machine. Pt took BP and advised that BP was 168/80 P96 on right arm. Advised pt per Dr Mireya Mcclelland to continue coreg 18.75 bid. Pt verbalized understanding.

## 2021-01-08 RX ORDER — CARVEDILOL 12.5 MG/1
18.75 TABLET ORAL 2 TIMES DAILY WITH MEALS
Qty: 90 TAB | Refills: 5 | Status: SHIPPED | OUTPATIENT
Start: 2021-01-08 | End: 2021-08-24 | Stop reason: SDUPTHER

## 2021-01-15 ENCOUNTER — HOSPITAL ENCOUNTER (OUTPATIENT)
Dept: CT IMAGING | Age: 66
Discharge: HOME | End: 2021-01-15
Payer: MEDICARE

## 2021-01-15 DIAGNOSIS — C34.11 MALIGNANT NEOPLASM OF UPPER LOBE OF RIGHT LUNG (HCC): ICD-10-CM

## 2021-01-15 LAB — CREAT UR-MCNC: 1 MG/DL (ref 0.6–1.3)

## 2021-01-15 PROCEDURE — 74011000636 HC RX REV CODE- 636: Performed by: RADIOLOGY

## 2021-01-15 PROCEDURE — 71260 CT THORAX DX C+: CPT

## 2021-01-15 PROCEDURE — 82565 ASSAY OF CREATININE: CPT

## 2021-01-15 RX ADMIN — IOPAMIDOL 80 ML: 612 INJECTION, SOLUTION INTRAVENOUS at 08:20

## 2021-02-02 RX ORDER — VALSARTAN 40 MG/1
TABLET ORAL
Qty: 90 TAB | Refills: 3 | Status: SHIPPED | OUTPATIENT
Start: 2021-02-02 | End: 2021-04-29 | Stop reason: ALTCHOICE

## 2021-02-08 RX ORDER — CLOPIDOGREL BISULFATE 75 MG/1
TABLET ORAL
Qty: 90 TAB | Refills: 3 | Status: SHIPPED | OUTPATIENT
Start: 2021-02-08 | End: 2021-12-16

## 2021-04-29 ENCOUNTER — OFFICE VISIT (OUTPATIENT)
Dept: CARDIOLOGY CLINIC | Age: 66
End: 2021-04-29
Payer: MEDICARE

## 2021-04-29 VITALS
HEIGHT: 67 IN | OXYGEN SATURATION: 96 % | RESPIRATION RATE: 16 BRPM | BODY MASS INDEX: 30.45 KG/M2 | DIASTOLIC BLOOD PRESSURE: 68 MMHG | SYSTOLIC BLOOD PRESSURE: 128 MMHG | HEART RATE: 76 BPM | TEMPERATURE: 97.2 F | WEIGHT: 194 LBS

## 2021-04-29 DIAGNOSIS — I42.9 CARDIOMYOPATHY, UNSPECIFIED TYPE (HCC): Primary | ICD-10-CM

## 2021-04-29 DIAGNOSIS — I27.20 PULMONARY HTN (HCC): ICD-10-CM

## 2021-04-29 PROCEDURE — G8428 CUR MEDS NOT DOCUMENT: HCPCS | Performed by: INTERNAL MEDICINE

## 2021-04-29 PROCEDURE — G8536 NO DOC ELDER MAL SCRN: HCPCS | Performed by: INTERNAL MEDICINE

## 2021-04-29 PROCEDURE — G8417 CALC BMI ABV UP PARAM F/U: HCPCS | Performed by: INTERNAL MEDICINE

## 2021-04-29 PROCEDURE — 1090F PRES/ABSN URINE INCON ASSESS: CPT | Performed by: INTERNAL MEDICINE

## 2021-04-29 PROCEDURE — 1101F PT FALLS ASSESS-DOCD LE1/YR: CPT | Performed by: INTERNAL MEDICINE

## 2021-04-29 PROCEDURE — G8400 PT W/DXA NO RESULTS DOC: HCPCS | Performed by: INTERNAL MEDICINE

## 2021-04-29 PROCEDURE — G8752 SYS BP LESS 140: HCPCS | Performed by: INTERNAL MEDICINE

## 2021-04-29 PROCEDURE — G8510 SCR DEP NEG, NO PLAN REQD: HCPCS | Performed by: INTERNAL MEDICINE

## 2021-04-29 PROCEDURE — G8754 DIAS BP LESS 90: HCPCS | Performed by: INTERNAL MEDICINE

## 2021-04-29 PROCEDURE — 3017F COLORECTAL CA SCREEN DOC REV: CPT | Performed by: INTERNAL MEDICINE

## 2021-04-29 PROCEDURE — 99214 OFFICE O/P EST MOD 30 MIN: CPT | Performed by: INTERNAL MEDICINE

## 2021-04-29 RX ORDER — SACUBITRIL AND VALSARTAN 24; 26 MG/1; MG/1
TABLET, FILM COATED ORAL
COMMUNITY
End: 2021-11-09

## 2021-04-29 NOTE — PATIENT INSTRUCTIONS
Testing   Echo( pending auth)    **call office 3-5 days after testing is completed for results**     New Medication/Medication Changes  Stop Diovan once you receive entresto 24/26 mg in hand     **please allow 24-48 hrs for medication to be escribed to pharmacy** If you need any refills on medications please contact your pharmacy so that the request can be escribed to the provider for review.

## 2021-04-29 NOTE — PROGRESS NOTES
[facility]    Ms. Amber Langston is 74 y.o. female with a history of lung cancer, CAD, cardiomyopathy, COPD, diabetes, hypertension    Patient is here today for follow-up appointment. Since last visit, patient states that she has been having worsening dyspnea. She cannot walk more than 1-2 block without getting short of breath. She uses 3 pillows at night. She denies any swelling. She also admits that she is not watching her diet and she probably has gained about 5-10 pounds over last 6-month  She denies any chest pain or chest tightness. She denies any presyncope or syncope  Denies any nausea, vomiting, abdominal pain, fever, chills, sputum production. No hematuria or other bleeding complaints    Past Medical History:   Diagnosis Date    CAD (coronary artery disease)     S/P Mid LAD 2.5 X 23 mm XIENCE MAHAD (12/19)    [facility]     LVEF 45% (09/20) 30% (03/20) 25% (11/19)    COPD (chronic obstructive pulmonary disease) [facility]     Diabetes mellitus (HCC)     Emphysema of lung (HCC)     Esophageal ring     S/P EGD and dilation (06/20)    Hypertension     Lung [facility]        Past Surgical History:   Procedure Laterality Date    HX APPENDECTOMY      HX CORONARY STENT PLACEMENT      HX TONSILLECTOMY      HX VASCULAR ACCESS      [facility] ABDOMEN SURGERY PROC UNLISTED  1972       Current Outpatient Medications   Medication Sig    bumetanide (BUMEX) 0.5 mg tablet Take 1 Tab by mouth every other day.  clopidogreL (PLAVIX) 75 mg tab TAKE 1 TABLET EVERY DAY    valsartan (DIOVAN) 40 mg tablet TAKE 1 TABLET EVERY DAY    carvediloL (COREG) 12.5 mg tablet Take 1.5 Tabs by mouth two (2) times daily (with meals).  spironolactone (Aldactone) 25 mg tablet Take 0.5 Tabs by mouth daily.  Blood Pressure Test Kit-Large kit Take blood pressure as needed    atorvastatin (LIPITOR) 20 mg tablet Take 2 Tabs by mouth daily. (Patient taking differently: Take 20 mg by mouth daily.)    insulin glargine (LANTUS U-100 INSULIN) 100 unit/mL injection 20 Units by SubCUTAneous route daily. Indications: type 2 diabetes mellitus    budesonide (PULMICORT) 0.25 mg/2 mL nbsp by Nebulization route.  inhalational spacing device 1 Each by Does Not Apply route as needed.  insulin lispro protamin/lispro (HUMALOG MIX 75-25 KWIKPEN SC) by SubCUTAneous route Before breakfast, lunch, and dinner. Pt reported sliding scale    sacubitriL-valsartan (Entresto) 24-26 mg tablet Entresto 24 mg-26 mg tablet    aspirin 81 mg chewable tablet Take 1 Tab by mouth daily.  OTHER      No current facility-administered medications for this visit. Allergies and Sensitivities:  Allergies   Allergen Reactions    Lisinopril Cough       Family History:  No family history on file. Social History:  Social History     Tobacco Use    Smoking status: Former Smoker     Years: 24.     Quit date: 1978     Years since quittin.8    Smokeless tobacco: Never Used   Substance Use Topics    Alcohol use: No    Drug use: No     She  reports that she quit smoking about 42 years ago. She quit after 24.00 years of use. She has never used smokeless tobacco.  She  reports no history of alcohol use. Review of Systems:  Cardiac symptoms as noted above in HPI. All others negative. Physical Exam:  BP Readings from Last 3 Encounters:   21 128/68   10/29/20 (!) 146/75   20 (!) 127/53         Pulse Readings from Last 3 Encounters:   21 76   10/29/20 72   20 86          Wt Readings from Last 3 Encounters:   21 194 lb (88 kg)   10/29/20 196 lb 6.4 oz (89.1 kg)   20 166 lb (75.3 kg)       Constitutional: Oriented to person, place, and time. HENT: Head: Normocephalic and atraumatic. Neck: No JVD present. Cardiovascular: Regular rhythm.    No murmur, gallop or rubs appreciated  Lung: Breath sounds normal. No respiratory distress. No ronchi or rales appreciated  Abdominal: No tenderness. No rebound and no guarding. Musculoskeletal: There is no lower extremity edema. No cynosis    Review of Data  LABS:   Lab Results   Component Value Date/Time    Sodium 140 03/11/2020 07:30 AM    Potassium 4.4 03/11/2020 07:30 AM    Chloride 107 03/11/2020 07:30 AM    CO2 27 03/11/2020 07:30 AM    Glucose 215 (H) 03/11/2020 07:30 AM    BUN 19 (H) 03/11/2020 07:30 AM    Creatinine 0.74 03/11/2020 07:30 AM     Lipids Latest Ref Rng & Units 12/19/2019 11/27/2019   Chol, Total <200 MG/ 206(H)   HDL 40 - 60 MG/DL 51 68(H)   LDL 0 - 100 MG/DL 54.4 109. 8(H)   Trig <150 MG/ 141   Chol/HDL Ratio 0 - 5.0   2.5 3.0   Some recent data might be hidden     Lab Results   Component Value Date/Time    ALT (SGPT) 14 03/09/2020 04:16 PM     Lab Results   Component Value Date/Time    Hemoglobin A1c 10.9 (H) 03/10/2020 03:50 AM       EKG    ECHO (12/19)  Left Ventricle Normal cavity size and wall thickness. Severe systolic dysfunction. The estimated ejection fraction is 26 - 30%. Visually measured ejection fraction. Global hypokinesis observed. There is severe (grade 3) left ventricular diastolic dysfunction. Elevated left ventricular diastolic pressure. Wall Scoring The left ventricular wall motion is globally hypokinetic. ECHO (09/20)  Left Ventricle  Wall motion: normal. The estimated EF is 45 - 50%. Visually measured ejection fraction. Mildly reduced systolic function. There is mild (grade 1) left ventricular diastolic dysfunction. Wall Scoring  The left ventricular wall motion is normal.             Left Atrium  Mildly dilated left atrium. Left Atrium volume index is 35.47 mL/m2. Right Ventricle  Normal cavity size and global systolic function. Right Atrium  Mildly dilated right atrium. Aortic Valve  No stenosis. Probably trileaflet aortic valve. Mild aortic valve regurgitation. Mitral Valve  No stenosis.  Mitral valve non-specific thickening. Trace regurgitation. Tricuspid Valve  Normal valve structure and no stenosis. Mild regurgitation. Pulmonary Artery  Pulmonary arterial systolic pressure (PASP) is 24 mmHg. Pulmonary hypertension not suggested by Doppler findings. STRESS TEST (12/19)  · Baseline ECG: Normal EKG, non-specific ST-T wave abnormalities. · Gated SPECT: Left ventricular function post-stress was abnormal. Calculated ejection fraction is 30%. · Left ventricular perfusion is equivocal.  · There is small area of mildly intense reversible defect that involves distal anterior wall. This could represent mild ischemia or artifact. Clinical correlation is recommended    CATHETERIZATION (12/19)  Left Main   The vessel is large. The vessel is angiographically normal.   Left Anterior Descending   Mid 20-30% narrowing. Eccentric 70% tubular stenosis at D2 followed by eccentric 90% another tubular lesion at D3. Diagonal branches are small caliber vessels Status post PCI and stenting of mid LAD stenosis using 2.5 x 23 mm resolute MAHAD   Mid LAD lesion 85% stenosed. . Lesion is the culprit lesion. The lesion is serial. There is moderate plaque burden detected. Ramus Intermedius   Mid 50% tubular narrowing. Far distal 90-95% tubular stenosis. Left Circumflex   The vessel exhibits minimal luminal irregularities. OM: Angiographically normal. Inferior branch has 30-40% tubular lesion   Right Coronary Artery   Mid vessel 30-40% luminal irregularities. Posterolateral branch small caliber vessel with far distal long sequential 90-95% lesions (not suitable for PCI) RPDA without any obstructive disease     IMPRESSION & PLAN:  Ms. Bradley [facility] 74 y.o. female with multiple medical problem    Cardiomyopathy:  Patient has been diagnosed with severe LV dysfunction with EF 25% in December 2019. Last echo with EF of 30-35% in March 2020. LVEF 45-50% in September 2020  Currently on Diovan, spironolactone, Coreg and Bumex.   Unable to afford Entresto in past  NYHA class II. Having some worsening dyspnea. This could be multifactorial either from worsening LV dysfunction, pulmonary hypertension, COPD or weight gain  We will try to change Diovan to [facility]. Detail side effects and instruction provided to the patient  We will proceed with echocardiogram because of worsening dyspnea    CAD  Patient had non-STEMI in December 2019 during hospitalization with elevated troponin. Nuclear stress test showed perfusion defect. Cardiac cath showed CAD and she had LAD stent in December 2019  Continue aspirin lifelong. Brilinta was switched to Plavix in February 2020 because it was expensive. Continue aspirin and Plavix. Would considering discontinuing on next visit. Continue beta-blocker and statin    Hypertension:  /60 8 mmHg. Continue current medications. Planning to switch valsartan to [facility] because of LV dysfunction    Hyperlipidemia:  Started on atorvastatin 20 mg daily. Continue same. Last LDL 54    Diabetes:  Goal hemoglobin A1c less than 7 is recommended. Defer to PCP. Last A1c 10.1    Lung cancer:  Defer to primary team and heme-onc team.    This plan was discussed with patient who is in agreement. Thank you for allowing me to participate in patient care. Please feel free to call me if you have any question or concern. Anders Phillips MD  Please note: This document has been produced using voice recognition software. Unrecognized errors in transcription may be present.

## 2021-04-29 NOTE — PROGRESS NOTES
Solitario Emery presents today for   Chief Complaint   Patient presents with    Follow-up     6 monts       Solitario So preferred language for health care discussion is english/other. Personal Protective Equipment:   Personal Protective Equipment was used including: mask-surgical and hands-gloves. Patient was placed on no precaution(s). Patient was masked. Precautions:   Patient currently on None  Patient currently roomed with door closed    Is someone accompanying this pt? no    Is the patient using any DME equipment during [address]? no    Depression Screening:  3 most recent PHQ Screens 4/29/2021   Little interest or pleasure in doing things Not at all   Feeling down, depressed, irritable, or hopeless Not at all   Total Score PHQ 2 0       Learning Assessment:  No flowsheet data found. Abuse Screening:  Abuse Screening Questionnaire 10/29/2020   Do you ever feel afraid of your partner? N   Are you in a relationship with someone who physically or mentally threatens you? N   Is it safe for you to go home? Y       Fall Risk  Fall Risk Assessment, last 12 mths 4/29/2021   Able to walk? Yes   Fall in past 12 months? 0   Do you feel unsteady? 0   Are you worried about falling 0       Pt currently taking Anticoagulant therapy? yes    Coordination of Care:  1. Have you been to the ER, urgent care clinic since your last visit? Hospitalized since your last visit? no    2. Have you seen or consulted any other health care providers outside of the [address] [place] [address] since your last visit? Include any pap smears or colon screening.  no

## 2021-04-29 NOTE — LETTER
4/29/2021 Patient: Liz Harris YOB: 1955 Date of Visit: 4/29/2021 Jo Dillon MD 
[address] [id number] [address] Fax: [phone] Teodora Hensley MD 
[address] [facility] 
[address] Fax: [phone] Dear MD Teodora Rios MD, Thank you for referring Ms. Liz Harris to [address] [place] [address] for evaluation. My notes for this consultation are attached. If you have questions, please do not hesitate to call me. I look forward to following your patient along with you. Sincerely, Glenny Gama MD

## 2021-05-14 RX ORDER — BUMETANIDE 0.5 MG/1
TABLET ORAL
Qty: 90 TAB | Refills: 3 | Status: SHIPPED | OUTPATIENT
Start: 2021-05-14 | End: 2022-05-03

## 2021-06-04 RX ORDER — SPIRONOLACTONE 25 MG/1
TABLET ORAL
Qty: 45 TABLET | Refills: 4 | Status: SHIPPED | OUTPATIENT
Start: 2021-06-04 | End: 2022-07-18 | Stop reason: SDUPTHER

## 2021-07-21 ENCOUNTER — TELEPHONE (OUTPATIENT)
Dept: CARDIOLOGY CLINIC | Age: 66
End: 2021-07-21

## 2021-08-24 NOTE — TELEPHONE ENCOUNTER
Requested Prescriptions     Pending Prescriptions Disp Refills    carvediloL (COREG) 12.5 mg tablet 90 Tablet 5     Sig: Take 1.5 Tablets by mouth two (2) times daily (with meals).      Patient is going on second day of not taking this medication

## 2021-08-25 NOTE — TELEPHONE ENCOUNTER
PCP: Brynn Ahn MD    Last appt: 4/29/2021  Future Appointments   Date Time Provider Kateryna Melvin   10/28/2021  8:30 AM Berenice Donovan MD [facility] BS AMB       Requested Prescriptions     Pending Prescriptions Disp Refills    carvediloL (COREG) 12.5 mg tablet 90 Tablet 5     Sig: Take 1.5 Tablets by mouth two (2) times daily (with meals).

## 2021-08-26 RX ORDER — CARVEDILOL 12.5 MG/1
18.75 TABLET ORAL 2 TIMES DAILY WITH MEALS
Qty: 90 TABLET | Refills: 5 | Status: SHIPPED | OUTPATIENT
Start: 2021-08-26 | End: 2022-08-03

## 2021-11-09 ENCOUNTER — OFFICE VISIT (OUTPATIENT)
Dept: CARDIOLOGY CLINIC | Age: 66
End: 2021-11-09
Payer: MEDICARE

## 2021-11-09 VITALS
BODY MASS INDEX: 30.54 KG/M2 | DIASTOLIC BLOOD PRESSURE: 74 MMHG | WEIGHT: 195 LBS | SYSTOLIC BLOOD PRESSURE: 132 MMHG | HEART RATE: 86 BPM | TEMPERATURE: 97.3 F | OXYGEN SATURATION: 98 % | RESPIRATION RATE: 16 BRPM

## 2021-11-09 DIAGNOSIS — I42.9 CARDIOMYOPATHY, UNSPECIFIED TYPE (HCC): Primary | ICD-10-CM

## 2021-11-09 PROCEDURE — 3017F COLORECTAL CA SCREEN DOC REV: CPT | Performed by: INTERNAL MEDICINE

## 2021-11-09 PROCEDURE — 1101F PT FALLS ASSESS-DOCD LE1/YR: CPT | Performed by: INTERNAL MEDICINE

## 2021-11-09 PROCEDURE — 93000 ELECTROCARDIOGRAM COMPLETE: CPT | Performed by: INTERNAL MEDICINE

## 2021-11-09 PROCEDURE — G8400 PT W/DXA NO RESULTS DOC: HCPCS | Performed by: INTERNAL MEDICINE

## 2021-11-09 PROCEDURE — G8752 SYS BP LESS 140: HCPCS | Performed by: INTERNAL MEDICINE

## 2021-11-09 PROCEDURE — G8428 CUR MEDS NOT DOCUMENT: HCPCS | Performed by: INTERNAL MEDICINE

## 2021-11-09 PROCEDURE — 99214 OFFICE O/P EST MOD 30 MIN: CPT | Performed by: INTERNAL MEDICINE

## 2021-11-09 PROCEDURE — 1090F PRES/ABSN URINE INCON ASSESS: CPT | Performed by: INTERNAL MEDICINE

## 2021-11-09 PROCEDURE — G8754 DIAS BP LESS 90: HCPCS | Performed by: INTERNAL MEDICINE

## 2021-11-09 PROCEDURE — G8510 SCR DEP NEG, NO PLAN REQD: HCPCS | Performed by: INTERNAL MEDICINE

## 2021-11-09 PROCEDURE — G8536 NO DOC ELDER MAL SCRN: HCPCS | Performed by: INTERNAL MEDICINE

## 2021-11-09 PROCEDURE — G8417 CALC BMI ABV UP PARAM F/U: HCPCS | Performed by: INTERNAL MEDICINE

## 2021-11-09 RX ORDER — VALSARTAN 40 MG/1
TABLET ORAL
Qty: 90 TABLET | Refills: 3 | Status: SHIPPED | OUTPATIENT
Start: 2021-11-09 | End: 2022-02-01

## 2021-11-09 NOTE — PROGRESS NOTES
Identified pt with two pt identifiers(name and ). Reviewed record in preparation for visit and have obtained necessary documentation. William Covarrubias presents today for   Chief Complaint   Patient presents with    Follow-up     6m       William Covarrubias preferred language for health care discussion is english/other. Personal Protective Equipment:   Personal Protective Equipment was used including: mask-surgical and hands-gloves. Patient was placed on no precaution(s). Patient was masked. Precautions:   Patient currently on None  Patient currently roomed with door closed    Is someone accompanying this pt? no    Is the patient using any DME equipment during OV? no    Depression Screening:  3 most recent PHQ Screens 2021   Little interest or pleasure in doing things Not at all   Feeling down, depressed, irritable, or hopeless Not at all   Total Score PHQ 2 0       Learning Assessment:  No flowsheet data found. Abuse Screening:  Abuse Screening Questionnaire 10/29/2020   Do you ever feel afraid of your partner? N   Are you in a relationship with someone who physically or mentally threatens you? N   Is it safe for you to go home? Y       Fall Risk  Fall Risk Assessment, last 12 mths 2021   Able to walk? Yes   Fall in past 12 months? -   Do you feel unsteady? -   Are you worried about falling -       Pt currently taking Anticoagulant therapy? no    Coordination of Care:  1. Have you been to the ER, urgent care clinic since your last visit? Hospitalized since your last visit? no    2. Have you seen or consulted any other health care providers outside of the [address] [place] [address] since your last visit? Include any pap smears or colon screening. no      Please see Red banners under Allergies and Med Rec to remove outside inquires. All correct information has been verified with patient and added to chart.      Medication's patient's would liked removed has been marked not taking to be removed per Verbal order and read back per Kristin Mahajan MD

## 2021-11-09 NOTE — PROGRESS NOTES
[facility]    Ms. Rodolfo Lorenzo is 74 y.o. female with a history of lung cancer, CAD, cardiomyopathy, COPD, diabetes, hypertension    Patient is here today for follow-up appointment. Since last visit, patient states that she has been having worsening dyspnea. She cannot walk more than 1-2 block without getting short of breath. She uses 3 pillows at night. She denies any swelling. She also admits that she is not watching her diet and she probably has gained about 5-10 pounds over last 6-month  She denies any chest pain or chest tightness. She denies any presyncope or syncope  Denies any nausea, vomiting, abdominal pain, fever, chills, sputum production. No hematuria or other bleeding complaints    Past Medical History:   Diagnosis Date    CAD (coronary artery disease)     S/P Mid LAD 2.5 X 23 mm XIENCE MAHAD (12/19)    Cardiomyopathy [facility] [address]     LVEF 45% (09/20) 30% (03/20) 25% (11/19)    COPD (chronic obstructive pulmonary disease) ([facility] [facility]     Diabetes mellitus (HCC)     Emphysema of lung (HCC)     Esophageal ring     S/P EGD and dilation (06/20)    Hypertension     Lung cancer ([facility] [address]        Past Surgical History:   Procedure Laterality Date    HX APPENDECTOMY      HX CORONARY STENT PLACEMENT      HX TONSILLECTOMY      HX VASCULAR ACCESS      [facility] ABDOMEN SURGERY PROC UNLISTED  1972       Current Outpatient Medications   Medication Sig    valsartan (DIOVAN) 40 mg tablet TAKE 1 TABLET EVERY DAY    carvediloL (COREG) 12.5 mg tablet Take 1.5 Tablets by mouth two (2) times daily (with meals).  spironolactone (ALDACTONE) 25 mg tablet TAKE 1/2 TABLET EVERY DAY    bumetanide (BUMEX) 0.5 mg tablet TAKE 1 TABLET EVERY DAY    clopidogreL (PLAVIX) 75 mg tab TAKE 1 TABLET EVERY DAY    Blood Pressure Test Kit-Large kit Take blood pressure as needed    atorvastatin (LIPITOR) 20 mg tablet Take 2 Tabs by mouth daily.  (Patient taking differently: Take 20 mg by mouth daily.)    aspirin 81 mg chewable tablet Take 1 Tab by mouth daily.  insulin glargine (LANTUS U-100 INSULIN) 100 unit/mL injection 20 Units by SubCUTAneous route daily. Indications: type 2 diabetes mellitus    OTHER     budesonide (PULMICORT) 0.25 mg/2 mL nbsp by Nebulization route.  inhalational spacing device 1 Each by Does Not Apply route as needed.  insulin lispro protamin/lispro (HUMALOG MIX 75-25 KWIKPEN SC) by SubCUTAneous route Before breakfast, lunch, and dinner. Pt reported sliding scale     No current facility-administered medications for this visit. Allergies and Sensitivities:  Allergies   Allergen Reactions    Lisinopril Cough       Family History:  No family history on file. Social History:  Social History     Tobacco Use    Smoking status: Former Smoker     Years: .     Quit date: 1978     Years since quittin.3    Smokeless tobacco: Never Used   Substance Use Topics    Alcohol use: No    Drug use: No     She  reports that she quit smoking about 43 years ago. She quit after 24.00 years of use. She has never used smokeless tobacco.  She  reports no history of alcohol use. Review of Systems:  Cardiac symptoms as noted above in HPI. All others negative. Physical Exam:  BP Readings from Last 3 Encounters:   21 132/74   21 (!) 164/80   21 128/68         Pulse Readings from Last 3 Encounters:   21 86   21 76   10/29/20 72          Wt Readings from Last 3 Encounters:   21 88.5 kg (195 lb)   21 88 kg (194 lb)   21 88 kg (194 lb)       Constitutional: Oriented to person, place, and time. HENT: Head: Normocephalic and atraumatic. Neck: No JVD present. Cardiovascular: Regular rhythm. No murmur, gallop or rubs appreciated  Lung: Breath sounds normal. No respiratory distress. No ronchi or rales appreciated  Abdominal: No tenderness. No rebound and no guarding.    Musculoskeletal: There is no lower extremity edema. No cynosis    Review of Data  LABS:   Lab Results   Component Value Date/Time    Sodium 140 03/11/2020 07:30 AM    Potassium 4.4 03/11/2020 07:30 AM    Chloride 107 03/11/2020 07:30 AM    CO2 27 03/11/2020 07:30 AM    Glucose 215 (H) 03/11/2020 07:30 AM    BUN 19 (H) 03/11/2020 07:30 AM    Creatinine 0.74 03/11/2020 07:30 AM     Lipids Latest Ref Rng & Units 12/19/2019 11/27/2019   Chol, Total <200 MG/ 206(H)   HDL 40 - 60 MG/DL 51 68(H)   LDL 0 - 100 MG/DL 54.4 109. 8(H)   Trig <150 MG/ 141   Chol/HDL Ratio 0 - 5.0   2.5 3.0   Some recent data might be hidden     Lab Results   Component Value Date/Time    ALT (SGPT) 14 03/09/2020 04:16 PM     Lab Results   Component Value Date/Time    Hemoglobin A1c 10.9 (H) 03/10/2020 03:50 AM       EKG    ECHO (12/19)  Left Ventricle Normal cavity size and wall thickness. Severe systolic dysfunction. The estimated ejection fraction is 26 - 30%. Visually measured ejection fraction. Global hypokinesis observed. There is severe (grade 3) left ventricular diastolic dysfunction. Elevated left ventricular diastolic pressure. Wall Scoring The left ventricular wall motion is globally hypokinetic. ECHO (09/20)  Left Ventricle  Wall motion: normal. The estimated EF is 45 - 50%. Visually measured ejection fraction. Mildly reduced systolic function. There is mild (grade 1) left ventricular diastolic dysfunction. Wall Scoring  The left ventricular wall motion is normal.             Left Atrium  Mildly dilated left atrium. Left Atrium volume index is 35.47 mL/m2. Right Ventricle  Normal cavity size and global systolic function. Right Atrium  Mildly dilated right atrium. Aortic Valve  No stenosis. Probably trileaflet aortic valve. Mild aortic valve regurgitation. Mitral Valve  No stenosis. Mitral valve non-specific thickening. Trace regurgitation. Tricuspid Valve  Normal valve structure and no stenosis.  Mild regurgitation. Pulmonary Artery  Pulmonary arterial systolic pressure (PASP) is 24 mmHg. Pulmonary hypertension not suggested by Doppler findings. STRESS TEST (12/19)  · Baseline ECG: Normal EKG, non-specific ST-T wave abnormalities. · Gated SPECT: Left ventricular function post-stress was abnormal. Calculated ejection fraction is 30%. · Left ventricular perfusion is equivocal.  · There is small area of mildly intense reversible defect that involves distal anterior wall. This could represent mild ischemia or artifact. Clinical correlation is recommended    CATHETERIZATION (12/19)  Left Main   The vessel is large. The vessel is angiographically normal.   Left Anterior Descending   Mid 20-30% narrowing. Eccentric 70% tubular stenosis at D2 followed by eccentric 90% another tubular lesion at D3. Diagonal branches are small caliber vessels Status post PCI and stenting of mid LAD stenosis using 2.5 x 23 mm resolute MAHAD   Mid LAD lesion 85% stenosed. . Lesion is the culprit lesion. The lesion is serial. There is moderate plaque burden detected. Ramus Intermedius   Mid 50% tubular narrowing. Far distal 90-95% tubular stenosis. Left Circumflex   The vessel exhibits minimal luminal irregularities. OM: Angiographically normal. Inferior branch has 30-40% tubular lesion   Right Coronary Artery   Mid vessel 30-40% luminal irregularities. Posterolateral branch small caliber vessel with far distal long sequential 90-95% lesions (not suitable for PCI) RPDA without any obstructive disease     IMPRESSION & PLAN:  Ms. Rosie Hsu 74 y.o. female with multiple medical problem    Cardiomyopathy:  Patient has been diagnosed with severe LV dysfunction with EF 25% in December 2019. Last echo with EF of 30-35% in March 2020. LVEF 45-50% in September 2020  Currently on Diovan, spironolactone, Coreg and Bumex. Unable to afford Entresto in past  NYHA class II. Having some worsening dyspnea.   This could be multifactorial either from worsening LV dysfunction, pulmonary hypertension, COPD or weight gain  We will try to change Diovan to Karla Rasher. Detail side effects and instruction provided to the patient  We will proceed with echocardiogram because of worsening dyspnea    CAD  Patient had non-STEMI in December 2019 during hospitalization with elevated troponin. Nuclear stress test showed perfusion defect. Cardiac cath showed CAD and she had LAD stent in December 2019  Continue aspirin lifelong. Brilinta was switched to Plavix in February 2020 because it was expensive. Continue aspirin and Plavix. Would considering discontinuing on next visit. Continue beta-blocker and statin    Hypertension:  /60 8 mmHg. Continue current medications. Planning to switch valsartan to Karla Rasher because of LV dysfunction    Hyperlipidemia:  Started on atorvastatin 20 mg daily. Continue same. Last LDL 54    Diabetes:  Goal hemoglobin A1c less than 7 is recommended. Defer to PCP. Last A1c 10.1    Lung cancer:  Defer to primary team and heme-onc team.    This plan was discussed with patient who is in agreement. Thank you for allowing me to participate in patient care. Please feel free to call me if you have any question or concern. Clyde Weldon MD  Please note: This document has been produced using voice recognition software. Unrecognized errors in transcription may be present.

## 2021-12-16 RX ORDER — CLOPIDOGREL BISULFATE 75 MG/1
TABLET ORAL
Qty: 90 TABLET | Refills: 3 | Status: SHIPPED | OUTPATIENT
Start: 2021-12-16

## 2022-02-01 ENCOUNTER — OFFICE VISIT (OUTPATIENT)
Dept: CARDIOLOGY CLINIC | Age: 67
End: 2022-02-01
Payer: MEDICARE

## 2022-02-01 VITALS
WEIGHT: 197 LBS | TEMPERATURE: 98.3 F | SYSTOLIC BLOOD PRESSURE: 140 MMHG | HEART RATE: 95 BPM | RESPIRATION RATE: 16 BRPM | DIASTOLIC BLOOD PRESSURE: 78 MMHG | BODY MASS INDEX: 30.85 KG/M2 | OXYGEN SATURATION: 96 %

## 2022-02-01 DIAGNOSIS — I50.20 SYSTOLIC CONGESTIVE HEART FAILURE, UNSPECIFIED HF CHRONICITY (HCC): Primary | ICD-10-CM

## 2022-02-01 PROCEDURE — 3017F COLORECTAL CA SCREEN DOC REV: CPT | Performed by: INTERNAL MEDICINE

## 2022-02-01 PROCEDURE — 1101F PT FALLS ASSESS-DOCD LE1/YR: CPT | Performed by: INTERNAL MEDICINE

## 2022-02-01 PROCEDURE — G8417 CALC BMI ABV UP PARAM F/U: HCPCS | Performed by: INTERNAL MEDICINE

## 2022-02-01 PROCEDURE — G8754 DIAS BP LESS 90: HCPCS | Performed by: INTERNAL MEDICINE

## 2022-02-01 PROCEDURE — 99214 OFFICE O/P EST MOD 30 MIN: CPT | Performed by: INTERNAL MEDICINE

## 2022-02-01 PROCEDURE — G8428 CUR MEDS NOT DOCUMENT: HCPCS | Performed by: INTERNAL MEDICINE

## 2022-02-01 PROCEDURE — G8753 SYS BP > OR = 140: HCPCS | Performed by: INTERNAL MEDICINE

## 2022-02-01 PROCEDURE — 1090F PRES/ABSN URINE INCON ASSESS: CPT | Performed by: INTERNAL MEDICINE

## 2022-02-01 PROCEDURE — G8400 PT W/DXA NO RESULTS DOC: HCPCS | Performed by: INTERNAL MEDICINE

## 2022-02-01 PROCEDURE — G8510 SCR DEP NEG, NO PLAN REQD: HCPCS | Performed by: INTERNAL MEDICINE

## 2022-02-01 PROCEDURE — G8536 NO DOC ELDER MAL SCRN: HCPCS | Performed by: INTERNAL MEDICINE

## 2022-02-01 RX ORDER — SACUBITRIL AND VALSARTAN 24; 26 MG/1; MG/1
1 TABLET, FILM COATED ORAL 2 TIMES DAILY
Qty: 60 TABLET | Refills: 5 | Status: SHIPPED | OUTPATIENT
Start: 2022-02-01 | End: 2022-05-10

## 2022-02-01 NOTE — PATIENT INSTRUCTIONS
Testing   Echo  **call office 3-5 days after testing is completed for results**     New Medication/Medication Changes  stop diovan   Start entresto 24/26 mg twice daily     **please allow 24-48 hrs for medication to be escribed to pharmacy** If you need any refills on medications please contact your pharmacy so that the request can be escribed to the provider for review.

## 2022-02-01 NOTE — PROGRESS NOTES
Identified pt with two pt identifiers(name and ). Reviewed record in preparation for visit and have obtained necessary documentation. Luz Carter presents today for   Chief Complaint   Patient presents with    Follow-up     BNP elevated              Luz Carter preferred language for health care discussion is english/other. Personal Protective Equipment:   Personal Protective Equipment was used including: mask-surgical and hands-gloves. Patient was placed on no precaution(s). Patient was masked. Precautions:   Patient currently on None  Patient currently roomed with door closed. Is someone accompanying this pt? No   Is the patient using any DME equipment during OV? No     Depression Screening:  3 most recent PHQ Screens 2022   Little interest or pleasure in doing things Not at all   Feeling down, depressed, irritable, or hopeless Not at all   Total Score PHQ 2 0       Learning Assessment:  No flowsheet data found. Abuse Screening:  Abuse Screening Questionnaire 10/29/2020   Do you ever feel afraid of your partner? N   Are you in a relationship with someone who physically or mentally threatens you? N   Is it safe for you to go home? Y       Fall Risk  Fall Risk Assessment, last 12 mths 2022   Able to walk? Yes   Fall in past 12 months? -   Do you feel unsteady? -   Are you worried about falling -       Pt currently taking Anticoagulant therapy? No   Pt currently taking Antiplatelet therapy? no  Coordination of Care:  1. Have you been to the ER, urgent care clinic since your last visit? Hospitalized since your last visit? no    2. Have you seen or consulted any other health care providers outside of the [address] [place] [address] since your last visit? Include any pap smears or colon screening. yes      Please see Red banners under Allergies and Med Rec to remove outside inquires. All correct information has been verified with patient and added to chart.      Medication's patient's would liked removed has been marked not taking to be removed per Verbal order and read back per Sophie Briceño MD

## 2022-02-01 NOTE — PROGRESS NOTES
[facility]    Ms. Madyson Atkinson is 74 y.o. female with a history of lung cancer, CAD, cardiomyopathy, COPD, diabetes, hypertension    Patient is here today for follow-up appointment. Patient has known history of coronary artery disease. She had non-STEMI requiring mid LAD 2.5 x 23 mm Xience MAHAD in 12/2019. Patient also was diagnosed with ischemic cardiomyopathy with LVEF 25% in 11/2019    Patient is here today for follow-up appointment. She has some stable dyspnea with moderate exertion. Denies chest pain or chest tightness requiring any nitroglycerin  She had a BNP checked at heme-onc team and she was told that it was elevated so she was asked to come see me. She denies any lower extremity swelling. She has stable dyspnea. She denies presyncope syncope or PND  Denies any nausea, vomiting, abdominal pain, fever, chills, sputum production. No hematuria or other bleeding complaints    Past Medical History:   Diagnosis Date    CAD (coronary artery disease)     S/P Mid LAD 2.5 X 23 mm XIENCE MAHAD (12/19)    Cardiomyopathy ([facility] [address]     LVEF 45% (09/20) 30% (03/20) 25% (11/19)    COPD (chronic obstructive pulmonary disease) ([facility] [address]     Diabetes mellitus (HCC)     Emphysema of lung (HCC)     Esophageal ring     S/P EGD and dilation (06/20)    Hypertension     Lung cancer ([facility] [address]        Past Surgical History:   Procedure Laterality Date    HX APPENDECTOMY      HX CORONARY STENT PLACEMENT      HX TONSILLECTOMY      HX VASCULAR ACCESS      mediport    [facility] ABDOMEN SURGERY PROC UNLISTED  1972       Current Outpatient Medications   Medication Sig    clopidogreL (PLAVIX) 75 mg tab TAKE 1 TABLET EVERY DAY    valsartan (DIOVAN) 40 mg tablet TAKE 1 TABLET EVERY DAY    carvediloL (COREG) 12.5 mg tablet Take 1.5 Tablets by mouth two (2) times daily (with meals).     spironolactone (ALDACTONE) 25 mg tablet TAKE 1/2 TABLET EVERY DAY    bumetanide (BUMEX) 0.5 mg tablet TAKE 1 TABLET EVERY DAY (Patient taking differently: 0.5 mg daily as needed.)    Blood Pressure Test Kit-Large kit Take blood pressure as needed    atorvastatin (LIPITOR) 20 mg tablet Take 2 Tabs by mouth daily. (Patient taking differently: Take 20 mg by mouth daily.)    aspirin 81 mg chewable tablet Take 1 Tab by mouth daily.  insulin glargine (LANTUS U-100 INSULIN) 100 unit/mL injection 20 Units by SubCUTAneous route daily. Indications: type 2 diabetes mellitus    OTHER     inhalational spacing device 1 Each by Does Not Apply route as needed.  insulin lispro protamin/lispro (HUMALOG MIX 75-25 KWIKPEN SC) by SubCUTAneous route Before breakfast, lunch, and dinner. Pt reported sliding scale    budesonide (PULMICORT) 0.25 mg/2 mL nbsp by Nebulization route. (Patient not taking: Reported on 2022)     No current facility-administered medications for this visit. Allergies and Sensitivities:  Allergies   Allergen Reactions    Lisinopril Cough       Family History:  No family history on file. Social History:  Social History     Tobacco Use    Smoking status: Former Smoker     Years: 24.     Quit date: 1978     Years since quittin.5    Smokeless tobacco: Never Used   Substance Use Topics    Alcohol use: No    Drug use: No     She  reports that she quit smoking about 43 years ago. She quit after 24.00 years of use. She has never used smokeless tobacco.  She  reports no history of alcohol use. Review of Systems:  Cardiac symptoms as noted above in HPI. All others negative. Physical Exam:  BP Readings from Last 3 Encounters:   22 (!) 140/78   21 132/74   21 (!) 164/80         Pulse Readings from Last 3 Encounters:   22 95   21 86   21 76          Wt Readings from Last 3 Encounters:   22 89.4 kg (197 lb)   21 88.5 kg (195 lb)   21 88 kg (194 lb)       Constitutional: Oriented to person, place, and time. HENT: Head: Normocephalic and atraumatic. Neck: No JVD present. Cardiovascular: Regular rhythm. No murmur, gallop or rubs appreciated  Lung: Breath sounds normal. No respiratory distress. No ronchi or rales appreciated  Abdominal: No tenderness. No rebound and no guarding. Musculoskeletal: There is no lower extremity edema. No cynosis    Review of Data  LABS:   Lab Results   Component Value Date/Time    Sodium 140 03/11/2020 07:30 AM    Potassium 4.4 03/11/2020 07:30 AM    Chloride 107 03/11/2020 07:30 AM    CO2 27 03/11/2020 07:30 AM    Glucose 215 (H) 03/11/2020 07:30 AM    BUN 19 (H) 03/11/2020 07:30 AM    Creatinine 0.74 03/11/2020 07:30 AM     Lipids Latest Ref Rng & Units 12/19/2019 11/27/2019   Chol, Total <200 MG/ 206(H)   HDL 40 - 60 MG/DL 51 68(H)   LDL 0 - 100 MG/DL 54.4 109. 8(H)   Trig <150 MG/ 141   Chol/HDL Ratio 0 - 5.0   2.5 3.0   Some recent data might be hidden     Lab Results   Component Value Date/Time    ALT (SGPT) 14 03/09/2020 04:16 PM     Lab Results   Component Value Date/Time    Hemoglobin A1c 10.9 (H) 03/10/2020 03:50 AM       EKG    ECHO (12/19)  Left Ventricle Normal cavity size and wall thickness. Severe systolic dysfunction. The estimated ejection fraction is 26 - 30%. Visually measured ejection fraction. Global hypokinesis observed. There is severe (grade 3) left ventricular diastolic dysfunction. Elevated left ventricular diastolic pressure. Wall Scoring The left ventricular wall motion is globally hypokinetic. ECHO (09/20)  Left Ventricle  Wall motion: normal. The estimated EF is 45 - 50%. Visually measured ejection fraction. Mildly reduced systolic function. There is mild (grade 1) left ventricular diastolic dysfunction. Wall Scoring  The left ventricular wall motion is normal.               STRESS TEST (12/19)  · Baseline ECG: Normal EKG, non-specific ST-T wave abnormalities.   · Gated SPECT: Left ventricular function post-stress was abnormal. Calculated ejection fraction is 30%. · Left ventricular perfusion is equivocal.  · There is small area of mildly intense reversible defect that involves distal anterior wall. This could represent mild ischemia or artifact. Clinical correlation is recommended    CATHETERIZATION (12/19)  Left Main   The vessel is large. The vessel is angiographically normal.   Left Anterior Descending   Mid 20-30% narrowing. Eccentric 70% tubular stenosis at D2 followed by eccentric 90% another tubular lesion at D3. Diagonal branches are small caliber vessels Status post PCI and stenting of mid LAD stenosis using 2.5 x 23 mm resolute MAHAD   Mid LAD lesion 85% stenosed. . Lesion is the culprit lesion. The lesion is serial. There is moderate plaque burden detected. Ramus Intermedius   Mid 50% tubular narrowing. Far distal 90-95% tubular stenosis. Left Circumflex   The vessel exhibits minimal luminal irregularities. OM: Angiographically normal. Inferior branch has 30-40% tubular lesion   Right Coronary Artery   Mid vessel 30-40% luminal irregularities. Posterolateral branch small caliber vessel with far distal long sequential 90-95% lesions (not suitable for PCI) RPDA without any obstructive disease     IMPRESSION & PLAN:  Ms. Marli Bergman 74 y.o. female with multiple medical problem    Cardiomyopathy:  Patient has been diagnosed with severe LV dysfunction with EF 25% in December 2019. Last echo with EF of 30-35% in March 2020. LVEF 45-50% in September 2020  LVEF 50% by echo in 06/2021  Currently on Diovan, spironolactone, Coreg and Bumex. Unable to afford Entresto in past  NYHA class II. Having some worsening dyspnea. This could be multifactorial either from worsening LV dysfunction, pulmonary hypertension, COPD  We will try to change Diovan to Paula Beck.   Detail side effects and instruction provided to the patient again during this visit  We will proceed with echocardiogram because of worsening dyspnea  Recent slight elevation of BNP is likely from chronic systolic congestive heart failure. Baseline BNP is 1  and last BNP level was 350. Discussed with the patient and she was reassured. She is already on Bumex and spironolactone. No evidence of fluid overload today    CAD  Patient had non-STEMI in December 2019 during hospitalization with elevated troponin. Nuclear stress test showed perfusion defect. Cardiac cath showed CAD and she had LAD stent in December 2019  Continue aspirin lifelong. Brilinta was switched to Plavix in February 2020 because it was expensive. Continue aspirin and Plavix. Would considering discontinuing on next visit. Continue beta-blocker and statin    Hypertension:  /78mmHg. Continue current medications. Planning to switch valsartan to Kasia Nip because of LV dysfunction    Hyperlipidemia:  Started on atorvastatin 20 mg daily. Continue same. Last LDL 54    Diabetes:  Goal hemoglobin A1c less than 7 is recommended. Defer to PCP. Last A1c 10.1    Lung cancer:  Defer to primary team and heme-onc team.    This plan was discussed with patient who is in agreement. Thank you for allowing me to participate in patient care. Please feel free to call me if you have any question or concern. Karena Velazquez MD  Please note: This document has been produced using voice recognition software. Unrecognized errors in transcription may be present.

## 2022-02-01 NOTE — LETTER
2/1/2022    Patient: Adriana Urbano   YOB: 1955   Date of Visit: 2/1/2022     Krista De La Cruz MD  [address] [id number]  Via Fax: [phone]    Dear Krista De La Cruz MD,      Thank you for referring Ms. Adriana Urbano to [address] [place] [address] for evaluation. My notes for this consultation are attached. If you have questions, please do not hesitate to call me. I look forward to following your patient along with you.       Sincerely,    Megan Navarro MD

## 2022-03-18 PROBLEM — I70.90 OCCLUSION OF ARTERY: Status: ACTIVE | Noted: 2019-11-26

## 2022-03-18 PROBLEM — E11.9 DIABETES MELLITUS, TYPE 2 (HCC): Status: ACTIVE | Noted: 2019-11-26

## 2022-03-19 PROBLEM — C14.0 THROAT CANCER (HCC): Status: ACTIVE | Noted: 2020-03-09

## 2022-03-19 PROBLEM — J18.9 PNEUMONITIS: Status: ACTIVE | Noted: 2019-11-26

## 2022-03-19 PROBLEM — J98.4 PNEUMONITIS: Status: ACTIVE | Noted: 2019-11-26

## 2022-03-19 PROBLEM — R91.8 LUNG MASS: Status: ACTIVE | Noted: 2020-08-17

## 2022-03-19 PROBLEM — I50.9 CHF (CONGESTIVE HEART FAILURE) (HCC): Status: ACTIVE | Noted: 2020-03-09

## 2022-03-19 PROBLEM — I10 HTN (HYPERTENSION): Status: ACTIVE | Noted: 2019-11-26

## 2022-03-19 PROBLEM — I70.90 ARTERIAL OCCLUSION: Status: ACTIVE | Noted: 2019-11-26

## 2022-03-19 PROBLEM — E78.5 HYPERLIPIDEMIA: Status: ACTIVE | Noted: 2020-03-09

## 2022-03-19 PROBLEM — I25.10 CAD (CORONARY ARTERY DISEASE): Status: ACTIVE | Noted: 2019-12-18

## 2022-03-20 PROBLEM — C34.90 LUNG CANCER (HCC): Status: ACTIVE | Noted: 2020-03-09

## 2022-03-20 PROBLEM — J44.9 COPD (CHRONIC OBSTRUCTIVE PULMONARY DISEASE) (HCC): Status: ACTIVE | Noted: 2019-11-26

## 2022-03-20 PROBLEM — I21.4 NSTEMI (NON-ST ELEVATED MYOCARDIAL INFARCTION) (HCC): Status: ACTIVE | Noted: 2019-11-26

## 2022-03-20 PROBLEM — I82.90: Status: ACTIVE | Noted: 2019-11-26

## 2022-04-11 ENCOUNTER — TELEPHONE (OUTPATIENT)
Dept: CARDIOLOGY CLINIC | Age: 67
End: 2022-04-11

## 2022-04-19 ENCOUNTER — TELEPHONE (OUTPATIENT)
Dept: CARDIOLOGY CLINIC | Age: 67
End: 2022-04-19

## 2022-05-06 RX ORDER — BUMETANIDE 0.5 MG/1
TABLET ORAL
Qty: 90 TABLET | Refills: 3 | Status: SHIPPED | OUTPATIENT
Start: 2022-05-06

## 2022-05-10 ENCOUNTER — TELEPHONE (OUTPATIENT)
Dept: CARDIOLOGY CLINIC | Age: 67
End: 2022-05-10

## 2022-05-10 ENCOUNTER — OFFICE VISIT (OUTPATIENT)
Dept: CARDIOLOGY CLINIC | Age: 67
End: 2022-05-10
Payer: MEDICARE

## 2022-05-10 VITALS
OXYGEN SATURATION: 96 % | TEMPERATURE: 97.5 F | WEIGHT: 195.8 LBS | SYSTOLIC BLOOD PRESSURE: 116 MMHG | DIASTOLIC BLOOD PRESSURE: 70 MMHG | BODY MASS INDEX: 30.67 KG/M2 | HEART RATE: 93 BPM

## 2022-05-10 DIAGNOSIS — I50.20 SYSTOLIC CONGESTIVE HEART FAILURE, UNSPECIFIED HF CHRONICITY (HCC): ICD-10-CM

## 2022-05-10 DIAGNOSIS — I42.9 CARDIOMYOPATHY, UNSPECIFIED TYPE (HCC): Primary | ICD-10-CM

## 2022-05-10 PROCEDURE — 3017F COLORECTAL CA SCREEN DOC REV: CPT | Performed by: INTERNAL MEDICINE

## 2022-05-10 PROCEDURE — G8417 CALC BMI ABV UP PARAM F/U: HCPCS | Performed by: INTERNAL MEDICINE

## 2022-05-10 PROCEDURE — G8752 SYS BP LESS 140: HCPCS | Performed by: INTERNAL MEDICINE

## 2022-05-10 PROCEDURE — G8754 DIAS BP LESS 90: HCPCS | Performed by: INTERNAL MEDICINE

## 2022-05-10 PROCEDURE — G8400 PT W/DXA NO RESULTS DOC: HCPCS | Performed by: INTERNAL MEDICINE

## 2022-05-10 PROCEDURE — G8428 CUR MEDS NOT DOCUMENT: HCPCS | Performed by: INTERNAL MEDICINE

## 2022-05-10 PROCEDURE — G8510 SCR DEP NEG, NO PLAN REQD: HCPCS | Performed by: INTERNAL MEDICINE

## 2022-05-10 PROCEDURE — 99215 OFFICE O/P EST HI 40 MIN: CPT | Performed by: INTERNAL MEDICINE

## 2022-05-10 PROCEDURE — 1090F PRES/ABSN URINE INCON ASSESS: CPT | Performed by: INTERNAL MEDICINE

## 2022-05-10 PROCEDURE — G8536 NO DOC ELDER MAL SCRN: HCPCS | Performed by: INTERNAL MEDICINE

## 2022-05-10 PROCEDURE — 1101F PT FALLS ASSESS-DOCD LE1/YR: CPT | Performed by: INTERNAL MEDICINE

## 2022-05-10 RX ORDER — SODIUM CHLORIDE 0.9 % (FLUSH) 0.9 %
5-40 SYRINGE (ML) INJECTION EVERY 8 HOURS
Status: CANCELLED | OUTPATIENT
Start: 2022-05-10

## 2022-05-10 RX ORDER — SODIUM CHLORIDE 9 MG/ML
1000 INJECTION, SOLUTION INTRAVENOUS CONTINUOUS
Status: CANCELLED | OUTPATIENT
Start: 2022-05-10

## 2022-05-10 RX ORDER — SACUBITRIL AND VALSARTAN 49; 51 MG/1; MG/1
1 TABLET, FILM COATED ORAL 2 TIMES DAILY
COMMUNITY
End: 2022-05-10 | Stop reason: SDUPTHER

## 2022-05-10 RX ORDER — ASPIRIN 325 MG
325 TABLET ORAL DAILY
Status: CANCELLED | OUTPATIENT
Start: 2022-05-10

## 2022-05-10 RX ORDER — SACUBITRIL AND VALSARTAN 49; 51 MG/1; MG/1
1 TABLET, FILM COATED ORAL 2 TIMES DAILY
Qty: 60 TABLET | Refills: 5 | Status: SHIPPED | OUTPATIENT
Start: 2022-05-10

## 2022-05-10 RX ORDER — SODIUM CHLORIDE 0.9 % (FLUSH) 0.9 %
5-40 SYRINGE (ML) INJECTION AS NEEDED
Status: CANCELLED | OUTPATIENT
Start: 2022-05-10

## 2022-05-10 NOTE — PROGRESS NOTES
[facility]    Ms. Sabra Mitchell is 69 y.o. female with a history of lung cancer, CAD, cardiomyopathy, COPD, diabetes, hypertension    Patient is here today for follow-up appointment. Patient has known history of coronary artery disease. She had non-STEMI requiring mid LAD 2.5 x 23 mm Xience MAHAD in 12/2019. Patient also was diagnosed with ischemic cardiomyopathy with LVEF 25% in 11/2019  With medical management and further revascularization, her EF improved up to 50% in 2021. LVEF reduced to 25-30% again in 04/2022    Patient is here today for follow-up appointment. She continues to have some dyspnea on exertion however unchanged. Denies any significant lower extremity swelling. Denies presyncope or syncope. Denies any chest pain or chest tightness  Denies any nausea, vomiting, abdominal pain, fever, chills, sputum production. No hematuria or other bleeding complaints    Past Medical History:   Diagnosis Date    CAD (coronary artery disease)     S/P Mid LAD 2.5 X 23 mm XIENCE MAHAD (12/19)    Cardiomy[facility] [facility]     LVEF 45% (09/20) 30% (03/20) 25% (11/19)    COPD (chronic obstructive pulmonary disease) ([facility]     Diabetes mellitus (HCC)     Emphysema of lung (HCC)     Esophageal ring     S/P EGD and dilation (06/20)    Hypertension     Lung cancer ([facility] [address]        Past Surgical History:   Procedure Laterality Date    HX APPENDECTOMY      HX CORONARY STENT PLACEMENT      HX TONSILLECTOMY      HX VASCULAR ACCESS      mediport    IL ABDOMEN SURGERY PROC UNLISTED  1972       Current Outpatient Medications   Medication Sig    bumetanide (BUMEX) 0.5 mg tablet TAKE 1 TABLET EVERY DAY    clopidogreL (PLAVIX) 75 mg tab TAKE 1 TABLET EVERY DAY    carvediloL (COREG) 12.5 mg tablet Take 1.5 Tablets by mouth two (2) times daily (with meals).     spironolactone (ALDACTONE) 25 mg tablet TAKE 1/2 TABLET EVERY DAY    atorvastatin (LIPITOR) 20 mg tablet Take 2 Tabs by mouth daily. (Patient taking differently: Take 20 mg by mouth daily.)    aspirin 81 mg chewable tablet Take 1 Tab by mouth daily.  sacubitriL-valsartan (Entresto) 24-26 mg tablet Take 1 Tablet by mouth two (2) times a day.  Blood Pressure Test Kit-Large kit Take blood pressure as needed    insulin glargine (LANTUS U-100 INSULIN) 100 unit/mL injection 20 Units by SubCUTAneous route daily. Indications: type 2 diabetes mellitus    OTHER     budesonide (PULMICORT) 0.25 mg/2 mL nbsp by Nebulization route. (Patient not taking: Reported on 2022)    inhalational spacing device 1 Each by Does Not Apply route as needed.  insulin lispro protamin/lispro (HUMALOG MIX 75-25 KWIKPEN SC) by SubCUTAneous route Before breakfast, lunch, and dinner. Pt reported sliding scale     No current facility-administered medications for this visit. Allergies and Sensitivities:  Allergies   Allergen Reactions    Lisinopril Cough       Family History:  No family history on file. Social History:  Social History     Tobacco Use    Smoking status: Former Smoker     Years:      Quit date: 1978     Years since quittin.8    Smokeless tobacco: Never Used   Substance Use Topics    Alcohol use: No    Drug use: No     She  reports that she quit smoking about 43 years ago. She quit after 24.00 years of use. She has never used smokeless tobacco.  She  reports no history of alcohol use. Review of Systems:  Cardiac symptoms as noted above in HPI. All others negative. Physical Exam:  BP Readings from Last 3 Encounters:   05/10/22 116/70   22 (!) 141/83   22 (!) 140/78         Pulse Readings from Last 3 Encounters:   05/10/22 93   22 95   21 86          Wt Readings from Last 3 Encounters:   05/10/22 88.8 kg (195 lb 12.8 oz)   22 89.4 kg (197 lb)   22 89.4 kg (197 lb)       Constitutional: Oriented to person, place, and time.    HENT: Head: Normocephalic and atraumatic. Neck: No JVD present. Cardiovascular: Regular rhythm. No murmur, gallop or rubs appreciated  Lung: Breath sounds normal. No respiratory distress. No ronchi or rales appreciated  Abdominal: No tenderness. No rebound and no guarding. Musculoskeletal: There is no lower extremity edema. No cynosis    Review of Data  LABS:   Lab Results   Component Value Date/Time    Sodium 140 03/11/2020 07:30 AM    Potassium 4.4 03/11/2020 07:30 AM    Chloride 107 03/11/2020 07:30 AM    CO2 27 03/11/2020 07:30 AM    Glucose 215 (H) 03/11/2020 07:30 AM    BUN 19 (H) 03/11/2020 07:30 AM    Creatinine 0.74 03/11/2020 07:30 AM     Lipids Latest Ref Rng & Units 12/19/2019 11/27/2019   Chol, Total <200 MG/ 206(H)   HDL 40 - 60 MG/DL 51 68(H)   LDL 0 - 100 MG/DL 54.4 109. 8(H)   Trig <150 MG/ 141   Chol/HDL Ratio 0 - 5.0   2.5 3.0   Some recent data might be hidden     Lab Results   Component Value Date/Time    ALT (SGPT) 14 03/09/2020 04:16 PM     Lab Results   Component Value Date/Time    Hemoglobin A1c 10.9 (H) 03/10/2020 03:50 AM       EKG    ECHO (12/19)  Left Ventricle Normal cavity size and wall thickness. Severe systolic dysfunction. The estimated ejection fraction is 26 - 30%. Visually measured ejection fraction. Global hypokinesis observed. There is severe (grade 3) left ventricular diastolic dysfunction. Elevated left ventricular diastolic pressure. Wall Scoring The left ventricular wall motion is globally hypokinetic. ECHO (09/20)  Left Ventricle  Wall motion: normal. The estimated EF is 45 - 50%. Visually measured ejection fraction. Mildly reduced systolic function. There is mild (grade 1) left ventricular diastolic dysfunction. Wall Scoring  The left ventricular wall motion is normal.               STRESS TEST (12/19)  · Baseline ECG: Normal EKG, non-specific ST-T wave abnormalities.   · Gated SPECT: Left ventricular function post-stress was abnormal. Calculated ejection fraction is 30%. · Left ventricular perfusion is equivocal.  · There is small area of mildly intense reversible defect that involves distal anterior wall. This could represent mild ischemia or artifact. Clinical correlation is recommended    CATHETERIZATION (12/19)  Left Main   The vessel is large. The vessel is angiographically normal.   Left Anterior Descending   Mid 20-30% narrowing. Eccentric 70% tubular stenosis at D2 followed by eccentric 90% another tubular lesion at D3. Diagonal branches are small caliber vessels Status post PCI and stenting of mid LAD stenosis using 2.5 x 23 mm resolute MAHAD   Mid LAD lesion 85% stenosed. . Lesion is the culprit lesion. The lesion is serial. There is moderate plaque burden detected. Ramus Intermedius   Mid 50% tubular narrowing. Far distal 90-95% tubular stenosis. Left Circumflex   The vessel exhibits minimal luminal irregularities. OM: Angiographically normal. Inferior branch has 30-40% tubular lesion   Right Coronary Artery   Mid vessel 30-40% luminal irregularities. Posterolateral branch small caliber vessel with far distal long sequential 90-95% lesions (not suitable for PCI) RPDA without any obstructive disease     IMPRESSION & PLAN:  Ms. Weber Parkinson 69 y.o. female with multiple medical problem    Cardiomyopathy:  Patient has been diagnosed with severe LV dysfunction with EF 25% in December 2019. Last echo with EF of 30-35% in March 2020. LVEF 45-50% in September 2020  LVEF 50% by echo in 06/2021  LVEF 25-30% by echo in 04/2022  Patient recently started Entresto 2 weeks ago unfortunately patient is also taking valsartan. She is on spironolactone Coreg. Lasix as needed  I have asked her to stop taking valsartan as she is already on Entresto. I will increase Entresto dose to 49/51 mg twice daily. She will stop valsartan    Patient has dyspnea on exertion but no chest pain or chest tightness. Her EF is reduced significantly to 25-30%.   Considering known history of CAD and worsening EF, recommend coronary evaluation. Patient's initial symptoms at time of MI was shortness of breath. She does not recall having any chest pain or chest tightness prior to her previous cardiac catheterization. Discussed regarding management strategy which includes medical management vs. Ischemia evaluation ( non-invasive vs. Invasive). Risk, benefit and alternatives of each strategy discussed in detail. Risk, benefit, complication of LHC and possible PCI ( including but not limited to bleeding, vascular trauma requiring surgery,  infection, heart failure, stroke, MI, emergent bypass surgery, severe allergic reactions, kidney failure, dialysis and death ) were discussed with patient and willing to proceed with procedure. Will be using moderate sedation   By stating these are possible risks, this does not exclude the potential for additional risks not named here. CAD  Patient had non-STEMI in December 2019 during hospitalization with elevated troponin. Nuclear stress test showed perfusion defect. Cardiac cath showed CAD and she had LAD stent in December 2019  Continue aspirin lifelong. Brilinta was switched to Plavix in February 2020 because it was expensive. Continue aspirin and Plavix. Continue beta-blocker and statin  We will proceed with cath as mentioned above    Hypertension:  /70.currently taking Entresto, losartan, spironolactone, Coreg. As she started [place] couple weeks ago, I am going to discontinue ARB. Hyperlipidemia:  Started on atorvastatin 20 mg daily. Continue same. Last LDL 54    Diabetes:  Goal hemoglobin A1c less than 7 is recommended. Defer to PCP. Lung cancer:  Defer to primary team and heme-onc team.  Currently she is on Keytruda    This plan was discussed with patient who is in agreement. Thank you for allowing me to participate in patient care. Please feel free to call me if you have any question or concern.      Suzy Womack MD  Please note: This document has been produced using voice recognition software. Unrecognized errors in transcription may be present.

## 2022-05-10 NOTE — PATIENT INSTRUCTIONS
Instructions    Pre procedure labs(CBC, CMP, PT/INR) to be completed within 3-5 days prior to procedure. Labs drawn in [id number] [place] [address] Road. Their hours of operation are Monday through Friday 7am-3:30 pm.  If you have and questions regarding directions please contact them at [phone]. Patients Name:  Wilmer Kwok    1. You are scheduled to have a left and right heart cath on May 18, 2022 at 10:30 am. Please check in at 9 am.   2.   **YOU WILL NEED SOMEONE TO DRIVE YOU HOME AFTER PROCEDURE. 3. Please go to [facility] and park in the outpatient parking lot that is located around to the back of the [facility] and enter through the [facility] building. Once you enter through the [facility] check in with the  there. The  will either give you directions or assist you in getting to the cath holding area. 4. You are not to eat or drink anything after midnight the night before your procedure. Small sips of water to take your medications is ok. 4. If you are diabetic, do not take your insulin/sugar pill the morning of the procedure. 5. MEDICATION INSTRUCTIONS:   Please take your morning medications with the following special instructions:      []          Please make sure to take your Blood pressure medication :coreg and entresto. Hold bumex and spirinolactone. [x]          Take your Aspirin and/or Plavix/Brilinta. 6. We encourage families to wait in the waiting room on the first floor while the procedure is being done. The Doctor will come out and talk with you as soon as the procedure is over. 7. There is the possibility that you may spend the night in the hospital, depending on the results of the procedure. This will be determined after the procedure is done. If angioplasty or stent is planned, you will stay at least one day.      8. If you or your family have any questions, please call our office Monday -Friday, 9: 00 a.m.-4:30 p.m.,  At [phone], and ask to speak to one of the nurses.

## 2022-05-10 NOTE — PROGRESS NOTES
Identified pt with two pt identifiers(name and ). Reviewed record in preparation for visit and have obtained necessary documentation. Linda Rosa presents today for No chief complaint on file. Pt denies DIZZINESS, SOB, CHEST PAIN/ PRESSURE, FATIGUE/WEAKNESS, HEADACHES, SWELLING. Linda Rosa preferred language for health care discussion is english/other. Personal Protective Equipment:   Personal Protective Equipment was used including: mask-surgical and hands-gloves. Patient was placed on no precaution(s). Patient was masked. Precautions:   Patient currently on None  Patient currently roomed with door closed. Is someone accompanying this pt? No     Is the patient using any DME equipment during OV? No     Depression Screening:  3 most recent PHQ Screens 2022   Little interest or pleasure in doing things Not at all   Feeling down, depressed, irritable, or hopeless Not at all   Total Score PHQ 2 0       Learning Assessment:  No flowsheet data found. Abuse Screening:  Abuse Screening Questionnaire 10/29/2020   Do you ever feel afraid of your partner? N   Are you in a relationship with someone who physically or mentally threatens you? N   Is it safe for you to go home? Y       Fall Risk  Fall Risk Assessment, last 12 mths 2022   Able to walk? Yes   Fall in past 12 months? -   Do you feel unsteady? -   Are you worried about falling -       Pt currently taking Anticoagulant therapy? no  Pt currently taking Antiplatelet therapy? yes    Coordination of Care:  1. Have you been to the ER, urgent care clinic since your last visit? Hospitalized since your last visit?no     2. Have you seen or consulted any other health care providers outside of the [address] [place] [address] since your last visit? Include any pap smears or colon screening. No       Please see Red banners under Allergies and Med Rec to remove outside inquires.  All correct information has been verified with patient and added to chart.      Medication's patient's would liked removed has been marked not taking to be removed per Verbal order and read back per Lexus Wallace MD

## 2022-05-10 NOTE — TELEPHONE ENCOUNTER
Contacted pt at [facility] number. Two patient Identifiers confirmed. Advised pt per Dr Lien Carbajal increase entresto lakia 49/51 mg bid . Pt verbalized understanding.

## 2022-05-10 NOTE — LETTER
5/10/2022    Patient: Radha Ricks   YOB: 1955   Date of Visit: 5/10/2022     Esteban Capps MD  [address] [address]  Via Fax: [phone]    Dear Esteban Capps MD,      Thank you for referring Ms. Radha Ricks to [address] [place] [address] for evaluation. My notes for this consultation are attached. If you have questions, please do not hesitate to call me. I look forward to following your patient along with you.       Sincerely,    Barbara Kirby MD

## 2022-05-10 NOTE — TELEPHONE ENCOUNTER
PCP: Dioni Gomes MD    Last appt: 5/10/2022  Future Appointments   Date Time Provider Kateryna Melvin   6/28/2022 10:00 AM Pollo Garcia MD [facility] BS AMB       Requested Prescriptions     Pending Prescriptions Disp Refills    sacubitriL-valsartan (Entresto) 49-51 mg tab tablet 60 Tablet 5     Sig: Take 1 Tablet by mouth two (2) times a day.            Other Comments:  Verbal order and read back per Patrick Kerr MD  entresto 49/51 mg bid

## 2022-05-16 ENCOUNTER — HOSPITAL ENCOUNTER (OUTPATIENT)
Dept: LAB | Age: 67
Discharge: HOME | End: 2022-05-16
Payer: MEDICARE

## 2022-05-16 DIAGNOSIS — I50.20 SYSTOLIC CONGESTIVE HEART FAILURE, UNSPECIFIED HF CHRONICITY (HCC): ICD-10-CM

## 2022-05-16 DIAGNOSIS — I42.9 CARDIOMYOPATHY, UNSPECIFIED TYPE (HCC): ICD-10-CM

## 2022-05-16 LAB
ALBUMIN SERPL-MCNC: 2.6 G/DL (ref 3.4–5)
ALBUMIN/GLOB SERPL: 0.6 {RATIO} (ref 0.8–1.7)
ALP SERPL-CCNC: 118 U/L (ref 45–117)
ALT SERPL-CCNC: 15 U/L (ref 13–56)
ANION GAP SERPL CALC-SCNC: 5 MMOL/L (ref 3–18)
AST SERPL-CCNC: 17 U/L (ref 10–38)
BASOPHILS # BLD: 0.1 K/UL (ref 0–0.1)
BASOPHILS NFR BLD: 1 % (ref 0–2)
BILIRUB SERPL-MCNC: 0.3 MG/DL (ref 0.2–1)
BUN SERPL-MCNC: 13 MG/DL (ref 7–18)
BUN/CREAT SERPL: 13 (ref 12–20)
CALCIUM SERPL-MCNC: 8.9 MG/DL (ref 8.5–10.1)
CHLORIDE SERPL-SCNC: 106 MMOL/L (ref 100–111)
CO2 SERPL-SCNC: 29 MMOL/L (ref 21–32)
CREAT SERPL-MCNC: 0.97 MG/DL (ref 0.6–1.3)
DIFFERENTIAL METHOD BLD: ABNORMAL
EOSINOPHIL # BLD: 0.2 K/UL (ref 0–0.4)
EOSINOPHIL NFR BLD: 3 % (ref 0–5)
ERYTHROCYTE [DISTWIDTH] IN BLOOD BY AUTOMATED COUNT: 14.4 % (ref 11.6–14.5)
GLOBULIN SER CALC-MCNC: 4.3 G/DL (ref 2–4)
GLUCOSE SERPL-MCNC: 233 MG/DL (ref 74–99)
HCT VFR BLD AUTO: 28.9 % (ref 35–45)
HGB BLD-MCNC: 9.1 G/DL (ref 12–16)
IMM GRANULOCYTES # BLD AUTO: 0 K/UL (ref 0–0.04)
IMM GRANULOCYTES NFR BLD AUTO: 0 % (ref 0–0.5)
INR PPP: 1 (ref 0.8–1.2)
LYMPHOCYTES # BLD: 1.3 K/UL (ref 0.9–3.6)
LYMPHOCYTES NFR BLD: 17 % (ref 21–52)
MCH RBC QN AUTO: 26.1 PG (ref 24–34)
MCHC RBC AUTO-ENTMCNC: 31.5 G/DL (ref 31–37)
MCV RBC AUTO: 83 FL (ref 78–100)
MONOCYTES # BLD: 0.5 K/UL (ref 0.05–1.2)
MONOCYTES NFR BLD: 7 % (ref 3–10)
NEUTS SEG # BLD: 5.3 K/UL (ref 1.8–8)
NEUTS SEG NFR BLD: 72 % (ref 40–73)
NRBC # BLD: 0 K/UL (ref 0–0.01)
NRBC BLD-RTO: 0 PER 100 WBC
PLATELET # BLD AUTO: 330 K/UL (ref 135–420)
PMV BLD AUTO: 10.5 FL (ref 9.2–11.8)
POTASSIUM SERPL-SCNC: 4.2 MMOL/L (ref 3.5–5.5)
PROT SERPL-MCNC: 6.9 G/DL (ref 6.4–8.2)
PROTHROMBIN TIME: 13.5 SEC (ref 11.5–15.2)
RBC # BLD AUTO: 3.48 M/UL (ref 4.2–5.3)
SODIUM SERPL-SCNC: 140 MMOL/L (ref 136–145)
WBC # BLD AUTO: 7.4 K/UL (ref 4.6–13.2)

## 2022-05-16 PROCEDURE — 85610 PROTHROMBIN TIME: CPT

## 2022-05-16 PROCEDURE — 80053 COMPREHEN METABOLIC PANEL: CPT

## 2022-05-16 PROCEDURE — 85025 COMPLETE CBC W/AUTO DIFF WBC: CPT

## 2022-05-16 PROCEDURE — 36415 COLL VENOUS BLD VENIPUNCTURE: CPT

## 2022-05-18 ENCOUNTER — HOSPITAL ENCOUNTER (OUTPATIENT)
Age: 67
Setting detail: OUTPATIENT SURGERY
Discharge: HOME | End: 2022-05-18
Attending: INTERNAL MEDICINE | Admitting: INTERNAL MEDICINE
Payer: MEDICARE

## 2022-05-18 VITALS
HEART RATE: 85 BPM | RESPIRATION RATE: 16 BRPM | OXYGEN SATURATION: 99 % | SYSTOLIC BLOOD PRESSURE: 140 MMHG | DIASTOLIC BLOOD PRESSURE: 82 MMHG

## 2022-05-18 DIAGNOSIS — I50.20 SYSTOLIC CONGESTIVE HEART FAILURE, UNSPECIFIED HF CHRONICITY (HCC): ICD-10-CM

## 2022-05-18 DIAGNOSIS — I25.10 CORONARY ARTERY DISEASE INVOLVING NATIVE CORONARY ARTERY OF NATIVE HEART WITHOUT ANGINA PECTORIS: ICD-10-CM

## 2022-05-18 DIAGNOSIS — R06.09 DOE (DYSPNEA ON EXERTION): ICD-10-CM

## 2022-05-18 DIAGNOSIS — I25.10 CAD (CORONARY ARTERY DISEASE): ICD-10-CM

## 2022-05-18 PROCEDURE — 77030012597: Performed by: INTERNAL MEDICINE

## 2022-05-18 PROCEDURE — 93458 L HRT ARTERY/VENTRICLE ANGIO: CPT | Performed by: INTERNAL MEDICINE

## 2022-05-18 PROCEDURE — 77030015766: Performed by: INTERNAL MEDICINE

## 2022-05-18 PROCEDURE — 74011250636 HC RX REV CODE- 250/636: Performed by: INTERNAL MEDICINE

## 2022-05-18 PROCEDURE — 99152 MOD SED SAME PHYS/QHP 5/>YRS: CPT | Performed by: INTERNAL MEDICINE

## 2022-05-18 PROCEDURE — 77030029997 HC DEV COM RDL R BND TELE -B: Performed by: INTERNAL MEDICINE

## 2022-05-18 PROCEDURE — 74011000250 HC RX REV CODE- 250: Performed by: INTERNAL MEDICINE

## 2022-05-18 PROCEDURE — C1887 CATHETER, GUIDING: HCPCS | Performed by: INTERNAL MEDICINE

## 2022-05-18 PROCEDURE — 77030013797 HC KT TRNSDUC PRSSR EDWD -A: Performed by: INTERNAL MEDICINE

## 2022-05-18 PROCEDURE — 77030016699 HC CATH ANGI DX INFN1 CARD -A: Performed by: INTERNAL MEDICINE

## 2022-05-18 PROCEDURE — C1769 GUIDE WIRE: HCPCS | Performed by: INTERNAL MEDICINE

## 2022-05-18 PROCEDURE — 74011000636 HC RX REV CODE- 636: Performed by: INTERNAL MEDICINE

## 2022-05-18 PROCEDURE — 99153 MOD SED SAME PHYS/QHP EA: CPT | Performed by: INTERNAL MEDICINE

## 2022-05-18 PROCEDURE — C1894 INTRO/SHEATH, NON-LASER: HCPCS | Performed by: INTERNAL MEDICINE

## 2022-05-18 PROCEDURE — 74011250637 HC RX REV CODE- 250/637: Performed by: INTERNAL MEDICINE

## 2022-05-18 PROCEDURE — 77030013761 HC KT HRT LFT ANGI -B: Performed by: INTERNAL MEDICINE

## 2022-05-18 RX ORDER — LIDOCAINE HYDROCHLORIDE 10 MG/ML
INJECTION INFILTRATION; PERINEURAL AS NEEDED
Status: DISCONTINUED | OUTPATIENT
Start: 2022-05-18 | End: 2022-05-18 | Stop reason: HOSPADM

## 2022-05-18 RX ORDER — SODIUM CHLORIDE 0.9 % (FLUSH) 0.9 %
5-40 SYRINGE (ML) INJECTION AS NEEDED
Status: DISCONTINUED | OUTPATIENT
Start: 2022-05-18 | End: 2022-05-18 | Stop reason: HOSPADM

## 2022-05-18 RX ORDER — SODIUM CHLORIDE 0.9 % (FLUSH) 0.9 %
5-40 SYRINGE (ML) INJECTION EVERY 8 HOURS
Status: DISCONTINUED | OUTPATIENT
Start: 2022-05-18 | End: 2022-05-18 | Stop reason: HOSPADM

## 2022-05-18 RX ORDER — FENTANYL CITRATE 50 UG/ML
INJECTION, SOLUTION INTRAMUSCULAR; INTRAVENOUS AS NEEDED
Status: DISCONTINUED | OUTPATIENT
Start: 2022-05-18 | End: 2022-05-18 | Stop reason: HOSPADM

## 2022-05-18 RX ORDER — ACETAMINOPHEN 325 MG/1
650 TABLET ORAL
Status: COMPLETED | OUTPATIENT
Start: 2022-05-18 | End: 2022-05-18

## 2022-05-18 RX ORDER — SODIUM CHLORIDE 9 MG/ML
1000 INJECTION, SOLUTION INTRAVENOUS CONTINUOUS
Status: DISCONTINUED | OUTPATIENT
Start: 2022-05-18 | End: 2022-05-18 | Stop reason: HOSPADM

## 2022-05-18 RX ORDER — ASPIRIN 325 MG
325 TABLET ORAL DAILY
Status: DISCONTINUED | OUTPATIENT
Start: 2022-05-19 | End: 2022-05-18 | Stop reason: HOSPADM

## 2022-05-18 RX ORDER — MIDAZOLAM HYDROCHLORIDE 1 MG/ML
INJECTION, SOLUTION INTRAMUSCULAR; INTRAVENOUS AS NEEDED
Status: DISCONTINUED | OUTPATIENT
Start: 2022-05-18 | End: 2022-05-18 | Stop reason: HOSPADM

## 2022-05-18 RX ORDER — HEPARIN SODIUM 1000 [USP'U]/ML
INJECTION, SOLUTION INTRAVENOUS; SUBCUTANEOUS AS NEEDED
Status: DISCONTINUED | OUTPATIENT
Start: 2022-05-18 | End: 2022-05-18 | Stop reason: HOSPADM

## 2022-05-18 RX ORDER — VERAPAMIL HYDROCHLORIDE 2.5 MG/ML
INJECTION, SOLUTION INTRAVENOUS AS NEEDED
Status: DISCONTINUED | OUTPATIENT
Start: 2022-05-18 | End: 2022-05-18 | Stop reason: HOSPADM

## 2022-05-18 RX ADMIN — ACETAMINOPHEN 650 MG: 325 TABLET ORAL at 13:39

## 2022-05-18 NOTE — Clinical Note
TRANSFER - IN REPORT:     Verbal report received from: [address]. Report consisted of patient's Situation, Background, Assessment and   Recommendations(SBAR). Opportunity for questions and clarification was provided. Assessment completed upon patient's arrival to unit and care assumed. Patient transported with a Cardiac Cath Tech / Patient Care Tech.

## 2022-05-18 NOTE — H&P
Please see clinic note for detail. I saw and examined patient and confirmed above. No interval change. Labs reviewed. Procedure explained to patient and all risk and benefit discussed with patient. Risk, benefit, complication of LHC and possible PCI ( including but not limited to bleeding, infection, heart failure, stroke, MI, emergent bypass surgery, kidney failure, dialysis and death ) were discussed with patient and willing to proceed with procedure. Proceed as planned. DW patient and     History and physical has been reviewed.  There have been no significant clinical changes since the completion of the originally dated History and Physical.  Will be using moderate sedation.    ------------------------------------------------------------------------------------------------------------------

## 2022-05-18 NOTE — DISCHARGE INSTRUCTIONS

## 2022-05-18 NOTE — Clinical Note
TRANSFER - OUT REPORT:     [facility] Report consisted of patient's Situation, Background, Assessment and   Recommendations(SBAR). Opportunity for questions and clarification was provided.

## 2022-05-18 NOTE — PROGRESS NOTES
Pt c'/o 6 - 10 headache. Pt describes it as a dull pain and continuous. Verbal order given for PO tylenol by Dr Marcia Pizarro.

## 2022-06-28 ENCOUNTER — OFFICE VISIT (OUTPATIENT)
Dept: CARDIOLOGY CLINIC | Age: 67
End: 2022-06-28
Payer: MEDICARE

## 2022-06-28 VITALS
TEMPERATURE: 97.9 F | SYSTOLIC BLOOD PRESSURE: 135 MMHG | WEIGHT: 198 LBS | DIASTOLIC BLOOD PRESSURE: 64 MMHG | BODY MASS INDEX: 31.01 KG/M2 | OXYGEN SATURATION: 96 % | HEART RATE: 96 BPM | RESPIRATION RATE: 16 BRPM

## 2022-06-28 DIAGNOSIS — I50.20 SYSTOLIC CONGESTIVE HEART FAILURE, UNSPECIFIED HF CHRONICITY (HCC): Primary | ICD-10-CM

## 2022-06-28 DIAGNOSIS — I42.9 CARDIOMYOPATHY, UNSPECIFIED TYPE (HCC): ICD-10-CM

## 2022-06-28 PROCEDURE — G8536 NO DOC ELDER MAL SCRN: HCPCS | Performed by: INTERNAL MEDICINE

## 2022-06-28 PROCEDURE — G8428 CUR MEDS NOT DOCUMENT: HCPCS | Performed by: INTERNAL MEDICINE

## 2022-06-28 PROCEDURE — 3017F COLORECTAL CA SCREEN DOC REV: CPT | Performed by: INTERNAL MEDICINE

## 2022-06-28 PROCEDURE — 99214 OFFICE O/P EST MOD 30 MIN: CPT | Performed by: INTERNAL MEDICINE

## 2022-06-28 PROCEDURE — G8400 PT W/DXA NO RESULTS DOC: HCPCS | Performed by: INTERNAL MEDICINE

## 2022-06-28 PROCEDURE — G8417 CALC BMI ABV UP PARAM F/U: HCPCS | Performed by: INTERNAL MEDICINE

## 2022-06-28 PROCEDURE — G8752 SYS BP LESS 140: HCPCS | Performed by: INTERNAL MEDICINE

## 2022-06-28 PROCEDURE — 1101F PT FALLS ASSESS-DOCD LE1/YR: CPT | Performed by: INTERNAL MEDICINE

## 2022-06-28 PROCEDURE — 1090F PRES/ABSN URINE INCON ASSESS: CPT | Performed by: INTERNAL MEDICINE

## 2022-06-28 PROCEDURE — 1123F ACP DISCUSS/DSCN MKR DOCD: CPT | Performed by: INTERNAL MEDICINE

## 2022-06-28 PROCEDURE — G8754 DIAS BP LESS 90: HCPCS | Performed by: INTERNAL MEDICINE

## 2022-06-28 PROCEDURE — G8510 SCR DEP NEG, NO PLAN REQD: HCPCS | Performed by: INTERNAL MEDICINE

## 2022-06-28 NOTE — PATIENT INSTRUCTIONS
Verbal order and read back per Emmanuel Peoples MD  Low to intermediate risk for colonoscopy. Hold Plavix 75 mg five days prior to procedure.       Testing   Echo( limited in 3 m)  **call office 3-5 days after testing is completed for results**       Other Testing  BP check in 3 weeks- bring BP Diary

## 2022-06-28 NOTE — LETTER
6/28/2022    Patient: Cici Reynoso   YOB: 1955   Date of Visit: 6/28/2022     Tom Councilman, MD  [address] [address]  Via Fax: [phone]    Dear Tom Councilman, MD,      Thank you for referring Ms. Cici Reynoso to [address] [place] [address] for evaluation. My notes for this consultation are attached. If you have questions, please do not hesitate to call me. I look forward to following your patient along with you.       Sincerely,    Kiara Ortiz MD

## 2022-06-28 NOTE — PROGRESS NOTES
[facility]    Ms. Gini Vega is 69 y.o. female with a history of lung cancer, CAD, cardiomyopathy, COPD, diabetes, hypertension    Patient has known history of coronary artery disease. She had non-STEMI requiring mid LAD 2.5 x 23 mm Xience MAHAD in 12/2019. Patient also was diagnosed with ischemic cardiomyopathy with LVEF 25% in 11/2019  With medical management and further revascularization, her EF improved up to 50% in 2021. LVEF reduced to 25-30% again in 04/2022. Cardiac catheterization was performed in 06/2022 which did not show any significant obstructive coronary disease and medical management was recommended. Patient is here today for follow-up appointment for her CAD and cardiomyopathy. Patient is denying any specific cardiac complaint. She feels better since last visit. She denies any chest pain or chest tightness. She has mild dyspnea on moderate exertion, unchanged or slightly better. She denies presyncope or syncope  Denies any nausea, vomiting, abdominal pain, fever, chills, sputum production. No hematuria or other bleeding complaints    Past Medical History:   Diagnosis Date    CAD (coronary artery disease)     S/P Mid LAD 2.5 X 23 mm XIENCE MAHAD (12/19)    Cardiomyopathy ([facility] [address]     LVEF 45% (09/20) 30% (03/20) 25% (11/19)    COPD (chronic obstructive pulmonary disease) ([facility] [address]     Diabetes mellitus (HCC)     Emphysema of lung (HCC)     Esophageal ring     S/P EGD and dilation (06/20)    Hypertension     Lung cancer ([facility] [address]        Past Surgical History:   Procedure Laterality Date    HX APPENDECTOMY      HX CORONARY STENT PLACEMENT      HX TONSILLECTOMY      HX VASCULAR ACCESS      [facility] ABDOMEN SURGERY PROC UNLISTED  1972       Current Outpatient Medications   Medication Sig    sacubitriL-valsartan (Entresto) 49-51 mg tab tablet Take 1 Tablet by mouth two (2) times a day.     bumetanide (BUMEX) 0.5 mg tablet TAKE 1 TABLET EVERY DAY    clopidogreL (PLAVIX) 75 mg tab TAKE 1 TABLET EVERY DAY    carvediloL (COREG) 12.5 mg tablet Take 1.5 Tablets by mouth two (2) times daily (with meals).  spironolactone (ALDACTONE) 25 mg tablet TAKE 1/2 TABLET EVERY DAY    Blood Pressure Test Kit-Large kit Take blood pressure as needed    atorvastatin (LIPITOR) 20 mg tablet Take 2 Tabs by mouth daily. (Patient taking differently: Take 20 mg by mouth daily.)    aspirin 81 mg chewable tablet Take 1 Tab by mouth daily.  insulin glargine (LANTUS U-100 INSULIN) 100 unit/mL injection 20 Units by SubCUTAneous route daily. Indications: type 2 diabetes mellitus    OTHER     budesonide (PULMICORT) 0.25 mg/2 mL nbsp by Nebulization route.  inhalational spacing device 1 Each by Does Not Apply route as needed.  insulin lispro protamin/lispro (HUMALOG MIX 75-25 KWIKPEN SC) by SubCUTAneous route Before breakfast, lunch, and dinner. Pt reported sliding scale     No current facility-administered medications for this visit. Allergies and Sensitivities:  Allergies   Allergen Reactions    Lisinopril Cough       Family History:  No family history on file. Social History:  Social History     Tobacco Use    Smoking status: Former Smoker     Years: 24.00     Quit date: 1978     Years since quittin.0    Smokeless tobacco: Never Used   Substance Use Topics    Alcohol use: No    Drug use: No     She  reports that she quit smoking about 44 years ago. She quit after 24.00 years of use. She has never used smokeless tobacco.  She  reports no history of alcohol use. Review of Systems:  Cardiac symptoms as noted above in HPI. All others negative.     Physical Exam:  BP Readings from Last 3 Encounters:   22 135/64   22 (!) 140/82   05/10/22 116/70         Pulse Readings from Last 3 Encounters:   22 96   22 85   05/10/22 93          Wt Readings from Last 3 Encounters:   22 89.8 kg (198 lb) 05/10/22 88.8 kg (195 lb 12.8 oz)   04/12/22 89.4 kg (197 lb)       Constitutional: Oriented to person, place, and time. HENT: Head: Normocephalic and atraumatic. Neck: No JVD present. Cardiovascular: Regular rhythm. No murmur, gallop or rubs appreciated  Lung: Breath sounds normal. No respiratory distress. No ronchi or rales appreciated  Abdominal: No tenderness. No rebound and no guarding. Musculoskeletal: There is no lower extremity edema. No cynosis    Review of Data  LABS:   Lab Results   Component Value Date/Time    Sodium 140 05/16/2022 01:07 PM    Potassium 4.2 05/16/2022 01:07 PM    Chloride 106 05/16/2022 01:07 PM    CO2 29 05/16/2022 01:07 PM    Glucose 233 (H) 05/16/2022 01:07 PM    BUN 13 05/16/2022 01:07 PM    Creatinine 0.97 05/16/2022 01:07 PM     Lipids Latest Ref Rng & Units 12/19/2019 11/27/2019   Chol, Total <200 MG/ 206(H)   HDL 40 - 60 MG/DL 51 68(H)   LDL 0 - 100 MG/DL 54.4 109. 8(H)   Trig <150 MG/ 141   Chol/HDL Ratio 0 - 5.0   2.5 3.0   Some recent data might be hidden     Lab Results   Component Value Date/Time    ALT (SGPT) 15 05/16/2022 01:07 PM     Lab Results   Component Value Date/Time    Hemoglobin A1c 10.9 (H) 03/10/2020 03:50 AM       EKG    ECHO (12/19)  Left Ventricle Normal cavity size and wall thickness. Severe systolic dysfunction. The estimated ejection fraction is 26 - 30%. Visually measured ejection fraction. Global hypokinesis observed. There is severe (grade 3) left ventricular diastolic dysfunction. Elevated left ventricular diastolic pressure. Wall Scoring The left ventricular wall motion is globally hypokinetic. ECHO (09/20)  Left Ventricle  Wall motion: normal. The estimated EF is 45 - 50%. Visually measured ejection fraction. Mildly reduced systolic function. There is mild (grade 1) left ventricular diastolic dysfunction.     Wall Scoring  The left ventricular wall motion is normal.               STRESS TEST (12/19)  · Baseline ECG: Normal EKG, non-specific ST-T wave abnormalities. · Gated SPECT: Left ventricular function post-stress was abnormal. Calculated ejection fraction is 30%. · Left ventricular perfusion is equivocal.  · There is small area of mildly intense reversible defect that involves distal anterior wall. This could represent mild ischemia or artifact. Clinical correlation is recommended    CATHETERIZATION (06/2022)    CARDIAC PROCEDURE 05/18/2022 5/18/2022  Conclusion  LM: Normal  LAD: Ostial proximal 30-40% smooth narrowing, minimal in-stent restenosis of mid LAD stent otherwise normal  Ramus: Small-medium caliber, mid 60% tubular narrowing, for distal 95% tubular stenosis, unchanged  LCx/OM: Minimal luminal irregularities. Inferior branch of OM has 30-40% tubular narrowing  RCA: Small vessel, Mid vessel 30-40% luminal irregularities, small caliber RPL with mid vessel 80% stenosis  LVEDP 18 mmHg  LVEF approximately 40 to 45%    IMPRESSION & PLAN:  Ms. Willis Comment 69 y.o. female with multiple medical problem    Cardiomyopathy:  Patient has been diagnosed with severe LV dysfunction with EF 25% in December 2019. Last echo with EF of 30-35% in March 2020. LVEF 45-50% in September 2020  LVEF 50% by echo in 06/2021  LVEF 25-30% by echo in 04/2022  Currently patient is on Entresto, spironolactone, carvedilol and Bumex. No significant fluid overload on exam  I will repeat echocardiogram again in 3 months after being on Entresto spironolactone and Coreg. If no improvement in EF, AICD implantation will be considered for primary prevention. CAD  Patient had non-STEMI in December 2019 during hospitalization with elevated troponin. Nuclear stress test showed perfusion defect. Cardiac cath showed CAD and she had LAD stent in December 2019  Repeat cardiac catheterization in 06/2022 showed no significant obstructive coronary disease. Medical management was recommended. Continue aspirin lifelong.   Brilinta was switched to Plavix in February 2020 because it was expensive. Continue aspirin and Plavix. Continue beta-blocker and statin    Hypertension:  /64. Have asked patient to keep checking blood pressure at home on a daily basis and make a diary. We will have her come back in clinic in 3 weeks for blood pressure check and management. .    Hyperlipidemia:  Started on atorvastatin 20 mg daily. Continue same. Last LDL 54    Diabetes:  Goal hemoglobin A1c less than 7 is recommended. Defer to PCP. Lung cancer:  Defer to primary team and heme-onc team.  Currently she is on Keytruda    This plan was discussed with patient who is in agreement. Thank you for allowing me to participate in patient care. Please feel free to call me if you have any question or concern. Domingo Mcgee MD  Please note: This document has been produced using voice recognition software. Unrecognized errors in transcription may be present.

## 2022-06-28 NOTE — PROGRESS NOTES
Identified pt with two pt identifiers(name and ). Reviewed record in preparation for visit and have obtained necessary documentation. Kieran Koenig presents today for   Chief Complaint   Patient presents with   [facility] Cardiac Testing     post cath        Pt denied DIZZINESS, SOB, CHEST PAIN/ PRESSURE, FATIGUE/WEAKNESS, HEADACHES, SWELLING. Kieran Koenig preferred language for health care discussion is english/other. Personal Protective Equipment:   Personal Protective Equipment was used including: mask-surgical and hands-gloves. Patient was placed on no precaution(s). Patient was masked. Precautions:   Patient currently on None  Patient currently roomed with door closed. Is someone accompanying this pt? no    Is the patient using any DME equipment during [address]? no    Depression Screening:  3 most recent PHQ Screens 2022   Little interest or pleasure in doing things Not at all   Feeling down, depressed, irritable, or hopeless Not at all   Total Score PHQ 2 0       Learning Assessment:  No flowsheet data found. Abuse Screening:  Abuse Screening Questionnaire 10/29/2020   Do you ever feel afraid of your partner? N   Are you in a relationship with someone who physically or mentally threatens you? N   Is it safe for you to go home? Y       Fall Risk  Fall Risk Assessment, last 12 mths 2022   Able to walk? Yes   Fall in past 12 months? -   Do you feel unsteady? -   Are you worried about falling -       Pt currently taking Anticoagulant therapy? no  Pt currently taking Antiplatelet therapy? yes    Coordination of Care:  1. Have you been to the ER, urgent care clinic since your last visit? Hospitalized since your last visit? no    2. Have you seen or consulted any other health care providers outside of the [address] [place] [address] since your last visit? Include any pap smears or colon screening. no      Please see Red banners under Allergies and Med Rec to remove outside inquires.  All correct information has been verified with patient and added to chart.      Medication's patient's would liked removed has been marked not taking to be removed per Verbal order and read back per Rita Rawls MD

## 2022-07-18 RX ORDER — SPIRONOLACTONE 25 MG/1
TABLET ORAL
Qty: 45 TABLET | Refills: 3 | Status: SHIPPED | OUTPATIENT
Start: 2022-07-18 | End: 2022-07-20 | Stop reason: SDUPTHER

## 2022-07-18 NOTE — TELEPHONE ENCOUNTER
PCP: Marta Sandoval MD    Last appt: 7/18/2022  Future Appointments   Date Time Provider Kateryna Melvin   9/28/2022  8:00 AM [facility] ECHO 1 [facility] BS AMB   10/4/2022  8:00 AM Amanda Cross MD [facility] BS AMB       Requested Prescriptions     Pending Prescriptions Disp Refills    spironolactone (ALDACTONE) 25 mg tablet 45 Tablet 3     Sig: TAKE 1/2 TABLET EVERY DAY

## 2022-07-20 RX ORDER — SPIRONOLACTONE 25 MG/1
TABLET ORAL
Qty: 45 TABLET | Refills: 3 | Status: SHIPPED | OUTPATIENT
Start: 2022-07-20

## 2022-07-20 NOTE — TELEPHONE ENCOUNTER
PCP: Trae Meredith MD    Last appt: 7/18/2022  Future Appointments   Date Time Provider Kateryna Melvin   9/28/2022  8:00 AM [facility] ECHO 1 [facility] BS AMB   10/4/2022  8:00 AM Keysha Mehta MD [facility] BS AMB       Requested Prescriptions     Pending Prescriptions Disp Refills    spironolactone (ALDACTONE) 25 mg tablet 45 Tablet 3     Sig: TAKE 1/2 TABLET EVERY DAY           Pharmacy: patient request mail order pharmacy; Dr Janet Sapp patient

## 2022-08-01 NOTE — TELEPHONE ENCOUNTER
PCP: Benji Proctor MD    Last appt: 7/18/2022  Future Appointments   Date Time Provider Kateryna Melvin   9/28/2022  8:00 AM [facility] ECHO 1 [facility] BS AMB   10/4/2022  8:00 AM Pauline Han MD [facility] BS AMB       Requested Prescriptions     Pending Prescriptions Disp Refills    carvediloL (COREG) 12.5 mg tablet 90 Tablet 5     Sig: Take 1.5 Tablets by mouth two (2) times daily (with meals).

## 2022-08-03 RX ORDER — CARVEDILOL 12.5 MG/1
TABLET ORAL
Qty: 90 TABLET | Refills: 0 | Status: SHIPPED | OUTPATIENT
Start: 2022-08-03 | End: 2022-10-04 | Stop reason: SDUPTHER

## 2022-08-03 RX ORDER — CARVEDILOL 12.5 MG/1
18.75 TABLET ORAL 2 TIMES DAILY WITH MEALS
Qty: 180 TABLET | Refills: 1 | Status: SHIPPED | OUTPATIENT
Start: 2022-08-03 | End: 2022-10-05

## 2022-09-27 ENCOUNTER — TELEPHONE (OUTPATIENT)
Dept: CARDIOLOGY CLINIC | Age: 67
End: 2022-09-27

## 2022-09-29 ENCOUNTER — TELEPHONE (OUTPATIENT)
Dept: CARDIOLOGY CLINIC | Age: 67
End: 2022-09-29

## 2022-09-29 NOTE — TELEPHONE ENCOUNTER
Contacted pt at [facility]. Two patient Identifiers confirmed. Advised pt per Dr Yamila Mckeon. Advised pt on next appt date. Pt verbalized understanding.

## 2022-09-29 NOTE — TELEPHONE ENCOUNTER
----- Message from Roosevelt Stock MD sent at 9/28/2022  5:14 PM EDT -----  Please call the patient regarding her abnormal result. Left Ventricle: Severely reduced left ventricular systolic function with a visually estimated EF of 25 - 30%. Severe global hypokinesis present.

## 2022-10-04 ENCOUNTER — OFFICE VISIT (OUTPATIENT)
Dept: CARDIOLOGY CLINIC | Age: 67
End: 2022-10-04
Payer: MEDICARE

## 2022-10-04 VITALS
DIASTOLIC BLOOD PRESSURE: 84 MMHG | TEMPERATURE: 97.4 F | WEIGHT: 199.8 LBS | HEART RATE: 80 BPM | SYSTOLIC BLOOD PRESSURE: 138 MMHG | BODY MASS INDEX: 31.29 KG/M2

## 2022-10-04 DIAGNOSIS — I25.10 CORONARY ARTERY DISEASE DUE TO LIPID RICH PLAQUE: Primary | ICD-10-CM

## 2022-10-04 DIAGNOSIS — I10 ESSENTIAL HYPERTENSION WITH GOAL BLOOD PRESSURE LESS THAN 140/90: ICD-10-CM

## 2022-10-04 DIAGNOSIS — E78.00 PURE HYPERCHOLESTEROLEMIA: ICD-10-CM

## 2022-10-04 DIAGNOSIS — I25.83 CORONARY ARTERY DISEASE DUE TO LIPID RICH PLAQUE: Primary | ICD-10-CM

## 2022-10-04 DIAGNOSIS — I25.5 ISCHEMIC CARDIOMYOPATHY: ICD-10-CM

## 2022-10-04 PROCEDURE — G8428 CUR MEDS NOT DOCUMENT: HCPCS | Performed by: INTERNAL MEDICINE

## 2022-10-04 PROCEDURE — 99214 OFFICE O/P EST MOD 30 MIN: CPT | Performed by: INTERNAL MEDICINE

## 2022-10-04 PROCEDURE — 1123F ACP DISCUSS/DSCN MKR DOCD: CPT | Performed by: INTERNAL MEDICINE

## 2022-10-04 PROCEDURE — 1101F PT FALLS ASSESS-DOCD LE1/YR: CPT | Performed by: INTERNAL MEDICINE

## 2022-10-04 PROCEDURE — G8754 DIAS BP LESS 90: HCPCS | Performed by: INTERNAL MEDICINE

## 2022-10-04 PROCEDURE — G8400 PT W/DXA NO RESULTS DOC: HCPCS | Performed by: INTERNAL MEDICINE

## 2022-10-04 PROCEDURE — 3017F COLORECTAL CA SCREEN DOC REV: CPT | Performed by: INTERNAL MEDICINE

## 2022-10-04 PROCEDURE — G8417 CALC BMI ABV UP PARAM F/U: HCPCS | Performed by: INTERNAL MEDICINE

## 2022-10-04 PROCEDURE — G8536 NO DOC ELDER MAL SCRN: HCPCS | Performed by: INTERNAL MEDICINE

## 2022-10-04 PROCEDURE — G8432 DEP SCR NOT DOC, RNG: HCPCS | Performed by: INTERNAL MEDICINE

## 2022-10-04 PROCEDURE — 1090F PRES/ABSN URINE INCON ASSESS: CPT | Performed by: INTERNAL MEDICINE

## 2022-10-04 PROCEDURE — G8752 SYS BP LESS 140: HCPCS | Performed by: INTERNAL MEDICINE

## 2022-10-04 NOTE — PROGRESS NOTES
[facility]    Ms. Shirin Noel is 69 y.o. female with a history of lung cancer, CAD, cardiomyopathy, COPD, diabetes, hypertension    Patient has known history of coronary artery disease. She had non-STEMI requiring mid LAD 2.5 x 23 mm Xience MAHAD in 12/2019. Patient also was diagnosed with ischemic cardiomyopathy with LVEF 25% in 11/2019  With medical management and further revascularization, her EF improved up to 50% in 2021. LVEF reduced to 25-30% again in 04/2022. Cardiac catheterization was performed in 06/2022 which did not show any significant obstructive coronary disease and medical management was recommended. Repeat echo in 09/2022 showed LVEF 20-25%    Patient is here today for follow-up appointment for her CAD and cardiomyopathy. Patient is denying any specific cardiac complaint. She is walking up to 4000 steps a day. She uses 2 pillows at night. She has no swelling. She denies any palpitation, presyncope or syncope. Denies any nausea, vomiting, abdominal pain, fever, chills, sputum production. No hematuria or other bleeding complaints    Past Medical History:   Diagnosis Date    CAD (coronary artery disease)     S/P Mid LAD 2.5 X 23 mm XIENCE MAHAD (12/19)    Cardiomyopathy ([facility] [address]     LVEF 45% (09/20) 30% (03/20) 25% (11/19)    COPD (chronic obstructive pulmonary disease) (HCC)     Diabetes mellitus (HCC)     Emphysema of lung (HCC)     Esophageal ring     S/P EGD and dilation (06/20)    Hypertension     Lung cancer ([facility] [address]        Past Surgical History:   Procedure Laterality Date    HX APPENDECTOMY      HX CORONARY STENT PLACEMENT      HX TONSILLECTOMY      HX VASCULAR ACCESS      [facility]    [place] ABDOMEN SURGERY PROC UNLISTED  1972       Current Outpatient Medications   Medication Sig    carvediloL (COREG) 12.5 mg tablet Take 1.5 Tablets by mouth two (2) times daily (with meals).     spironolactone (ALDACTONE) 25 mg tablet TAKE 1/2 TABLET EVERY DAY    sacubitriL-valsartan (Entresto) 49-51 mg tab tablet Take 1 Tablet by mouth two (2) times a day. bumetanide (BUMEX) 0.5 mg tablet TAKE 1 TABLET EVERY DAY    clopidogreL (PLAVIX) 75 mg tab TAKE 1 TABLET EVERY DAY    atorvastatin (LIPITOR) 20 mg tablet Take 2 Tabs by mouth daily. aspirin 81 mg chewable tablet Take 1 Tab by mouth daily. insulin glargine (LANTUS U-100 INSULIN) 100 unit/mL injection 20 Units by SubCUTAneous route daily. Indications: type 2 diabetes mellitus    OTHER     budesonide (PULMICORT) 0.25 mg/2 mL nbsp by Nebulization route. insulin lispro protamin/lispro (HUMALOG MIX 75-25 KWIKPEN SC) by SubCUTAneous route Before breakfast, lunch, and dinner. Pt reported sliding scale     No current facility-administered medications for this visit. Allergies and Sensitivities:  Allergies   Allergen Reactions    Lisinopril Cough       Family History:  No family history on file. Social History:  Social History     Tobacco Use    Smoking status: Former     Years: 24.00     Types: Cigarettes     Quit date: 1978     Years since quittin.2    Smokeless tobacco: Never   Substance Use Topics    Alcohol use: No    Drug use: No     She  reports that she quit smoking about 44 years ago. Her smoking use included cigarettes. She has never used smokeless tobacco.  She  reports no history of alcohol use. Review of Systems:  Cardiac symptoms as noted above in HPI. All others negative. Physical Exam:  BP Readings from Last 3 Encounters:   10/04/22 138/84   22 (!) 162/90   22 135/64         Pulse Readings from Last 3 Encounters:   10/04/22 80   22 96   22 85          Wt Readings from Last 3 Encounters:   10/04/22 90.6 kg (199 lb 12.8 oz)   22 87.1 kg (192 lb)   22 89.8 kg (198 lb)       Constitutional: Oriented to person, place, and time. HENT: Head: Normocephalic and atraumatic. Neck: No JVD present. Cardiovascular: Regular rhythm. No murmur, gallop or rubs appreciated  Lung: Breath sounds normal. No respiratory distress. No ronchi or rales appreciated  Abdominal: No tenderness. No rebound and no guarding. Musculoskeletal: There is no lower extremity edema. No cynosis    Review of Data  LABS:   Lab Results   Component Value Date/Time    Sodium 140 05/16/2022 01:07 PM    Potassium 4.2 05/16/2022 01:07 PM    Chloride 106 05/16/2022 01:07 PM    CO2 29 05/16/2022 01:07 PM    Glucose 233 (H) 05/16/2022 01:07 PM    BUN 13 05/16/2022 01:07 PM    Creatinine 0.97 05/16/2022 01:07 PM     Lipids Latest Ref Rng & Units 12/19/2019 11/27/2019   Chol, Total <200 MG/ 206(H)   HDL 40 - 60 MG/DL 51 68(H)   LDL 0 - 100 MG/DL 54.4 109. 8(H)   Trig <150 MG/ 141   Chol/HDL Ratio 0 - 5.0   2.5 3.0   Some recent data might be hidden     Lab Results   Component Value Date/Time    ALT (SGPT) 15 05/16/2022 01:07 PM     Lab Results   Component Value Date/Time    Hemoglobin A1c 10.9 (H) 03/10/2020 03:50 AM       EKG    ECHO (12/19)  Left Ventricle Normal cavity size and wall thickness. Severe systolic dysfunction. The estimated ejection fraction is 26 - 30%. Visually measured ejection fraction. Global hypokinesis observed. There is severe (grade 3) left ventricular diastolic dysfunction. Elevated left ventricular diastolic pressure. Wall Scoring The left ventricular wall motion is globally hypokinetic. ECHO (09/20)  Left Ventricle  Wall motion: normal. The estimated EF is 45 - 50%. Visually measured ejection fraction. Mildly reduced systolic function. There is mild (grade 1) left ventricular diastolic dysfunction. Wall Scoring  The left ventricular wall motion is normal.               ECHO ADULT FOLLOW-UP OR LIMITED 09/28/2022 9/28/2022  Interpretation Summary    Left Ventricle: Severely reduced left ventricular systolic function with a visually estimated EF of 25 - 30%. Severe global hypokinesis present. Aortic Valve: Tricuspid valve. Mild sclerosis of the aortic valve cusp. Mild regurgitation. No stenosis. Mitral Valve: Mildly thickened leaflet. Mild to moderate regurgitation. No stenosis noted. Tricuspid Valve: Trace regurgitation. No stenosis noted. CARDIAC PROCEDURE 05/18/2022 5/18/2022  Conclusion  LM: Normal  LAD: Ostial proximal 30-40% smooth narrowing, minimal in-stent restenosis of mid LAD stent otherwise normal  Ramus: Small-medium caliber, mid 60% tubular narrowing, for distal 95% tubular stenosis, unchanged  LCx/OM: Minimal luminal irregularities. Inferior branch of OM has 30-40% tubular narrowing  RCA: Small vessel, Mid vessel 30-40% luminal irregularities, small caliber RPL with mid vessel 80% stenosis  LVEDP 18 mmHg  LVEF approximately 40 to 45%    IMPRESSION & PLAN:  Ms. Umm Hanson 69 y.o. female with multiple medical problem    Cardiomyopathy:  Patient has been diagnosed with severe LV dysfunction with EF 25% in December 2019. Last echo with EF of 30-35% in March 2020. LVEF 45-50% in September 2020  LVEF 25-30% by echo in 04/2022  ECHO in 09/2022 with LVEF 25-30%    I will try to reach out to heme-onc team to see if her LV dysfunction could be from her use of chemotherapy agent for her lung cancer in the past.  Currently patient is on Entresto, spironolactone, carvedilol and Bumex. No significant fluid overload on exam  I will increase spironolactone to 25 mg daily and then use Bumex only as needed. This was discussed with the patient. I also discussed with patient using SGLT2 inhibitor for her diabetes and cardiomyopathy. Norman or Jardiance discussed. Patient has follow-up appointment with PCP in couple weeks and would like to discuss with PCP first before considering that medications. AICD procedure and complication associated with procedure discussed in detail.    Risks include but are not limited to acute and chronic pain, infection, bleeding, deep venous thrombosis with chronic swelling of extremity, pulmonary embolism, anesthesia reactions, pneumothorax, hemothorax, emergent open heart surgery, need for repeat surgery to replace/reposition leads, and death. There can be a prolonged hospitalization with brain damage and reduced or compromised long term mobility. By stating these are possible risks, this does not exclude the potential for additional risks not named here. She is leaning towards getting it done. We will proceed with AICD implantation for primary prevention. She will discuss with significant other meanwhile. CAD  Patient had non-STEMI in December 2019 during hospitalization with elevated troponin. Nuclear stress test showed perfusion defect. Cardiac cath showed CAD and she had LAD stent in December 2019  Repeat cardiac catheterization in 06/2022 showed no significant obstructive coronary disease. Medical management was recommended. Continue aspirin lifelong. Brilinta was switched to Plavix in February 2020 because it was expensive. Continue aspirin and Plavix. Continue beta-blocker and statin    Hypertension: /84. I will increase spironolactone to 25 mg daily. We will change Bumex only as needed. Continue rest of the medications. Hyperlipidemia: Started on atorvastatin 20 mg daily. Continue same. Last LDL 54    Diabetes: Goal hemoglobin A1c less than 7 is recommended. Defer to PCP. Lung cancer: Defer to primary team and heme-onc team.  Currently she is on Keytruda    This plan was discussed with patient who is in agreement. Thank you for allowing me to participate in patient care. Please feel free to call me if you have any question or concern. Ric Quiros MD  Please note: This document has been produced using voice recognition software. Unrecognized errors in transcription may be present.

## 2022-10-04 NOTE — PROGRESS NOTES
Identified pt with two pt identifiers(name and ). Reviewed record in preparation for visit and have obtained necessary documentation. Lucero Connelly presents today for   Chief Complaint   Patient presents with    Follow-up       Pt denies  DIZZINESS, SOB, CHEST PAIN/ PRESSURE, FATIGUE/WEAKNESS, HEADACHES, SWELLING. Lucero Connelly preferred language for health care discussion is english/other. Personal Protective Equipment:   Personal Protective Equipment was used including: mask-surgical and hands-gloves. Patient was placed on no precaution(s). Patient was masked. Precautions:   Patient currently on None  Patient currently roomed with door closed. Is someone accompanying this pt? no    Is the patient using any DME equipment during [address]? no    Depression Screening:  3 most recent PHQ Screens 2022   Little interest or pleasure in doing things Not at all   Feeling down, depressed, irritable, or hopeless Not at all   Total Score PHQ 2 0       Learning Assessment:  No flowsheet data found. Abuse Screening:  Abuse Screening Questionnaire 10/29/2020   Do you ever feel afraid of your partner? N   Are you in a relationship with someone who physically or mentally threatens you? N   Is it safe for you to go home? Y       Fall Risk  Fall Risk Assessment, last 12 mths 2022   Able to walk? Yes   Fall in past 12 months? -   Do you feel unsteady? -   Are you worried about falling -       Pt currently taking Anticoagulant therapy? no  Pt currently taking Antiplatelet therapy? yes    Coordination of Care:  1. Have you been to the ER, urgent care clinic since your last visit? Hospitalized since your last visit? No     2. Have you seen or consulted any other health care providers outside of the [address] [place] [address] since your last visit? Include any pap smears or colon screening. Yes       Please see Red banners under Allergies and Med Rec to remove outside inquires.  All correct information has been verified with patient and added to chart.      Medication's patient's would liked removed has been marked not taking to be removed per Verbal order and read back per Etter Lundborg, MD

## 2022-10-04 NOTE — LETTER
10/4/2022    Patient: Joshua Jones   YOB: 1955   Date of Visit: 10/4/2022     Nicki Soni MD  [address] [address]  Via Fax: [phone]     Paul Tsang MD  [address] [address]  Via Fax: [phone]    Dear MD Paul Nieto MD,      Thank you for referring Ms. Joshua Jones to [address] [place] [address] for evaluation. My notes for this consultation are attached. If you have questions, please do not hesitate to call me. I look forward to following your patient along with you.       Sincerely,    Gato Beebe MD [[email or web address]] : After discussing various treatment options with the patient including but not limited to oral medications, physical therapy, exercise, modalities as well as interventional spinal injections, we have decided with the following plan:\par \par 1) Intervention Injection Therapy:\par I personally reviewed the MRI/CT scan images and agree with the radiologist's report. The radiological findings were discussed with the patient.\par The risks, benefits, contents and alternatives to injection were explained in full to the patient. Risks outlined include but are not limited to infection,sepsis, bleeding, post-dural puncture headache, nerve damage, temporary increase in pain, syncopal episode, failure to resolve symptoms, allergic reaction, symptom recurrence, and elevation of blood sugar in diabetics. Cortisone may cause immunosuppression. Patient understands the risks. All questions were answered. After discussion of options, patient requested an injection. Information regarding the injection was given to the patient. Which medications to stop prior to the injection was explained to the patient as well.\par \par Follow up in 1-2 weeks post injection for re-evaluation. \par Continue Home exercises, stretching, activity modification, physical therapy, and conservative care.\par \par SCS trial\par will get psych evaluation prior. \par She has filed numerous conservative therapies. Has pain despite surgical intervention. \par

## 2022-10-04 NOTE — PATIENT INSTRUCTIONS
New Medication/Medication Changes  Take aldactone 25 mg tab daily   Bumex as needed daily     **please allow 24-48 hrs for medication to be escribed to pharmacy** If you need any refills on medications please contact your pharmacy so that the request can be escribed to the provider for review.       Other   Information  on AICD

## 2022-10-05 RX ORDER — CARVEDILOL 12.5 MG/1
TABLET ORAL
Qty: 270 TABLET | Refills: 2 | Status: SHIPPED | OUTPATIENT
Start: 2022-10-05

## 2022-12-12 ENCOUNTER — TELEPHONE (OUTPATIENT)
Dept: CARDIOLOGY CLINIC | Age: 67
End: 2022-12-12

## 2022-12-12 NOTE — TELEPHONE ENCOUNTER
Patient called 12- to see if she can get a sooner appt with lawrence garcia due to her being seen at the ER for Breathing issues. Lawrence Amaro have no appts that on the dates that's she requesting. Patient is asking for a call back.  Thank you

## 2022-12-12 NOTE — TELEPHONE ENCOUNTER
Contacted pt at [facility] number, Two patient Identifiers confirmed. Informed pt soonest available was 01/03/2023. Pt verbalized understanding and scheduled for that date. Informed pt we would put her on the wait list and one an appt became available we would call to schedule.

## 2022-12-20 ENCOUNTER — TELEPHONE (OUTPATIENT)
Dept: CARDIOLOGY CLINIC | Age: 67
End: 2022-12-20

## 2022-12-20 NOTE — TELEPHONE ENCOUNTER
Incoming call from pt, two pt identifiers confirmed. Pt states she has been out of her Entresto for 3 days and it is too expensive for her to  from the pharmacy. Pt asking about coupons or alternate medications. Pt expressed concern over not taking her medication for multiple days. Will review with provider and advise. Pt verbalized understanding.

## 2022-12-20 NOTE — TELEPHONE ENCOUNTER
Contacted pt at [facility]. Two patient Identifiers confirmed. Advised pt per Dr Flores Carlos. Pt verbalized understanding.

## 2022-12-20 NOTE — TELEPHONE ENCOUNTER
Verbal order and read back per Jeanmarie Hensley MD  May take Diovan 40 mg daily in place of entresto until out of donut hole

## 2022-12-22 ENCOUNTER — TELEPHONE (OUTPATIENT)
Dept: CARDIOLOGY CLINIC | Age: 67
End: 2022-12-22

## 2022-12-22 RX ORDER — VALSARTAN 40 MG/1
40 TABLET ORAL DAILY
COMMUNITY

## 2022-12-22 NOTE — TELEPHONE ENCOUNTER
Contacted pt at home #. Two patient Identifiers confirmed. PT stated that a Rx of Diovan was suppose to be sent In to the pharmacy in place of entresto until PT comes out of [facility]. PT stated she was very frustrated because she spoke to Marylou Lagunas 12 days ago and stated she called the medication in for her. PT stated she went to the pharmacy last night and the Rx was still not there. I contacted [address] [place] [address] to give verbal Rx of Diovan 40 mg daily # 30 Refills 5 per Dr. Beverley Martinez. Advised PT she is able to pick med up today. Advised to call the office if any further concerns or problems. PT verbally understanding.

## 2022-12-27 RX ORDER — VALSARTAN 40 MG/1
40 TABLET ORAL DAILY
Qty: 30 TABLET | Refills: 0 | Status: SHIPPED | OUTPATIENT
Start: 2022-12-27

## 2023-01-03 ENCOUNTER — OFFICE VISIT (OUTPATIENT)
Dept: CARDIOLOGY CLINIC | Age: 68
End: 2023-01-03
Payer: MEDICARE

## 2023-01-03 VITALS
DIASTOLIC BLOOD PRESSURE: 62 MMHG | HEART RATE: 91 BPM | WEIGHT: 192 LBS | TEMPERATURE: 98.4 F | OXYGEN SATURATION: 99 % | SYSTOLIC BLOOD PRESSURE: 148 MMHG | BODY MASS INDEX: 30.07 KG/M2

## 2023-01-03 DIAGNOSIS — E78.00 PURE HYPERCHOLESTEROLEMIA: ICD-10-CM

## 2023-01-03 DIAGNOSIS — I25.83 CORONARY ARTERY DISEASE DUE TO LIPID RICH PLAQUE: Primary | ICD-10-CM

## 2023-01-03 DIAGNOSIS — I10 ESSENTIAL HYPERTENSION WITH GOAL BLOOD PRESSURE LESS THAN 140/90: ICD-10-CM

## 2023-01-03 DIAGNOSIS — I25.10 CORONARY ARTERY DISEASE DUE TO LIPID RICH PLAQUE: Primary | ICD-10-CM

## 2023-01-03 DIAGNOSIS — I25.5 ISCHEMIC CARDIOMYOPATHY: ICD-10-CM

## 2023-01-03 PROCEDURE — G8510 SCR DEP NEG, NO PLAN REQD: HCPCS | Performed by: INTERNAL MEDICINE

## 2023-01-03 PROCEDURE — 1123F ACP DISCUSS/DSCN MKR DOCD: CPT | Performed by: INTERNAL MEDICINE

## 2023-01-03 PROCEDURE — 3017F COLORECTAL CA SCREEN DOC REV: CPT | Performed by: INTERNAL MEDICINE

## 2023-01-03 PROCEDURE — 3078F DIAST BP <80 MM HG: CPT | Performed by: INTERNAL MEDICINE

## 2023-01-03 PROCEDURE — G8400 PT W/DXA NO RESULTS DOC: HCPCS | Performed by: INTERNAL MEDICINE

## 2023-01-03 PROCEDURE — 99214 OFFICE O/P EST MOD 30 MIN: CPT | Performed by: INTERNAL MEDICINE

## 2023-01-03 PROCEDURE — 1101F PT FALLS ASSESS-DOCD LE1/YR: CPT | Performed by: INTERNAL MEDICINE

## 2023-01-03 PROCEDURE — 3077F SYST BP >= 140 MM HG: CPT | Performed by: INTERNAL MEDICINE

## 2023-01-03 PROCEDURE — 1090F PRES/ABSN URINE INCON ASSESS: CPT | Performed by: INTERNAL MEDICINE

## 2023-01-03 PROCEDURE — G8417 CALC BMI ABV UP PARAM F/U: HCPCS | Performed by: INTERNAL MEDICINE

## 2023-01-03 PROCEDURE — G8536 NO DOC ELDER MAL SCRN: HCPCS | Performed by: INTERNAL MEDICINE

## 2023-01-03 PROCEDURE — G8428 CUR MEDS NOT DOCUMENT: HCPCS | Performed by: INTERNAL MEDICINE

## 2023-01-03 RX ORDER — VALSARTAN 40 MG/1
TABLET ORAL DAILY
COMMUNITY

## 2023-01-03 RX ORDER — ASPIRIN 81 MG/1
81 TABLET ORAL DAILY
Qty: 90 TABLET | Refills: 3 | Status: SHIPPED | OUTPATIENT
Start: 2023-01-03

## 2023-01-03 NOTE — PATIENT INSTRUCTIONS
New Medication/Medication Changes  Start aspirin 81 mg daily   Farxiga 10 mg daily     **please allow 24-48 hrs for medication to be escribed to pharmacy** If you need any refills on medications please contact your pharmacy so that the request can be escribed to the provider for review seven to 10 days prior to being out of medication.     Other Testing  AICD appt with rosalia Fabian Pt declined at this time    New Location Address- projected for the month of February 2023    [place] [address], [place] [address]

## 2023-01-03 NOTE — LETTER
1/3/2023    Patient: Manav Martinez   YOB: 1955   Date of Visit: 1/3/2023     Katelynn Ellington MD  [address] [address]  Via Fax: [phone]    Dear Katelynn Ellington MD,      Thank you for referring Ms. Manav Martinez to [address] [place] [address] for evaluation. My notes for this consultation are attached. If you have questions, please do not hesitate to call me. I look forward to following your patient along with you.       Sincerely,    Margo Blanco MD

## 2023-01-03 NOTE — PROGRESS NOTES
Identified pt with two pt identifiers(name and ). Reviewed record in preparation for visit and have obtained necessary documentation. Den Kruse presents today for   Chief Complaint   Patient presents with    Hospital Follow Up     ER for SOB       Pt c/o SOB             Den Kruse preferred language for health care discussion is english/other. Personal Protective Equipment:   Personal Protective Equipment was used including: mask-surgical and hands-gloves. Patient was placed on no precaution(s). Patient was masked. Precautions:   Patient currently on None  Patient currently roomed with door closed. Is someone accompanying this pt? no    Is the patient using any DME equipment during [address]? no    Depression Screening:  3 most recent PHQ Screens 1/3/2023   Little interest or pleasure in doing things Not at all   Feeling down, depressed, irritable, or hopeless Not at all   Total Score PHQ 2 0       Learning Assessment:  No flowsheet data found. Abuse Screening:  Abuse Screening Questionnaire 10/29/2020   Do you ever feel afraid of your partner? N   Are you in a relationship with someone who physically or mentally threatens you? N   Is it safe for you to go home? Y       Fall Risk  Fall Risk Assessment, last 12 mths 1/3/2023   Able to walk? Yes   Fall in past 12 months? -   Do you feel unsteady? -   Are you worried about falling -       Pt currently taking Anticoagulant therapy? yes  Pt currently taking Antiplatelet therapy? yes    Coordination of Care:  1. Have you been to the ER, urgent care clinic since your last visit? Hospitalized since your last visit? no    2. Have you seen or consulted any other health care providers outside of the [address] [place] [address] since your last visit? Include any pap smears or colon screening. no      Please see Red banners under Allergies and Med Rec to remove outside inquires. All correct information has been verified with patient and added to chart. Medication's patient's would liked removed has been marked not taking to be removed per Verbal order and read back per Yenny Lozano MD

## 2023-01-03 NOTE — PROGRESS NOTES
[facility]    Ms. Jackie Perez is 69 y.o. female with a history of lung cancer, CAD, cardiomyopathy, COPD, diabetes, hypertension    Patient has known history of coronary artery disease. She had non-STEMI requiring mid LAD 2.5 x 23 mm Xience MAHAD in 12/2019. Patient also was diagnosed with ischemic cardiomyopathy with LVEF 25% in 11/2019  With medical management and further revascularization, her EF improved up to 50% in 2021. LVEF reduced to 25-30% again in 04/2022. Cardiac catheterization was performed in 06/2022 which did not show any significant obstructive coronary disease and medical management was recommended. Repeat echo in 09/2022 showed LVEF 20-25%. Hospital admission in 12/2022 with shortness of breath. Diagnosed with right-sided PE and pulmonary edema by CT scan in 12/2022    Patient is here today for follow-up appointment for her CAD and cardiomyopathy. Was admitted to the hospital with new diagnosis of PE and pulmonary congestion by CT scan. Now on Eliquis. Not taking aspirin or Plavix. Denies any significant chest pain or chest tightness. Shortness of breath has improved. She is taking her medication as prescribed. 1 pillow orthopnea.     Past Medical History:   Diagnosis Date    CAD (coronary artery disease)     S/P Mid LAD 2.5 X 23 mm XIENCE MAHAD (12/19)    Cardiomyopathy ([address]     LVEF 45% (09/20) 30% (03/20) 25% (11/19)    COPD (chronic obstructive pulmonary disease) (HCC)     Diabetes mellitus (HCC)     Emphysema of lung (HCC)     Esophageal ring     S/P EGD and dilation (06/20)    Hypertension     Lung cancer ([address]        Past Surgical History:   Procedure Laterality Date    HX APPENDECTOMY      HX CORONARY STENT PLACEMENT      HX TONSILLECTOMY      HX VASCULAR ACCESS      mediport    AR UNLISTED PROCEDURE ABDOMEN PERITONEUM & OMENTUM  1972       Current Outpatient Medications   Medication Sig    apixaban (Eliquis) 5 mg tablet Take 5 mg by mouth two (2) times a day. valsartan (DIOVAN) 40 mg tablet Take  by mouth daily. dapagliflozin (Farxiga) 10 mg tab tablet Take 1 Tablet by mouth daily. aspirin delayed-release 81 mg tablet Take 1 Tablet by mouth daily. carvediloL (COREG) 12.5 mg tablet TAKE 1 AND 1/2 TABLETS TWICE DAILY WITH MEALS    spironolactone (ALDACTONE) 25 mg tablet TAKE 1/2 TABLET EVERY DAY (Patient taking differently: 25 mg daily.)    bumetanide (BUMEX) 0.5 mg tablet TAKE 1 TABLET EVERY DAY (Patient taking differently: daily as needed.)    atorvastatin (LIPITOR) 20 mg tablet Take 2 Tabs by mouth daily. pembrolizumab (KEYTRUDA IV) by IntraVENous route. insulin glargine (LANTUS U-100 INSULIN) 100 unit/mL injection 20 Units by SubCUTAneous route daily. Indications: type 2 diabetes mellitus    OTHER     budesonide (PULMICORT) 0.25 mg/2 mL nbsp by Nebulization route. insulin lispro protamin/lispro (HUMALOG MIX 75-25 KWIKPEN SC) by SubCUTAneous route Before breakfast, lunch, and dinner. Pt reported sliding scale     No current facility-administered medications for this visit. Allergies and Sensitivities:  Allergies   Allergen Reactions    Lisinopril Cough       Family History:  No family history on file. Social History:  Social History     Tobacco Use    Smoking status: Former     Years: 24.00     Types: Cigarettes     Quit date: 1978     Years since quittin.5    Smokeless tobacco: Never   Substance Use Topics    Alcohol use: No    Drug use: No     She  reports that she quit smoking about 44 years ago. Her smoking use included cigarettes. She has never used smokeless tobacco.  She  reports no history of alcohol use. Review of Systems:  Cardiac symptoms as noted above in HPI. All others negative.     Physical Exam:  BP Readings from Last 3 Encounters:   23 (!) 148/62   10/04/22 138/84   22 (!) 162/90         Pulse Readings from Last 3 Encounters:   23 91   10/04/22 80   06/28/22 96          Wt Readings from Last 3 Encounters:   01/03/23 87.1 kg (192 lb)   10/04/22 90.6 kg (199 lb 12.8 oz)   09/28/22 87.1 kg (192 lb)       Constitutional: Oriented to person, place, and time. HENT: Head: Normocephalic and atraumatic. Neck: No JVD present. Cardiovascular: Regular rhythm. No murmur, gallop or rubs appreciated  Lung: Breath sounds normal. No respiratory distress. No ronchi or rales appreciated  Abdominal: No tenderness. No rebound and no guarding. Musculoskeletal: There is no lower extremity edema. No cynosis    Review of Data  LABS:   Lab Results   Component Value Date/Time    Sodium 140 05/16/2022 01:07 PM    Potassium 4.2 05/16/2022 01:07 PM    Chloride 106 05/16/2022 01:07 PM    CO2 29 05/16/2022 01:07 PM    Glucose 233 (H) 05/16/2022 01:07 PM    BUN 13 05/16/2022 01:07 PM    Creatinine 0.97 05/16/2022 01:07 PM     Lipids Latest Ref Rng & Units 12/19/2019 11/27/2019   Chol, Total <200 MG/ 206(H)   HDL 40 - 60 MG/DL 51 68(H)   LDL 0 - 100 MG/DL 54.4 109. 8(H)   Trig <150 MG/ 141   Chol/HDL Ratio 0 - 5.0   2.5 3.0   Some recent data might be hidden     Lab Results   Component Value Date/Time    ALT (SGPT) 15 05/16/2022 01:07 PM     Lab Results   Component Value Date/Time    Hemoglobin A1c 10.9 (H) 03/10/2020 03:50 AM       EKG    ECHO (12/19)  Left Ventricle Normal cavity size and wall thickness. Severe systolic dysfunction. The estimated ejection fraction is 26 - 30%. Visually measured ejection fraction. Global hypokinesis observed. There is severe (grade 3) left ventricular diastolic dysfunction. Elevated left ventricular diastolic pressure. Wall Scoring The left ventricular wall motion is globally hypokinetic. ECHO (09/20)  Left Ventricle  Wall motion: normal. The estimated EF is 45 - 50%. Visually measured ejection fraction. Mildly reduced systolic function. There is mild (grade 1) left ventricular diastolic dysfunction.     Wall Scoring The left ventricular wall motion is normal.               ECHO ADULT FOLLOW-UP OR LIMITED 09/28/2022 9/28/2022  Interpretation Summary    Left Ventricle: Severely reduced left ventricular systolic function with a visually estimated EF of 25 - 30%. Severe global hypokinesis present. Aortic Valve: Tricuspid valve. Mild sclerosis of the aortic valve cusp. Mild regurgitation. No stenosis. Mitral Valve: Mildly thickened leaflet. Mild to moderate regurgitation. No stenosis noted. Tricuspid Valve: Trace regurgitation. No stenosis noted. CARDIAC PROCEDURE 05/18/2022 5/18/2022  Conclusion  LM: Normal  LAD: Ostial proximal 30-40% smooth narrowing, minimal in-stent restenosis of mid LAD stent otherwise normal  [place]amus: Small-medium caliber, mid 60% tubular narrowing, for distal 95% tubular stenosis, unchanged  LCx/OM: Minimal luminal irregularities. Inferior branch of OM has 30-40% tubular narrowing  RCA: Small vessel, Mid vessel 30-40% luminal irregularities, small caliber RPL with mid vessel 80% stenosis  LVEDP 18 mmHg  LVEF approximately 40 to 45%    IMPRESSION & PLAN:  Ms. Bia Landrum 69 y.o. female with multiple medical problem    Cardiomyopathy:  Patient has been diagnosed with severe LV dysfunction with EF 25% in December 2019. Last echo with EF of 30-35% in March 2020. LVEF 45-50% in September 2020  LVEF 25-30% by echo in 04/2022  ECHO in 09/2022 with LVEF 25-30%    Currently patient is on Diovan, spironolactone, carvedilol and Bumex. No significant fluid overload on exam  Patient was on Entresto however because of she was in donut hole she was not able to afford Entresto. Now we will switch back to [facility] as she has entered a new geovanny year  I also discussed with patient using SGLT2 inhibitor for her diabetes and cardiomyopathy. Juan Daniel Zhao or Ileana discussed. Agreeable to Farxiga 10 mg daily. Side effect discussed  AICD procedure and complication associated with procedure discussed in detail.    Risks include but are not limited to acute and chronic pain, infection, bleeding, deep venous thrombosis with chronic swelling of extremity, pulmonary embolism, anesthesia reactions, pneumothorax, hemothorax, emergent open heart surgery, need for repeat surgery to replace/reposition leads, and death. There can be a prolonged hospitalization with brain damage and reduced or compromised long term mobility. By stating these are possible risks, this does not exclude the potential for additional risks not named here. She is leaning towards getting it done. We will proceed with AICD implantation for primary prevention. CAD  Patient had non-STEMI in December 2019 during hospitalization with elevated troponin. Nuclear stress test showed perfusion defect. Cardiac cath showed CAD and she had LAD stent in December 2019  Repeat cardiac catheterization in 06/2022 showed no significant obstructive coronary disease. Medical management was recommended. Continue aspirin lifelong. Patient is also on Eliquis because of recently diagnosed PE  Continue beta-blocker and statin    PE: CT chest in 12/22. Now on Apixaban. Defer to PCP    Hypertension: /68. Recommending to switching back from Diovan to [place]. Continue rest of the medication    Hyperlipidemia: Started on atorvastatin 20 mg daily. Continue same. Last LDL 54    Diabetes: Goal hemoglobin A1c less than 7 is recommended. Defer to PCP. Jeremiah Mcgowan because of diabetes and CHF    Lung cancer: Defer to primary team and heme-onc team.  Currently she is on [address]    This plan was discussed with patient who is in agreement. Thank you for allowing me to participate in patient care. Please feel free to call me if you have any question or concern. Myles Stewart MD  Please note: This document has been produced using voice recognition software. Unrecognized errors in transcription may be present.

## 2023-01-04 ENCOUNTER — DOCUMENTATION ONLY (OUTPATIENT)
Dept: CARDIOLOGY CLINIC | Age: 68
End: 2023-01-04

## 2023-01-04 NOTE — PROGRESS NOTES
Asked to evaluate for possible AICD by Dr. Jany Luciano. Chart reviewed. Diagnosis:  -Chronic class II systolic heart failure  -Ischemic cardiomyopathy with EF to 25% 2019 as well as September 2022  -History of CAD with LAD stent 2019 and follow-up echocardiogram summer 2022 without new disease, medically treated  -History of PE December 2022 on Eliquis  -History of COPD  -Hypertension  -Life expectancy greater than 1 year    Patient qualifies for dual-chamber AICD for primary prevention. Given recent PE we will tentatively schedule for February as long as patient agrees.

## 2023-01-09 ENCOUNTER — DOCUMENTATION ONLY (OUTPATIENT)
Dept: CARDIOLOGY CLINIC | Age: 68
End: 2023-01-09

## 2023-01-09 NOTE — PROGRESS NOTES
Procedure was approved!     Auth #: [id number]  CPT: 13098  DX: I25.5  Auth window: 02/10/2023 - 05/11/2023

## 2023-01-16 DIAGNOSIS — I25.83 CORONARY ARTERY DISEASE DUE TO LIPID RICH PLAQUE: ICD-10-CM

## 2023-01-16 DIAGNOSIS — Z01.818 PRE-OP TESTING: ICD-10-CM

## 2023-01-16 DIAGNOSIS — Z95.810 PRESENCE OF AUTOMATIC CARDIOVERTER/DEFIBRILLATOR (AICD): Primary | ICD-10-CM

## 2023-01-16 DIAGNOSIS — I25.10 CORONARY ARTERY DISEASE DUE TO LIPID RICH PLAQUE: ICD-10-CM

## 2023-01-16 DIAGNOSIS — I50.20 SYSTOLIC CONGESTIVE HEART FAILURE, UNSPECIFIED HF CHRONICITY (HCC): ICD-10-CM

## 2023-04-28 NOTE — TELEPHONE ENCOUNTER
PCP: Heri Holcomb MD    Last appt: 10/4/2022  Future Appointments   Date Time Provider Kateryna Melvin   1/3/2023  3:45 PM Bertin Larios MD [facility] BS AMB   4/6/2023  8:30 AM Bertin Larios MD [facility] BS AMB       Requested Prescriptions     Pending Prescriptions Disp Refills    valsartan (DIOVAN) 40 mg tablet 90 Tablet 3     Sig: Take 1 Tablet by mouth daily.
Spine appears normal, range of motion is not limited, no muscle or joint tenderness

## 2023-08-16 RX ORDER — SPIRONOLACTONE 25 MG/1
TABLET ORAL
Qty: 45 TABLET | OUTPATIENT
Start: 2023-08-16

## 2024-04-26 NOTE — Clinical Note
History and physical documented and up to date, allergies reviewed, lab results reviewed, pre-procedure education provided, patient verbalized understanding of procedure, procedural consent signed and patient is NPO. no

## 2024-09-05 NOTE — TELEPHONE ENCOUNTER
----- Message from Sara Zimmer MD sent at 4/18/2022 12:54 PM EDT -----  Please call the patient regarding her abnormal result. Global hypokinesis present. Severely reduced left ventricular systolic function with a visually estimated EF of 25 - 30%. Grade I diastolic dysfunction with normal LAP.
Attempted to contact pt at  number, no answer. Lvm for pt to return call to office at [phone]. Will continue to try to contact pt.
Contacted pt at [facility] number. Two patient Identifiers confirmed. Advised pt per Dr Villanueva Serum result note. Pt verbalized understanding and stated she had not started taking entresto yet but she will call the pharmacy and ask them to fill medication. Advised further review would be completed at [facility] No other issues noted.
Patient returning call regarding her echo
oral

## (undated) DEVICE — MEDI-VAC NON-CONDUCTIVE SUCTION TUBING: Brand: CARDINAL HEALTH

## (undated) DEVICE — KENDALL 500 SERIES DIAPHORETIC FOAM MONITORING ELECTRODE - TEAR DROP SHAPE ( 30/PK): Brand: KENDALL

## (undated) DEVICE — BAND RADIAL COMPR ARTERY 24CM -- REG BX/10

## (undated) DEVICE — SINGLE USE BIOPSY VALVE MAJ-210: Brand: SINGLE USE BIOPSY VALVE (STERILE)

## (undated) DEVICE — SOL IRRIGATION INJ NACL 0.9% 500ML BTL

## (undated) DEVICE — CATHETER ANGIO 5FR L100CM GRY S STL NYL JL3.5 3 SEG BRAID L

## (undated) DEVICE — 6X9 1.75 MIL 3-W LABGUARD TZ,DISPENS.BOX: Brand: MINIGRIP COMMERCIAL

## (undated) DEVICE — PRESSURE MONITORING SET: Brand: TRUWAVE

## (undated) DEVICE — ADAPTER TBNG DIA15MM SWVL FBROPT BRONCHSCP TERM 2 AXIS PEEP

## (undated) DEVICE — BITE BLK ENDOSCP AD 54FR GRN POLYETH ENDOSCP W STRP SLD

## (undated) DEVICE — CANNULA ORIG TL CLR W FOAM CUSHIONS AND 14FT SUPL TB 3 CHN

## (undated) DEVICE — (D)STOPCOCK IV 4W STD BOR -- DISC BY MFR NO SUB

## (undated) DEVICE — GLIDESHEATH SLENDER STAINLESS STEEL KIT: Brand: GLIDESHEATH SLENDER

## (undated) DEVICE — SET ADMIN 16ML TBNG L100IN 2 Y INJ SITE IV PIGGY BK DISP

## (undated) DEVICE — FLEX ADVANTAGE 1500CC: Brand: FLEX ADVANTAGE

## (undated) DEVICE — AIRLIFE™ MISTY MAX 10™ NEBULIZER WITH 7 FOOT (2.1 M) FEMALE U/CONNECT-IT CRUSH RESISTANT OXYGEN TUBING, BAFFLED TEE ADAPTER (22 MM I.D./ 22 MM O.D.), MOUTHPIECE AND 6 INCH (15 CM) FLEXTUBE: Brand: AIRLIFE™

## (undated) DEVICE — SYR 50ML SLIP TIP NSAF LF STRL --

## (undated) DEVICE — CATHETER GUID EXTRA BACKUP 3.5 0.070IN 6FR 100CM VISTA BRITE TIP

## (undated) DEVICE — SURGICAL PROCEDURE KIT LT HRT CUST

## (undated) DEVICE — SET ANGIO L6.5IN L BOR 3 W STPCOCK SPIK TBNG

## (undated) DEVICE — SINGLE USE SUCTION VALVE MAJ-209: Brand: SINGLE USE SUCTION VALVE (STERILE)

## (undated) DEVICE — DEVICE INFL W/ HEM VLV TORQ

## (undated) DEVICE — NC TREK CORONARY DILATATION CATHETER 2.75 MM X 20 MM / RAPID-EXCHANGE: Brand: NC TREK

## (undated) DEVICE — Device: Brand: DEFENDO VALVE AND CONNECTOR KIT

## (undated) DEVICE — CATHETER ANGIO 5FR L100CM GRY S STL NYL JR4 3 SEG BRAID L

## (undated) DEVICE — TRAP MUCUS SPECIMEN 40ML -- MEDICHOICE

## (undated) DEVICE — CATHETER ANGIO PIG 145 0.045 INX5 FRX110 CM THRULUMEN EXPO

## (undated) DEVICE — SYRINGE MED 10ML POLYPR LUERSLIP TIP FLAT TOP W/O SFTY DISP

## (undated) DEVICE — TREK CORONARY DILATATION CATHETER 2.50 MM X 15 MM / RAPID-EXCHANGE: Brand: TREK

## (undated) DEVICE — RADIFOCUS OPTITORQUE ANGIOGRAPHIC CATHETER: Brand: OPTITORQUE

## (undated) DEVICE — SYR ASSEMB INFL BLLN 60ML --

## (undated) DEVICE — ARNDT ENDOBRONCHIAL BLOCKER SET WITH SPHERICAL BALLOON: Brand: ARNDT

## (undated) DEVICE — KIT COLON W/ 1.1OZ LUB AND 2 END

## (undated) DEVICE — COPILOT BLEEDBACK CONTROL VALVE: Brand: COPILOT

## (undated) DEVICE — BLOCK BITE BLOX W DENTAL RIM --

## (undated) DEVICE — BASIN EMESIS 500CC ROSE 250/CS 60/PLT: Brand: MEDEGEN MEDICAL PRODUCTS, LLC

## (undated) DEVICE — CATHETER GUID XBLAD3 0.070 INX6 FRX100 CM VISTA BRT TIP

## (undated) DEVICE — GUIDEWIRE VASC L190CM DIA0.014IN ELASTINITE NIT HYDRPHLC

## (undated) DEVICE — BRUSH CYTO L150CM CHN L2MM BRIST DIA1.9MM PTFE S STL BULL

## (undated) DEVICE — GUIDEWIRE VASC L260CM DIA0.035IN RAD 3MM J TIP L7CM PTFE

## (undated) DEVICE — GUIDEWIRE VASC L260CM DIA0035IN TIP L3MM PTFE J STD TAPR FIX